# Patient Record
Sex: MALE | Race: WHITE | NOT HISPANIC OR LATINO | Employment: FULL TIME | URBAN - METROPOLITAN AREA
[De-identification: names, ages, dates, MRNs, and addresses within clinical notes are randomized per-mention and may not be internally consistent; named-entity substitution may affect disease eponyms.]

---

## 2023-10-29 PROBLEM — L73.2 HIDRADENITIS SUPPURATIVA: Status: ACTIVE | Noted: 2023-10-29

## 2023-10-29 PROBLEM — E11.9 DIABETES MELLITUS (MULTI): Status: ACTIVE | Noted: 2023-10-29

## 2023-10-29 PROBLEM — N40.1 BENIGN PROSTATIC HYPERPLASIA WITH LOWER URINARY TRACT SYMPTOMS: Status: ACTIVE | Noted: 2023-10-29

## 2023-10-29 PROBLEM — M70.72 BURSITIS OF LEFT HIP: Status: ACTIVE | Noted: 2023-10-29

## 2023-10-29 PROBLEM — I25.10 CAD (CORONARY ARTERY DISEASE), NATIVE CORONARY ARTERY: Status: ACTIVE | Noted: 2023-10-29

## 2023-10-29 PROBLEM — Z96.642 STATUS POST TOTAL REPLACEMENT OF LEFT HIP: Status: ACTIVE | Noted: 2023-10-29

## 2023-10-29 PROBLEM — R91.8 LUNG NODULES: Status: ACTIVE | Noted: 2023-10-29

## 2023-10-29 PROBLEM — N42.82 PROSTATITIS SYNDROME: Status: ACTIVE | Noted: 2023-10-29

## 2023-10-29 PROBLEM — G60.9 IDIOPATHIC PERIPHERAL NEUROPATHY: Status: ACTIVE | Noted: 2023-10-29

## 2023-10-29 PROBLEM — D72.829 LEUKOCYTOSIS: Status: ACTIVE | Noted: 2023-10-29

## 2023-10-29 PROBLEM — M16.12 OSTEOARTHRITIS OF LEFT HIP: Status: ACTIVE | Noted: 2023-10-29

## 2023-10-29 PROBLEM — E78.00 ELEVATED LDL CHOLESTEROL LEVEL: Status: ACTIVE | Noted: 2023-10-29

## 2023-10-29 PROBLEM — L02.92 FURUNCULOSIS: Status: ACTIVE | Noted: 2023-10-29

## 2023-10-29 PROBLEM — J45.991 COUGH VARIANT ASTHMA (HHS-HCC): Status: ACTIVE | Noted: 2023-10-29

## 2023-10-29 PROBLEM — I10 HYPERTENSION: Status: ACTIVE | Noted: 2023-10-29

## 2023-10-29 PROBLEM — J44.9 CHRONIC OBSTRUCTIVE PULMONARY DISEASE (MULTI): Status: ACTIVE | Noted: 2023-10-29

## 2023-10-29 PROBLEM — K80.70 CHOLELITHIASIS WITH CHOLEDOCHOLITHIASIS: Status: ACTIVE | Noted: 2023-10-29

## 2023-10-29 PROBLEM — L72.3 SEBACEOUS CYST: Status: ACTIVE | Noted: 2023-10-29

## 2023-10-29 PROBLEM — L73.9 FOLLICULITIS: Status: ACTIVE | Noted: 2023-10-29

## 2023-10-29 PROBLEM — N34.3 DYSURIA-FREQUENCY SYNDROME: Status: ACTIVE | Noted: 2023-10-29
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
## 2023-10-29 RX ORDER — DOXYCYCLINE 100 MG/1
1 TABLET ORAL EVERY 12 HOURS
COMMUNITY
Start: 2016-01-19 | End: 2024-01-02 | Stop reason: WASHOUT

## 2023-10-29 RX ORDER — FLUTICASONE PROPIONATE 50 MCG
1 SPRAY, SUSPENSION (ML) NASAL 2 TIMES DAILY
COMMUNITY
Start: 2020-04-07 | End: 2023-12-21 | Stop reason: ALTCHOICE

## 2023-10-29 RX ORDER — ROSUVASTATIN CALCIUM 5 MG/1
1 TABLET, COATED ORAL DAILY
COMMUNITY
Start: 2016-01-19

## 2023-10-29 RX ORDER — GABAPENTIN 100 MG/1
1 CAPSULE ORAL NIGHTLY PRN
COMMUNITY
Start: 2018-07-17

## 2023-10-29 RX ORDER — TRAMADOL HYDROCHLORIDE 50 MG/1
1 TABLET ORAL 3 TIMES DAILY
COMMUNITY
Start: 2015-10-20 | End: 2023-12-21 | Stop reason: ALTCHOICE

## 2023-10-29 RX ORDER — EPINEPHRINE 0.3 MG/.3ML
0.3 INJECTION SUBCUTANEOUS
COMMUNITY
Start: 2016-07-05 | End: 2024-03-14 | Stop reason: HOSPADM

## 2023-10-29 RX ORDER — MINERAL OIL
180 ENEMA (ML) RECTAL DAILY
COMMUNITY
End: 2023-12-21 | Stop reason: ALTCHOICE

## 2023-10-29 RX ORDER — MOMETASONE FUROATE 1 MG/G
OINTMENT TOPICAL
COMMUNITY
Start: 2022-03-10 | End: 2023-12-21 | Stop reason: ALTCHOICE

## 2023-10-29 RX ORDER — AMOXICILLIN 500 MG/1
TABLET, FILM COATED ORAL
COMMUNITY
Start: 2022-03-10 | End: 2023-12-21 | Stop reason: ALTCHOICE

## 2023-12-05 ENCOUNTER — LAB (OUTPATIENT)
Dept: LAB | Facility: LAB | Age: 63
End: 2023-12-05
Payer: COMMERCIAL

## 2023-12-05 DIAGNOSIS — J44.9 CHRONIC OBSTRUCTIVE PULMONARY DISEASE, UNSPECIFIED (MULTI): Primary | ICD-10-CM

## 2023-12-05 DIAGNOSIS — I10 ESSENTIAL (PRIMARY) HYPERTENSION: ICD-10-CM

## 2023-12-05 DIAGNOSIS — N40.1 BENIGN PROSTATIC HYPERPLASIA WITH LOWER URINARY TRACT SYMPTOMS: ICD-10-CM

## 2023-12-05 DIAGNOSIS — E78.00 PURE HYPERCHOLESTEROLEMIA, UNSPECIFIED: ICD-10-CM

## 2023-12-05 DIAGNOSIS — E11.9 TYPE 2 DIABETES MELLITUS WITHOUT COMPLICATIONS (MULTI): ICD-10-CM

## 2023-12-05 LAB
ALBUMIN SERPL BCP-MCNC: 4.5 G/DL (ref 3.4–5)
ALP SERPL-CCNC: 52 U/L (ref 33–136)
ALT SERPL W P-5'-P-CCNC: 12 U/L (ref 10–52)
ANION GAP SERPL CALC-SCNC: 13 MMOL/L (ref 10–20)
AST SERPL W P-5'-P-CCNC: 16 U/L (ref 9–39)
BASOPHILS # BLD AUTO: 0.1 X10*3/UL (ref 0–0.1)
BASOPHILS NFR BLD AUTO: 1 %
BILIRUB SERPL-MCNC: 0.7 MG/DL (ref 0–1.2)
BUN SERPL-MCNC: 13 MG/DL (ref 6–23)
CALCIUM SERPL-MCNC: 9.6 MG/DL (ref 8.6–10.3)
CHLORIDE SERPL-SCNC: 100 MMOL/L (ref 98–107)
CHOLEST SERPL-MCNC: 194 MG/DL (ref 0–199)
CHOLESTEROL/HDL RATIO: 5
CO2 SERPL-SCNC: 28 MMOL/L (ref 21–32)
CREAT SERPL-MCNC: 0.85 MG/DL (ref 0.5–1.3)
EOSINOPHIL # BLD AUTO: 0.45 X10*3/UL (ref 0–0.7)
EOSINOPHIL NFR BLD AUTO: 4.6 %
ERYTHROCYTE [DISTWIDTH] IN BLOOD BY AUTOMATED COUNT: 13.7 % (ref 11.5–14.5)
GFR SERPL CREATININE-BSD FRML MDRD: >90 ML/MIN/1.73M*2
GLUCOSE SERPL-MCNC: 75 MG/DL (ref 74–99)
HCT VFR BLD AUTO: 47.1 % (ref 41–52)
HDLC SERPL-MCNC: 39.1 MG/DL
HGB BLD-MCNC: 15.2 G/DL (ref 13.5–17.5)
IMM GRANULOCYTES # BLD AUTO: 0.05 X10*3/UL (ref 0–0.7)
IMM GRANULOCYTES NFR BLD AUTO: 0.5 % (ref 0–0.9)
LDLC SERPL CALC-MCNC: 126 MG/DL
LYMPHOCYTES # BLD AUTO: 3.29 X10*3/UL (ref 1.2–4.8)
LYMPHOCYTES NFR BLD AUTO: 33.4 %
MCH RBC QN AUTO: 30.5 PG (ref 26–34)
MCHC RBC AUTO-ENTMCNC: 32.3 G/DL (ref 32–36)
MCV RBC AUTO: 95 FL (ref 80–100)
MONOCYTES # BLD AUTO: 0.72 X10*3/UL (ref 0.1–1)
MONOCYTES NFR BLD AUTO: 7.3 %
NEUTROPHILS # BLD AUTO: 5.24 X10*3/UL (ref 1.2–7.7)
NEUTROPHILS NFR BLD AUTO: 53.2 %
NON HDL CHOLESTEROL: 155 MG/DL (ref 0–149)
NRBC BLD-RTO: 0 /100 WBCS (ref 0–0)
PLATELET # BLD AUTO: 381 X10*3/UL (ref 150–450)
POTASSIUM SERPL-SCNC: 4.5 MMOL/L (ref 3.5–5.3)
PROT SERPL-MCNC: 7 G/DL (ref 6.4–8.2)
RBC # BLD AUTO: 4.98 X10*6/UL (ref 4.5–5.9)
SODIUM SERPL-SCNC: 136 MMOL/L (ref 136–145)
T4 FREE SERPL-MCNC: 0.92 NG/DL (ref 0.61–1.12)
TRIGL SERPL-MCNC: 145 MG/DL (ref 0–149)
TSH SERPL-ACNC: 1.31 MIU/L (ref 0.44–3.98)
VLDL: 29 MG/DL (ref 0–40)
WBC # BLD AUTO: 9.9 X10*3/UL (ref 4.4–11.3)

## 2023-12-05 PROCEDURE — 85025 COMPLETE CBC W/AUTO DIFF WBC: CPT

## 2023-12-05 PROCEDURE — 84439 ASSAY OF FREE THYROXINE: CPT

## 2023-12-05 PROCEDURE — 83036 HEMOGLOBIN GLYCOSYLATED A1C: CPT

## 2023-12-05 PROCEDURE — 80053 COMPREHEN METABOLIC PANEL: CPT

## 2023-12-05 PROCEDURE — 80061 LIPID PANEL: CPT

## 2023-12-05 PROCEDURE — 84443 ASSAY THYROID STIM HORMONE: CPT

## 2023-12-05 PROCEDURE — 36415 COLL VENOUS BLD VENIPUNCTURE: CPT

## 2023-12-06 LAB
EST. AVERAGE GLUCOSE BLD GHB EST-MCNC: 108 MG/DL
HBA1C MFR BLD: 5.4 %

## 2023-12-07 ENCOUNTER — OFFICE VISIT (OUTPATIENT)
Dept: PRIMARY CARE | Facility: CLINIC | Age: 63
End: 2023-12-07
Payer: COMMERCIAL

## 2023-12-07 VITALS
SYSTOLIC BLOOD PRESSURE: 120 MMHG | DIASTOLIC BLOOD PRESSURE: 70 MMHG | HEART RATE: 72 BPM | RESPIRATION RATE: 20 BRPM | WEIGHT: 225 LBS | BODY MASS INDEX: 28.88 KG/M2 | HEIGHT: 74 IN

## 2023-12-07 DIAGNOSIS — I25.10 CORONARY ARTERY DISEASE INVOLVING NATIVE CORONARY ARTERY OF NATIVE HEART, UNSPECIFIED WHETHER ANGINA PRESENT: ICD-10-CM

## 2023-12-07 DIAGNOSIS — J40 BRONCHITIS: ICD-10-CM

## 2023-12-07 DIAGNOSIS — E78.00 ELEVATED LDL CHOLESTEROL LEVEL: ICD-10-CM

## 2023-12-07 DIAGNOSIS — J98.01 COUGH DUE TO BRONCHOSPASM: Primary | ICD-10-CM

## 2023-12-07 DIAGNOSIS — N40.1 BENIGN PROSTATIC HYPERPLASIA WITH URINARY FREQUENCY: ICD-10-CM

## 2023-12-07 DIAGNOSIS — J43.1 PANLOBULAR EMPHYSEMA (MULTI): ICD-10-CM

## 2023-12-07 DIAGNOSIS — E11.59 TYPE 2 DIABETES MELLITUS WITH OTHER CIRCULATORY COMPLICATION, WITHOUT LONG-TERM CURRENT USE OF INSULIN (MULTI): ICD-10-CM

## 2023-12-07 DIAGNOSIS — R35.0 BENIGN PROSTATIC HYPERPLASIA WITH URINARY FREQUENCY: ICD-10-CM

## 2023-12-07 PROCEDURE — 3078F DIAST BP <80 MM HG: CPT | Performed by: INTERNAL MEDICINE

## 2023-12-07 PROCEDURE — 3044F HG A1C LEVEL LT 7.0%: CPT | Performed by: INTERNAL MEDICINE

## 2023-12-07 PROCEDURE — 99214 OFFICE O/P EST MOD 30 MIN: CPT | Performed by: INTERNAL MEDICINE

## 2023-12-07 PROCEDURE — 1036F TOBACCO NON-USER: CPT | Performed by: INTERNAL MEDICINE

## 2023-12-07 PROCEDURE — 3074F SYST BP LT 130 MM HG: CPT | Performed by: INTERNAL MEDICINE

## 2023-12-07 PROCEDURE — 3049F LDL-C 100-129 MG/DL: CPT | Performed by: INTERNAL MEDICINE

## 2023-12-07 RX ORDER — AZITHROMYCIN 500 MG/1
500 TABLET, FILM COATED ORAL DAILY
Qty: 6 TABLET | Refills: 0 | Status: SHIPPED | OUTPATIENT
Start: 2023-12-07 | End: 2023-12-13

## 2023-12-07 RX ORDER — PREDNISONE 10 MG/1
TABLET ORAL
Qty: 30 TABLET | Refills: 0 | Status: SHIPPED | OUTPATIENT
Start: 2023-12-07 | End: 2023-12-17

## 2023-12-07 ASSESSMENT — ENCOUNTER SYMPTOMS
NEUROLOGICAL NEGATIVE: 1
EYES NEGATIVE: 1
ALLERGIC/IMMUNOLOGIC NEGATIVE: 1
PSYCHIATRIC NEGATIVE: 1
GASTROINTESTINAL NEGATIVE: 1
ENDOCRINE NEGATIVE: 1
CARDIOVASCULAR NEGATIVE: 1
HEMATOLOGIC/LYMPHATIC NEGATIVE: 1
CONSTITUTIONAL NEGATIVE: 1
MUSCULOSKELETAL NEGATIVE: 1
RESPIRATORY NEGATIVE: 1

## 2023-12-07 NOTE — ASSESSMENT & PLAN NOTE
Bronchitis with wheezing                                           Recommend:                                                                                                      mild  Pharyngitis,                                                                                                                                                                               Start: -  Azithromycin 500 mg daily for 6 days  -                                                                                                                                                             Asthma/COPD exacerbation       Allegra 180mg qD vs. Claritin 10 mg qD and Mucinex                                                                                                                                             Cough  - start Prednisone 10 mg II BID for 3 days and II qD for 3 days and I qD for 3 days -  Avoid cold exposure and take vitamins C 500 and B complex  qD   Hycodan syrup one tea spoon qHS 4 oz.     Sinusitis - allergic vs. infectious - start Allegra and Flonase I BID and Azithromycin 500 mg qD for 6 days. and avoid cold exposure.

## 2023-12-07 NOTE — ASSESSMENT & PLAN NOTE
Hypercholesterolemia - Monitor lipid profile and educate patient upon risks of high cholesterol and targets. Educate diet and change in lifestyle and increase in exercises - Refill:   Rosuvastatin and educate compliance with medication and diet.

## 2023-12-07 NOTE — ASSESSMENT & PLAN NOTE
COPD - Educate tobacco cessation extensively , no wheezing, no SOB, no Crackles heard/ Exacerbation.         Get CXR.  Continues mild exercises and inhalers.  [place] preventive care and vaccinations.                                       Add advair inhalers and spiriva inhaler.

## 2023-12-07 NOTE — PROGRESS NOTES
"Subjective   Patient ID: Mike Franco is a 63 y.o. male who presents for Follow-up (Follow up). Cough wheezing and sinus head aches and chest congestion and mild shortness of breath for 3 weeks  - Covid  tests were negative twice     HPI     Review of Systems   Constitutional: Negative.    HENT: Negative.     Eyes: Negative.    Respiratory: Negative.     Cardiovascular: Negative.    Gastrointestinal: Negative.    Endocrine: Negative.    Musculoskeletal: Negative.    Skin: Negative.    Allergic/Immunologic: Negative.    Neurological: Negative.    Hematological: Negative.    Psychiatric/Behavioral: Negative.     All other systems reviewed and are negative.      Objective   Ht 1.88 m (6' 2\")   Wt 102 kg (225 lb)   BMI 28.89 kg/m²   Blood pressure 120/70, pulse 72, resp. rate 20, height 1.88 m (6' 2\"), weight 102 kg (225 lb).   Physical Exam  Vitals and nursing note reviewed.   Constitutional:       Appearance: Normal appearance.   HENT:      Head: Normocephalic and atraumatic.      Right Ear: Tympanic membrane, ear canal and external ear normal.      Left Ear: Tympanic membrane, ear canal and external ear normal. There is no impacted cerumen.      Nose: Nose normal.      Mouth/Throat:      Mouth: Mucous membranes are moist.      Pharynx: Oropharynx is clear.   Eyes:      Extraocular Movements: Extraocular movements intact.      Conjunctiva/sclera: Conjunctivae normal.      Pupils: Pupils are equal, round, and reactive to light.   Cardiovascular:      Rate and Rhythm: Normal rate and regular rhythm.      Pulses: Normal pulses.      Heart sounds: Normal heart sounds. No murmur heard.  Pulmonary:      Effort: Pulmonary effort is normal. No respiratory distress.      Breath sounds: Normal breath sounds. No stridor. No wheezing, rhonchi or rales.   Chest:      Chest wall: No tenderness.   Abdominal:      General: Abdomen is flat. Bowel sounds are normal. There is no distension.      Palpations: Abdomen is soft. There is " no mass.      Tenderness: There is no abdominal tenderness. There is no right CVA tenderness, left CVA tenderness, guarding or rebound.      Hernia: No hernia is present.   Musculoskeletal:         General: Normal range of motion.      Cervical back: Normal range of motion and neck supple.   Skin:     General: Skin is warm.      Capillary Refill: Capillary refill takes less than 2 seconds.   Neurological:      General: No focal deficit present.      Mental Status: He is alert.      Cranial Nerves: No cranial nerve deficit.      Sensory: No sensory deficit.      Motor: No weakness.      Coordination: Coordination normal.      Gait: Gait normal.      Deep Tendon Reflexes: Reflexes normal.   Psychiatric:         Mood and Affect: Mood normal.         Behavior: Behavior normal. Behavior is cooperative.         Thought Content: Thought content normal.         Judgment: Judgment normal.         Assessment/Plan   Problem List Items Addressed This Visit             ICD-10-CM    Benign prostatic hyperplasia with lower urinary tract symptoms N40.1     BPH - Benign PROSTATIC Hypertrophy, + Dysuria/Nocturia, check PSA, prescribe Flomax 0.4mg qD and Avodart 0.5 mg qD vs. Finasteride 5 mg qD.  Educate exercises and Change in life style, prescribe vitamin E 400 IU qD. Consider referral to urology.          CAD (coronary artery disease), native coronary artery I25.10     CAD-coronary artery disease-patient has a history of myocardial infarction/stent placed in stenosed coronary arteries. Target LDL should be below 70 milligrams per deciliter. Reviewed EKG which shows no significant changes. Follows with his cardiologist/ Dr. Aviles. He needs to call us with any new symptoms of angina or shortness of breath. Last stress test was/last coronary catheterization was/. Need to address risk factors [employer] controlling cholesterol blood pressure and diabetes. Educate extensively diet. Patient needs to follow in rehabilitation/mild exercises  daily.          Chronic obstructive pulmonary disease (CMS/HCC) J44.9     COPD - Educate tobacco cessation extensively , no wheezing, no SOB, no Crackles heard/ Exacerbation.         Get CXR.  Continues mild exercises and inhalers.  [place] preventive care and vaccinations.                                       Add advair inhalers and spiriva inhaler.           Diabetes mellitus (CMS/HCC) E11.9     DM - NIDDM  . Reviewed with patient / Will check  HbA1c and fasting blood sugars. Educate home self monitoring and diary keeping(reviewed with patient home blood sugar levels /diary). Educate extensively low calorie diet and weight loss with exercise. Reviewed BS- diary and Rx. Regimen. [place] renal protection with ARB/ACEI.               Educate compliance with diet and Rx. And educate risks and autcomes.                                                 Elevated LDL cholesterol level E78.00     Hypercholesterolemia - Monitor lipid profile and educate patient upon risks of high cholesterol and targets. Educate diet and change in lifestyle and increase in exercises - Refill:   Rosuvastatin and educate compliance with medication and diet.           Cough due to bronchospasm - Primary J98.01     Bronchitis with wheezing                                           Recommend:                                                                                                      mild  Pharyngitis,                                                                                                                                                                               Start: -  Azithromycin 500 mg daily for 6 days  -                                                                                                                                                             Asthma/COPD exacerbation       Allegra 180mg qD vs. Claritin 10 mg qD and Mucinex                                                                                                                                              Cough  - start Prednisone 10 mg II BID for 3 days and II qD for 3 days and I qD for 3 days -  Avoid cold exposure and take vitamins C 500 and B complex  qD   Hycodan syrup one tea spoon qHS 4 oz.     Sinusitis - allergic vs. infectious - start Allegra and Flonase I BID and Azithromycin 500 mg qD for 6 days. and avoid cold exposure.           Relevant Medications    azithromycin (Zithromax) 500 mg tablet    predniSONE (Deltasone) 10 mg tablet    Bronchitis J40     Bronchitis with wheezing                                           Recommend:                                                                                                      mild  Pharyngitis,                                                                                                                                                                               Start: -  Azithromycin 500 mg daily for 6 days  -                                                                                                                                                             Asthma/COPD exacerbation       Allegra 180mg qD vs. Claritin 10 mg qD and Mucinex                                                                                                                                             Cough  - start Prednisone 10 mg II BID for 3 days and II qD for 3 days and I qD for 3 days -  Avoid cold exposure and take vitamins C 500 and B complex  qD   Hycodan syrup one tea spoon qHS 4 oz.     Sinusitis - allergic vs. infectious - start Allegra and Flonase I BID and Azithromycin 500 mg qD for 6 days. and avoid cold exposure.              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 Reviewed MRI of the left hip. Refer for THR..      Leg cramps - double up the Gabapentin - to 100 mg TID -      COPD - Exacerbation and wheezing Educate extensively tobacco cessation and educate risks of COPD/Emphysema and CAD and cancer and stroke associated with tobacco use and now there is no wheezing, no SOB, mild Crackles heard/ and decrease air exchange and breath sounds but no apparent Exacerbation. Reviewed with the patient the CT of the chest. Continues mild exercises and inhalers. [place] preventive care and vaccinations.      CAD _ asymptomatic - Reviewed with the patient the calcium score scan and EKG now - - with mild accumulation of plaque on LAD _ at 226 - Educate extensively tobacco cessation and risk of MI and check lipid profile and refill: the Rosuvastatin - address risk factors -      CHOL. - Hypercholesterolemia. Reviewed lipid profile (LDL at 208 mg/dL) and LFT's. Target LDL at < 100 mg/dl. Educate low cholesterol diet (low calorie and low fat). Educate weight loss and exercise. Educate weight self monitoring. Educate risks and outcomes. Not taking Crestor/ 10 mg daily. Restart Crestor 10 mg daily.      HTN - hypertension well/poorly controlled.Target BP < 130/80 well/not achieved. Educate low salt diet and exercise with weight loss. Represcribe ARB/ACEI , metoprolol, hctz/Maxzide,   Educate home self monitoring and diary keeping.      DM - NIDDM. Will check HbA1c and fasting blood sugars. Educate home self monitoring and diary keeping. Educate extensively low calorie diet and weight loss with exercise. Reviewed BS- diary and Rx. Regimen. [place] renal protection with ARB/ACEI. Educate compliance with diet and Rx. And educate risks and outcomes.   Neuropathy - positive numbness, tingling, discomfort (needles  pricking, cold feeling) in distal lower extremities(toes, feet, legs), secondary to DM/Radiculopathy/idiopathic. Recommend: Gabapentin(Neurontin) 300 daily(at bed time) upward taper up to 2 gm/day or Lyrica 75 mg daily if failure of treatment. Also recommend: treatment of Diabetes(control levels of blood sugars), lumbar/ cervical disc disease (Physical Therapy), and check electrolytes and Thyroid function. Also address regenerative measures: B12 IntraMuscular (Monthly) and Folic acid supplementation. Recommend EMG.      Preventive measures â€“ Recommend ASAP : Colonoscopy (educate patient risks of colon cancer) refer patient to GI specialist. Ophthalmology and retina exam recommend yearly exams refer patient to an Ophthalmologist. BPH â€“ (Benign Prostatic Hypertrophy) refer patient to an Urologist for rectal exam and PSA check. Educate extensively tobacco cessation and educate risks of COPD/Emphysema and CAD and cancer and stroke associated with tobacco use

## 2023-12-14 ENCOUNTER — APPOINTMENT (OUTPATIENT)
Dept: RADIOLOGY | Facility: HOSPITAL | Age: 63
End: 2023-12-14
Payer: COMMERCIAL

## 2023-12-14 ENCOUNTER — HOSPITAL ENCOUNTER (OUTPATIENT)
Dept: RADIOLOGY | Facility: HOSPITAL | Age: 63
Discharge: HOME | End: 2023-12-14
Payer: COMMERCIAL

## 2023-12-14 DIAGNOSIS — F17.201 NICOTINE DEPENDENCE, UNSPECIFIED, IN REMISSION: ICD-10-CM

## 2023-12-14 PROCEDURE — 71271 CT THORAX LUNG CANCER SCR C-: CPT

## 2023-12-14 PROCEDURE — 71271 CT THORAX LUNG CANCER SCR C-: CPT | Performed by: RADIOLOGY

## 2023-12-21 ENCOUNTER — OFFICE VISIT (OUTPATIENT)
Dept: PRIMARY CARE | Facility: CLINIC | Age: 63
End: 2023-12-21
Payer: COMMERCIAL

## 2023-12-21 VITALS
RESPIRATION RATE: 16 BRPM | HEART RATE: 88 BPM | BODY MASS INDEX: 29.13 KG/M2 | WEIGHT: 227 LBS | OXYGEN SATURATION: 97 % | HEIGHT: 74 IN | DIASTOLIC BLOOD PRESSURE: 70 MMHG | SYSTOLIC BLOOD PRESSURE: 110 MMHG

## 2023-12-21 DIAGNOSIS — I10 PRIMARY HYPERTENSION: ICD-10-CM

## 2023-12-21 DIAGNOSIS — F41.8 ANXIETY ASSOCIATED WITH DEPRESSION: ICD-10-CM

## 2023-12-21 DIAGNOSIS — R91.8 MASS OF LEFT LUNG: Primary | ICD-10-CM

## 2023-12-21 DIAGNOSIS — E11.59 TYPE 2 DIABETES MELLITUS WITH OTHER CIRCULATORY COMPLICATION, WITHOUT LONG-TERM CURRENT USE OF INSULIN (MULTI): ICD-10-CM

## 2023-12-21 PROCEDURE — 3049F LDL-C 100-129 MG/DL: CPT | Performed by: INTERNAL MEDICINE

## 2023-12-21 PROCEDURE — 3074F SYST BP LT 130 MM HG: CPT | Performed by: INTERNAL MEDICINE

## 2023-12-21 PROCEDURE — 3044F HG A1C LEVEL LT 7.0%: CPT | Performed by: INTERNAL MEDICINE

## 2023-12-21 PROCEDURE — 99213 OFFICE O/P EST LOW 20 MIN: CPT | Performed by: INTERNAL MEDICINE

## 2023-12-21 PROCEDURE — 1036F TOBACCO NON-USER: CPT | Performed by: INTERNAL MEDICINE

## 2023-12-21 PROCEDURE — 3078F DIAST BP <80 MM HG: CPT | Performed by: INTERNAL MEDICINE

## 2023-12-21 RX ORDER — ALPRAZOLAM 0.25 MG/1
0.25 TABLET ORAL 3 TIMES DAILY PRN
Qty: 30 TABLET | Refills: 0 | Status: SHIPPED | OUTPATIENT
Start: 2023-12-21 | End: 2024-01-02 | Stop reason: WASHOUT

## 2023-12-21 ASSESSMENT — ENCOUNTER SYMPTOMS
MUSCULOSKELETAL NEGATIVE: 1
CONSTITUTIONAL NEGATIVE: 1
NEUROLOGICAL NEGATIVE: 1
CARDIOVASCULAR NEGATIVE: 1
RESPIRATORY NEGATIVE: 1
EYES NEGATIVE: 1
GASTROINTESTINAL NEGATIVE: 1
HEMATOLOGIC/LYMPHATIC NEGATIVE: 1
ENDOCRINE NEGATIVE: 1
ALLERGIC/IMMUNOLOGIC NEGATIVE: 1
PSYCHIATRIC NEGATIVE: 1

## 2023-12-21 NOTE — ASSESSMENT & PLAN NOTE
Left upper lobe mass on the CT of the chest causing cough and chest discomfort refer to PET scan and oncology for treatment and biopsy and .educate Tobacco cessation - Educate risks of smoking (CAD/COPD/CANCER of the lung or urinary bladder/CVA). Educate life style changes and prescribe nicotine patch and Wellbutrin 150mg BID. Educate stress reduction.

## 2023-12-21 NOTE — PROGRESS NOTES
"Subjective   Patient ID: Mike Franco is a 63 y.o. male who presents for Follow-up (Follow up for CT Results).    HPI     Review of Systems   Constitutional: Negative.    HENT: Negative.     Eyes: Negative.    Respiratory: Negative.     Cardiovascular: Negative.    Gastrointestinal: Negative.    Endocrine: Negative.    Musculoskeletal: Negative.    Skin: Negative.    Allergic/Immunologic: Negative.    Neurological: Negative.    Hematological: Negative.    Psychiatric/Behavioral: Negative.     All other systems reviewed and are negative.      Objective   Pulse 88   Ht 1.88 m (6' 2\")   Wt 103 kg (227 lb)   SpO2 97%   BMI 29.15 kg/m²   Blood pressure 110/70, pulse 88, resp. rate 16, height 1.88 m (6' 2\"), weight 103 kg (227 lb), SpO2 97 %.   Physical Exam  Vitals and nursing note reviewed.   Constitutional:       Appearance: Normal appearance.   HENT:      Head: Normocephalic and atraumatic.      Right Ear: Tympanic membrane, ear canal and external ear normal.      Left Ear: Tympanic membrane, ear canal and external ear normal. There is no impacted cerumen.      Nose: Nose normal.      Mouth/Throat:      Mouth: Mucous membranes are moist.      Pharynx: Oropharynx is clear.   Eyes:      Extraocular Movements: Extraocular movements intact.      Conjunctiva/sclera: Conjunctivae normal.      Pupils: Pupils are equal, round, and reactive to light.   Cardiovascular:      Rate and Rhythm: Normal rate and regular rhythm.      Pulses: Normal pulses.      Heart sounds: Normal heart sounds. No murmur heard.  Pulmonary:      Effort: Pulmonary effort is normal. No respiratory distress.      Breath sounds: Normal breath sounds. No stridor. No wheezing, rhonchi or rales.   Chest:      Chest wall: No tenderness.   Abdominal:      General: Abdomen is flat. Bowel sounds are normal. There is no distension.      Palpations: Abdomen is soft. There is no mass.      Tenderness: There is no abdominal tenderness. There is no right CVA " tenderness, left CVA tenderness, guarding or rebound.      Hernia: No hernia is present.   Musculoskeletal:         General: Normal range of motion.      Cervical back: Normal range of motion and neck supple.   Skin:     General: Skin is warm.      Capillary Refill: Capillary refill takes less than 2 seconds.   Neurological:      General: No focal deficit present.      Mental Status: He is alert.      Cranial Nerves: No cranial nerve deficit.      Sensory: No sensory deficit.      Motor: No weakness.      Coordination: Coordination normal.      Gait: Gait normal.      Deep Tendon Reflexes: Reflexes normal.   Psychiatric:         Mood and Affect: Mood normal.         Behavior: Behavior normal. Behavior is cooperative.         Thought Content: Thought content normal.         Judgment: Judgment normal.         Assessment/Plan   Problem List Items Addressed This Visit             ICD-10-CM    Diabetes mellitus (CMS/[facility]) E11.9     DM - NIDDM  . Reviewed with patient / Will check  HbA1c and fasting blood sugars. Educate home self monitoring and diary keeping(reviewed with patient home blood sugar levels /diary). Educate extensively low calorie diet and weight loss with exercise. Reviewed BS- diary and Rx. Regimen. [place] renal protection with ARB/ACEI.               Educate compliance with diet and Rx. And educate risks and autcomes.                                                 Hypertension I10     HTN - hypertension well/controlled .Target BP < 130/80  achieved. Educate low salt diet and exercise with weight loss. Educate home self monitoring and diary keeping. Educate risks of elevate blood pressure and benefits of prompt treatment.  Refill          Mass of left lung - Primary R91.8     Left upper lobe mass on the CT of the chest causing cough and chest discomfort refer to PET scan and oncology for treatment and biopsy and .educate Tobacco cessation - Educate risks of smoking (CAD/COPD/CANCER of the lung or  urinary bladder/CVA). Educate life style changes and prescribe nicotine patch and Wellbutrin 150mg BID. Educate stress reduction.                                                                                      Relevant Orders    Referral to Hematology and Oncology    NM PET CT lung SPN       Benign prostatic hyperplasia with lower urinary tract symptoms N40.1        BPH - Benign PROSTATIC Hypertrophy, + Dysuria/Nocturia, check PSA, prescribe Flomax 0.4mg qD and Avodart 0.5 mg qD vs. Finasteride 5 mg qD.  Educate exercises and Change in life style, prescribe vitamin E 400 IU qD. Consider referral to urology.            CAD (coronary artery disease), native coronary artery I25.10       CAD-coronary artery disease-patient has a history of myocardial infarction/stent placed in stenosed coronary arteries. Target LDL should be below 70 milligrams per deciliter. Reviewed EKG which shows no significant changes. Follows with his cardiologist/ Dr. Aviles. He needs to call us with any new symptoms of angina or shortness of breath. Last stress test was/last coronary catheterization was/. Need to address risk factors [employer] controlling cholesterol blood pressure and diabetes. Educate extensively diet. Patient needs to follow in rehabilitation/mild exercises daily.            Chronic obstructive pulmonary disease (CMS/[facility]) J44.9       COPD - Educate tobacco cessation extensively , no wheezing, no SOB, no Crackles heard/ Exacerbation.         Get CXR.  Continues mild exercises and inhalers.  [place] preventive care and vaccinations.                                       Add advair inhalers and spiriva inhaler.             Diabetes mellitus (CMS/[facility]) E11.9       DM - NIDDM  . Reviewed with patient / Will check  HbA1c and fasting blood sugars. Educate home self monitoring and diary keeping(reviewed with patient home blood sugar levels /diary). Educate extensively low calorie diet and weight loss with exercise. Reviewed BS-  diary and Rx. Regimen. [place] renal protection with ARB/ACEI.               Educate compliance with diet and Rx. And educate risks and autcomes.                                                   Elevated LDL cholesterol level E78.00       Hypercholesterolemia - Monitor lipid profile and educate patient upon risks of high cholesterol and targets. Educate diet and change in lifestyle and increase in exercises - Refill:   Rosuvastatin and educate compliance with medication and diet.             Cough due to bronchospasm - Primary J98.01       Bronchitis with wheezing                                           Recommend:                                                                                                      mild  Pharyngitis,                                                                                                                                                                               Start: -  Azithromycin 500 mg daily for 6 days  -                                                                                                                                                             Asthma/COPD exacerbation       Allegra 180mg qD vs. Claritin 10 mg qD and Mucinex                                                                                                                                              Cough  - start Prednisone 10 mg II BID for 3 days and II qD for 3 days and I qD for 3 days -  Avoid cold exposure and take vitamins C 500 and B complex  qD   Hycodan syrup one tea spoon qHS 4 oz.      Sinusitis - allergic vs. infectious - start Allegra and Flonase I BID and Azithromycin 500 mg qD for 6 days. and avoid cold exposure.             Relevant Medications     azithromycin (Zithromax) 500 mg tablet     predniSONE (Deltasone) 10 mg tablet     Bronchitis J40       Bronchitis with wheezing                                           Recommend:                                                                                                       mild  Pharyngitis,                                                                                                                                                                               Start: -  Azithromycin 500 mg daily for 6 days  -                                                                                                                                                             Asthma/COPD exacerbation       Allegra 180mg qD vs. Claritin 10 mg qD and Mucinex                                                                                                                                              Cough  - start Prednisone 10 mg II BID for 3 days and II qD for 3 days and I qD for 3 days -  Avoid cold exposure and take vitamins C 500 and B complex  qD   Hycodan syrup one tea spoon qHS 4 oz.      Sinusitis - allergic vs. infectious - start Allegra and Flonase I BID and Azithromycin 500 mg qD for 6 days. and avoid cold exposure.                 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 Reviewed MRI of the left hip. Refer for THR..      Leg cramps - double up the Gabapentin - to 100 mg TID -      COPD - Exacerbation and wheezing Educate extensively tobacco cessation and educate risks of COPD/Emphysema and CAD and cancer and stroke associated with tobacco use and now there is no wheezing, no SOB, mild Crackles heard/ and decrease air exchange and breath sounds but no apparent Exacerbation. Reviewed with the patient the CT of the chest.  Continues mild exercises and inhalers. [place] preventive care and vaccinations.      CAD _ asymptomatic - Reviewed with the patient the calcium score scan and EKG now - - with mild accumulation of plaque on LAD _ at 226 - Educate extensively tobacco cessation and risk of MI and check lipid profile and refill: the Rosuvastatin - address risk factors -      CHOL. - Hypercholesterolemia. Reviewed lipid profile (LDL at 208 mg/dL) and LFT's. Target LDL at < 100 mg/dl. Educate low cholesterol diet (low calorie and low fat). Educate weight loss and exercise. Educate weight self monitoring. Educate risks and outcomes. Not taking Crestor/ 10 mg daily. Restart Crestor 10 mg daily.      HTN - hypertension well/poorly controlled.Target BP < 130/80 well/not achieved. Educate low salt diet and exercise with weight loss. Represcribe ARB/ACEI , metoprolol, hctz/Maxzide,   Educate home self monitoring and diary keeping.      DM - NIDDM. Will check HbA1c and fasting blood sugars. Educate home self monitoring and diary keeping. Educate extensively low calorie diet and weight loss with exercise. Reviewed BS- diary and Rx. Regimen. [place] renal protection with ARB/ACEI. Educate compliance with diet and Rx. And educate risks and outcomes.   Neuropathy - positive numbness, tingling, discomfort (needles pricking, cold feeling) in distal lower extremities(toes, feet, legs), secondary to DM/Radiculopathy/idiopathic. Recommend: Gabapentin(Neurontin) 300 daily(at bed time) upward taper up to 2 gm/day or Lyrica 75 mg daily if failure of treatment. Also recommend: treatment of Diabetes(control levels of blood sugars), lumbar/ cervical disc disease (Physical Therapy), and check electrolytes and Thyroid function. Also address regenerative measures: B12 IntraMuscular (Monthly) and Folic acid supplementation. Recommend EMG.      Preventive measures â€“ Recommend ASAP : Colonoscopy (educate patient risks of colon cancer) refer patient to GI  specialist. Ophthalmology and retina exam recommend yearly exams refer patient to an Ophthalmologist. BPH â€“ (Benign Prostatic Hypertrophy) refer patient to an Urologist for rectal exam and PSA check. Educate extensively tobacco cessation and educate risks of COPD/Emphysema and CAD and cancer and stroke associated with tobacco use                Immunizations/Injections     Flu vaccine (IIV4), preservative free *Check age/dose*10/23/2020  Influenza, [id number]/18/2016, 10/8/2013  Influenza, seasonal, [id number]/14/2017, 10/16/2014  Influenza, seasonal, injectable, preservative free10/20/2015  Moderna SARS-CoV-2 Vaccination3/21/2022, 5/22/2021, 4/24/2021

## 2023-12-21 NOTE — ASSESSMENT & PLAN NOTE
DM - NIDDM  . Reviewed with patient / Will check  HbA1c and fasting blood sugars. Educate home self monitoring and diary keeping(reviewed with patient home blood sugar levels /diary). Educate extensively low calorie diet and weight loss with exercise. Reviewed BS- diary and Rx. Regimen. [place] renal protection with ARB/ACEI.               Educate compliance with diet and Rx. And educate risks and autcomes.

## 2023-12-21 NOTE — ASSESSMENT & PLAN NOTE
Anxiety Disorder - +/- Depresion.  the patient extensively. Prescribe  Xanax  0.25mg q8hr prn # 30 and 2 rfls.

## 2023-12-21 NOTE — ASSESSMENT & PLAN NOTE
HTN - hypertension well/controlled .Target BP < 130/80  achieved. Educate low salt diet and exercise with weight loss. Educate home self monitoring and diary keeping. Educate risks of elevate blood pressure and benefits of prompt treatment.  Refill

## 2023-12-22 DIAGNOSIS — R91.8 MASS OF LEFT LUNG: ICD-10-CM

## 2023-12-31 NOTE — PROGRESS NOTES
Patient: Mike Franco    [id number]  : 1960 -- AGE 63 y.o.    Provider: Sylvia PITT [facility]     Location [facility]   Service Date: 24          [facility]  New Visit Note    Virtual or Telephone Consent  A telephone visit (audio only) between the patient (at the originating site) and the provider (at the distant site) was utilized to provide this telehealth service.   Verbal consent was requested and obtained from Mike Franco on this date, 24 for a telehealth visit.     HISTORY OF PRESENT ILLNESS     The patient's referring provider is: No ref. provider found    HISTORY OF PRESENT ILLNESS   Mike Franco is a 63 y.o. male who is a former smoker (21 pack years), who presents to a [facility] for an initial evaluation for pre-bronchoscopy visit for a left lung mass    I have independently interviewed and examined the patient in the office and reviewed available records.    Current History    On today's visit, the patient reports he has a productive cough with clear mucus. He states his voice has been hoarse. He wheezes when he lays down. He has shortness of breath when he goes outside and has a little pain on his left chest area. He reports that his PCP prescribed antibiotic and steroids in December for a chest cold.      Previous pulmonary history: He has no history of recurrent infections, or lung disease as a child.  He had no previous lung hx, never on oxygen or inhaler therapy.     Inhalers/nebulized medications: no    Hospitalization History: He has not been hospitalized over the last year for breathing related problem.    Sleep history:  Denies snoring, apnea, feeling tired during the day or taking naps during the day.         ALLERGIES AND MEDICATIONS     ALLERGIES  No Known Allergies    MEDICATIONS  Current Outpatient Medications   Medication Sig Dispense Refill    ALPRAZolam (Xanax) 0.25 mg tablet Take  1 tablet (0.25 mg) by mouth 3 times a day as needed for anxiety. 30 tablet 0    dextromethorphan-guaifenesin (Mucinex DM)  mg 12 hr tablet Take 1 tablet by mouth every 12 hours. Do not crush, chew, or split.      doxycycline (Adoxa) 100 mg tablet Take 1 tablet (100 mg) by mouth every 12 hours.      EPINEPHrine 0.3 mg/0.3 mL injection syringe Inject 0.3 mL (0.3 mg) into the muscle. As Directed      gabapentin (Neurontin) 100 mg capsule Take 1 capsule (100 mg) by mouth once daily at bedtime.      rosuvastatin (Crestor) 5 mg tablet Take 1 tablet (5 mg) by mouth once daily.       No current facility-administered medications for this visit.         PAST HISTORY     PAST MEDICAL HISTORY      PAST SURGICAL HISTORY  Past Surgical History:   Procedure Laterality Date    HERNIA REPAIR      LEG AMPUTATION Right        IMMUNIZATION HISTORY  Immunization History   Administered Date(s) Administered    Flu vaccine (IIV4), preservative free *Check age/dose* 10/23/2020    Influenza, Unspecified 10/08/2013, 10/18/2016    Influenza, seasonal, injectable 10/16/2014, 2017    Influenza, seasonal, injectable, preservative free 10/20/2015    Moderna SARS-CoV-2 Vaccination 2021, 2021, 2022       SOCIAL HISTORY  Tobacco Smokin- 62 - 1/2 pack/day ~ 21 pack years  Illicit drugs: none  Alcohol consumption: occasionally  Pets: cat    OCCUPATIONAL/ENVIRONMENTAL HISTORY  Occupation:   Unknown  fiberglass. Unknown exposure to asbestos, silica, beryllium or inhaled metals.  No exposure to birds or exotic animals.    FAMILY HISTORY  Family History   Problem Relation Name Age of Onset    Cancer Mother       No family history of pulmonary disease.  Mother - cancer unknown.   Un family history of autoimmune disorders.    REVIEW OF SYSTEMS     REVIEW OF SYSTEMS  Review of Systems    Constitutional: No fever, no chills, no night sweats.    Eyes: No double vision, no floaters, no dry eyes.   ENT: See HPI.   Neck: No neck  stiffness.  Cardiovascular: No sharp chest pain, no heart racing, no leg swelling.  Respiratory: as noted in HPI.   Gastrointestinal: No nausea, no vomiting, no diarrhea.   Musculoskeletal: No joint pain, no back pain.   Integumentary: No rashes or sores.  Neurological: No dizziness, no headaches. Sleeping well.  Psychiatric: No mood changes.       PHYSICAL EXAM     VITAL SIGNS: There were no vitals taken for this visit.     CURRENT WEIGHT: There is no height or weight on file to calculate BMI.  PREVIOUS WEIGHTS:  Wt Readings from Last 3 Encounters:   12/21/23 103 kg (227 lb)   12/07/23 102 kg (225 lb)   06/06/23 102 kg (225 lb)       Physical Exam    Telephone visit    RESULTS/DATA     Pulmonary Function Test Results         None on record    Chest Radiograph     XR CHEST 1 VIEW 04/04/2020    Narrative  MRN: [id number]  Patient Name: GERARDO FONSECA    STUDY:  CHEST 1 VIEW    INDICATION:  Chest Pain.    COMPARISON:  February 12, 2020    ACCESSION NUMBER(S):  [id number]    ORDERING CLINICIAN:  BRANDY GOETZ    FINDINGS:  No consolidation, effusion, edema, or pneumothorax. Heart size within  normal limits.    Impression  No evidence of acute intrathoracic abnormality.      Chest CT Scan     CT lung screening low dose 12/14/2023    Narrative  Interpreted By:  Harley Ferreira,  STUDY:  CT LUNG SCREENING LOW DOSE;  12/14/2023 4:14 pm    INDICATION:  Signs/Symptoms: Nicotine dependence in remission  F17.201: Nicotine  dependence in remission.    COMPARISON:  11/30/2021    ACCESSION NUMBER(S):  [id number]    ORDERING CLINICIAN:  MATEO QUEZADA    TECHNIQUE:  Helical data acquisition of the chest was obtained without IV  contrast material.  Images were reformatted in axial, coronal, and  sagittal planes.    FINDINGS:  LUNGS AND AIRWAYS:  There is narrowing/occlusion of the left upper lobe bronchus.  Endobronchial invasion is suspected..    There is severe centrilobular and paraseptal emphysematous  changes.There has been  interval development of multifocal left upper  lobe masslike opacities. This infiltrative upper lobe soft tissue  measures 8.2 x 1.1 cm in dimension.        MEDIASTINUM AND VAL, LOWER NECK AND AXILLA:  The visualized thyroid gland is within normal limits.  There is extensive infiltrative mediastinal lymphadenopathy. This  infiltrative lymphadenopathy results in narrowing/occlusion of the  left upper lobe bronchus. This is most severe within the anterior  mediastinum with extension within the AP window paratracheal and  subcarinal mediastinal soft tissue Esophagus appears within normal  limits as seen.    HEART AND VESSELS:  There is extensive atherosclerotic calcification within the thoracic  aorta. Main pulmonary artery and its branches are normal in caliber.  Moderate coronary artery calcifications.  The cardiac chambers are not enlarged.  There is no pericardial effusion seen.    UPPER ABDOMEN:  There is cholelithiasis.  Extensive atherosclerotic calcification of the abdominal aorta.        CHEST WALL AND OSSEOUS STRUCTURES:  Chest wall is within normal limits.  No acute osseous pathology.There are no suspicious osseous lesions.    Impression  1. Extensive infiltrative left upper lobe masslike opacity which  results in endobronchial narrowing/occlusion of the left upper lobe  bronchus, concerning for bronchogenic neoplasm.  2. There is extensive infiltrative mediastinal lymphadenopathy most  likely neoplastic  3. Recommend PET-CT for evaluation and bronchoscopic sampling  evaluation of the left upper lobe bronchus.      LUNG RADS CATEGORY:  Lung Rad: Lung-RADS 4X (Very Suspicious)    Recommendation: Further evaluation with diagnostic chest CT with or  without contrast, may consider PET/CT if solid component is ? 8mm  (268 mm3) or tissue sampling depending on probability of malignancy,  recommended as per American College of Radiology Guidelines Lung-RADS  Version 2022. Management depends on clinical evaluation,  "patient  preference, and the probability of malignancy  (https://brocku.ca/lung-cancer-screening-and-risk-prediction/risk-calc  ulators/) Recommend F/U with Thoracic Surgeon.      MACRO:  Critical Finding:  See findings. Notification was initiated on  12/15/2023 at 6:55 am by  Harley Ferreira.  (**-YCF-**) Instructions:    Signed by: Harley Ferreira 12/15/2023 6:56 AM  Dictation workstation:   [id number]      Echocardiogram     No results found for this or any previous visit from the past 365 days.       Labwork   Complete Blood Count  Lab Results   Component Value Date    WBC 9.9 12/05/2023    HGB 15.2 12/05/2023    HCT 47.1 12/05/2023    MCV 95 12/05/2023     12/05/2023       Peripheral Eosinophil Count/Percentage:   Eosinophils Absolute (x10*3/uL)   Date Value   12/05/2023 0.45     Eosinophils % (%)   Date Value   12/05/2023 4.6       Serum Immunoglobulin E:    No results found for: \"IGE\"     ASSESSMENT/PLAN   Mr. Franco is a 63 y.o. male, who    Problem List and Orders      Assessment and Plan / Recommendations:  Problem List Items Addressed This Visit    None        Patient's visit was converted to a telephone visit given current COVID- 19 pandemic.     1. Left lung mass:   - plan for bronchoscopy with biopsy on 1/3/24  - labwork prior to procedure   - Not on any anticoagulation     I explained the procedure to the patient. We discussed that the bronchoscopy will be performed by a member of the Interventional Pulmonary Team (Dr Brown Guzman,  Dr. Breanne Samuels, or Dr. Tee Durham) depending on scheduling and provider availability. We also discussed that the IP providers function as a team and not infrequently may have to fill in for one another if there are emergent issues that need attention at the same time. The patient / family expressed understanding and agreed to proceed. All questions were answered.     Thank you for visiting the Pulmonary clinic today!   Sylvia Goldberg CNP  [phone] " [phone]    Follow up as needed.    If you have any questions please call the office [phone]    Thank you for visiting the Pulmonary clinic today!   Sylvia Goldberg CNP  [phone]

## 2023-12-31 NOTE — H&P (VIEW-ONLY)
Patient: Mike Franco    [id number]  : 1960 -- AGE 63 y.o.    Provider: Sylvia PITT [facility]     Location [facility]   Service Date: 24          [facility]  New Visit Note    Virtual or Telephone Consent  A telephone visit (audio only) between the patient (at the originating site) and the provider (at the distant site) was utilized to provide this telehealth service.   Verbal consent was requested and obtained from Mike Franco on this date, 24 for a telehealth visit.     HISTORY OF PRESENT ILLNESS     The patient's referring provider is: No ref. provider found    HISTORY OF PRESENT ILLNESS   Mike Franco is a 63 y.o. male who is a former smoker (21 pack years), who presents to a [facility] for an initial evaluation for pre-bronchoscopy visit for a left lung mass    I have independently interviewed and examined the patient in the office and reviewed available records.    Current History    On today's visit, the patient reports he has a productive cough with clear mucus. He states his voice has been hoarse. He wheezes when he lays down. He has shortness of breath when he goes outside and has a little pain on his left chest area. He reports that his PCP prescribed antibiotic and steroids in December for a chest cold.      Previous pulmonary history: He has no history of recurrent infections, or lung disease as a child.  He had no previous lung hx, never on oxygen or inhaler therapy.     Inhalers/nebulized medications: no    Hospitalization History: He has not been hospitalized over the last year for breathing related problem.    Sleep history:  Denies snoring, apnea, feeling tired during the day or taking naps during the day.         ALLERGIES AND MEDICATIONS     ALLERGIES  No Known Allergies    MEDICATIONS  Current Outpatient Medications   Medication Sig Dispense Refill    ALPRAZolam (Xanax) 0.25 mg tablet Take  1 tablet (0.25 mg) by mouth 3 times a day as needed for anxiety. 30 tablet 0    dextromethorphan-guaifenesin (Mucinex DM)  mg 12 hr tablet Take 1 tablet by mouth every 12 hours. Do not crush, chew, or split.      doxycycline (Adoxa) 100 mg tablet Take 1 tablet (100 mg) by mouth every 12 hours.      EPINEPHrine 0.3 mg/0.3 mL injection syringe Inject 0.3 mL (0.3 mg) into the muscle. As Directed      gabapentin (Neurontin) 100 mg capsule Take 1 capsule (100 mg) by mouth once daily at bedtime.      rosuvastatin (Crestor) 5 mg tablet Take 1 tablet (5 mg) by mouth once daily.       No current facility-administered medications for this visit.         PAST HISTORY     PAST MEDICAL HISTORY      PAST SURGICAL HISTORY  Past Surgical History:   Procedure Laterality Date    HERNIA REPAIR      LEG AMPUTATION Right        IMMUNIZATION HISTORY  Immunization History   Administered Date(s) Administered    Flu vaccine (IIV4), preservative free *Check age/dose* 10/23/2020    Influenza, Unspecified 10/08/2013, 10/18/2016    Influenza, seasonal, injectable 10/16/2014, 2017    Influenza, seasonal, injectable, preservative free 10/20/2015    Moderna SARS-CoV-2 Vaccination 2021, 2021, 2022       SOCIAL HISTORY  Tobacco Smokin- 62 - 1/2 pack/day ~ 21 pack years  Illicit drugs: none  Alcohol consumption: occasionally  Pets: cat    OCCUPATIONAL/ENVIRONMENTAL HISTORY  Occupation:   Unknown  fiberglass. Unknown exposure to asbestos, silica, beryllium or inhaled metals.  No exposure to birds or exotic animals.    FAMILY HISTORY  Family History   Problem Relation Name Age of Onset    Cancer Mother       No family history of pulmonary disease.  Mother - cancer unknown.   Un family history of autoimmune disorders.    REVIEW OF SYSTEMS     REVIEW OF SYSTEMS  Review of Systems    Constitutional: No fever, no chills, no night sweats.    Eyes: No double vision, no floaters, no dry eyes.   ENT: See HPI.   Neck: No neck  stiffness.  Cardiovascular: No sharp chest pain, no heart racing, no leg swelling.  Respiratory: as noted in HPI.   Gastrointestinal: No nausea, no vomiting, no diarrhea.   Musculoskeletal: No joint pain, no back pain.   Integumentary: No rashes or sores.  Neurological: No dizziness, no headaches. Sleeping well.  Psychiatric: No mood changes.       PHYSICAL EXAM     VITAL SIGNS: There were no vitals taken for this visit.     CURRENT WEIGHT: There is no height or weight on file to calculate BMI.  PREVIOUS WEIGHTS:  Wt Readings from Last 3 Encounters:   12/21/23 103 kg (227 lb)   12/07/23 102 kg (225 lb)   06/06/23 102 kg (225 lb)       Physical Exam    Telephone visit    RESULTS/DATA     Pulmonary Function Test Results         None on record    Chest Radiograph     XR CHEST 1 VIEW 04/04/2020    Narrative  MRN: [id number]  Patient Name: GERARDO FONSECA    STUDY:  CHEST 1 VIEW    INDICATION:  Chest Pain.    COMPARISON:  February 12, 2020    ACCESSION NUMBER(S):  [id number]    ORDERING CLINICIAN:  BRANDY GOETZ    FINDINGS:  No consolidation, effusion, edema, or pneumothorax. Heart size within  normal limits.    Impression  No evidence of acute intrathoracic abnormality.      Chest CT Scan     CT lung screening low dose 12/14/2023    Narrative  Interpreted By:  Harley Ferreira,  STUDY:  CT LUNG SCREENING LOW DOSE;  12/14/2023 4:14 pm    INDICATION:  Signs/Symptoms: Nicotine dependence in remission  F17.201: Nicotine  dependence in remission.    COMPARISON:  11/30/2021    ACCESSION NUMBER(S):  [id number]    ORDERING CLINICIAN:  MATEO QUEZADA    TECHNIQUE:  Helical data acquisition of the chest was obtained without IV  contrast material.  Images were reformatted in axial, coronal, and  sagittal planes.    FINDINGS:  LUNGS AND AIRWAYS:  There is narrowing/occlusion of the left upper lobe bronchus.  Endobronchial invasion is suspected..    There is severe centrilobular and paraseptal emphysematous  changes.There has been  interval development of multifocal left upper  lobe masslike opacities. This infiltrative upper lobe soft tissue  measures 8.2 x 1.1 cm in dimension.        MEDIASTINUM AND VAL, LOWER NECK AND AXILLA:  The visualized thyroid gland is within normal limits.  There is extensive infiltrative mediastinal lymphadenopathy. This  infiltrative lymphadenopathy results in narrowing/occlusion of the  left upper lobe bronchus. This is most severe within the anterior  mediastinum with extension within the AP window paratracheal and  subcarinal mediastinal soft tissue Esophagus appears within normal  limits as seen.    HEART AND VESSELS:  There is extensive atherosclerotic calcification within the thoracic  aorta. Main pulmonary artery and its branches are normal in caliber.  Moderate coronary artery calcifications.  The cardiac chambers are not enlarged.  There is no pericardial effusion seen.    UPPER ABDOMEN:  There is cholelithiasis.  Extensive atherosclerotic calcification of the abdominal aorta.        CHEST WALL AND OSSEOUS STRUCTURES:  Chest wall is within normal limits.  No acute osseous pathology.There are no suspicious osseous lesions.    Impression  1. Extensive infiltrative left upper lobe masslike opacity which  results in endobronchial narrowing/occlusion of the left upper lobe  bronchus, concerning for bronchogenic neoplasm.  2. There is extensive infiltrative mediastinal lymphadenopathy most  likely neoplastic  3. Recommend PET-CT for evaluation and bronchoscopic sampling  evaluation of the left upper lobe bronchus.      LUNG RADS CATEGORY:  Lung Rad: Lung-RADS 4X (Very Suspicious)    Recommendation: Further evaluation with diagnostic chest CT with or  without contrast, may consider PET/CT if solid component is ? 8mm  (268 mm3) or tissue sampling depending on probability of malignancy,  recommended as per American College of Radiology Guidelines Lung-RADS  Version 2022. Management depends on clinical evaluation,  "patient  preference, and the probability of malignancy  (https://brocku.ca/lung-cancer-screening-and-risk-prediction/risk-calc  ulators/) Recommend F/U with Thoracic Surgeon.      MACRO:  Critical Finding:  See findings. Notification was initiated on  12/15/2023 at 6:55 am by  Harley Ferreira.  (**-YCF-**) Instructions:    Signed by: Harley Ferreira 12/15/2023 6:56 AM  Dictation workstation:   [id number]      Echocardiogram     No results found for this or any previous visit from the past 365 days.       Labwork   Complete Blood Count  Lab Results   Component Value Date    WBC 9.9 12/05/2023    HGB 15.2 12/05/2023    HCT 47.1 12/05/2023    MCV 95 12/05/2023     12/05/2023       Peripheral Eosinophil Count/Percentage:   Eosinophils Absolute (x10*3/uL)   Date Value   12/05/2023 0.45     Eosinophils % (%)   Date Value   12/05/2023 4.6       Serum Immunoglobulin E:    No results found for: \"IGE\"     ASSESSMENT/PLAN   Mr. Franco is a 63 y.o. male, who    Problem List and Orders      Assessment and Plan / Recommendations:  Problem List Items Addressed This Visit    None        Patient's visit was converted to a telephone visit given current COVID- 19 pandemic.     1. Left lung mass:   - plan for bronchoscopy with biopsy on 1/3/24  - labwork prior to procedure   - Not on any anticoagulation     I explained the procedure to the patient. We discussed that the bronchoscopy will be performed by a member of the Interventional Pulmonary Team (Dr Brwon Guzman,  Dr. Breanne Samuels, or Dr. Tee Durham) depending on scheduling and provider availability. We also discussed that the IP providers function as a team and not infrequently may have to fill in for one another if there are emergent issues that need attention at the same time. The patient / family expressed understanding and agreed to proceed. All questions were answered.     Thank you for visiting the Pulmonary clinic today!   Sylvia Goldberg CNP  [phone] " [phone]    Follow up as needed.    If you have any questions please call the office [phone]    Thank you for visiting the Pulmonary clinic today!   Sylvia Goldberg CNP  [phone]

## 2024-01-02 ENCOUNTER — TELEMEDICINE (OUTPATIENT)
Dept: PULMONOLOGY | Facility: CLINIC | Age: 64
End: 2024-01-02
Payer: COMMERCIAL

## 2024-01-02 DIAGNOSIS — R91.8 LUNG MASS: Primary | ICD-10-CM

## 2024-01-02 PROCEDURE — 99204 OFFICE O/P NEW MOD 45 MIN: CPT

## 2024-01-03 ENCOUNTER — ANESTHESIA EVENT (OUTPATIENT)
Dept: GASTROENTEROLOGY | Facility: HOSPITAL | Age: 64
End: 2024-01-03
Payer: COMMERCIAL

## 2024-01-03 ENCOUNTER — ANESTHESIA (OUTPATIENT)
Dept: GASTROENTEROLOGY | Facility: HOSPITAL | Age: 64
End: 2024-01-03
Payer: COMMERCIAL

## 2024-01-03 ENCOUNTER — HOSPITAL ENCOUNTER (OUTPATIENT)
Dept: GASTROENTEROLOGY | Facility: HOSPITAL | Age: 64
Setting detail: OUTPATIENT SURGERY
Discharge: HOME | End: 2024-01-03
Payer: COMMERCIAL

## 2024-01-03 VITALS
WEIGHT: 225 LBS | HEART RATE: 80 BPM | DIASTOLIC BLOOD PRESSURE: 63 MMHG | OXYGEN SATURATION: 95 % | HEIGHT: 74 IN | BODY MASS INDEX: 28.88 KG/M2 | TEMPERATURE: 97.2 F | RESPIRATION RATE: 20 BRPM | SYSTOLIC BLOOD PRESSURE: 111 MMHG

## 2024-01-03 DIAGNOSIS — R91.8 LUNG MASS: ICD-10-CM

## 2024-01-03 PROCEDURE — 31652 BRONCH EBUS SAMPLNG 1/2 NODE: CPT | Performed by: INTERNAL MEDICINE

## 2024-01-03 PROCEDURE — 2500000004 HC RX 250 GENERAL PHARMACY W/ HCPCS (ALT 636 FOR OP/ED): Performed by: ANESTHESIOLOGIST ASSISTANT

## 2024-01-03 PROCEDURE — 7100000010 HC PHASE TWO TIME - EACH INCREMENTAL 1 MINUTE

## 2024-01-03 PROCEDURE — 3700000001 HC GENERAL ANESTHESIA TIME - INITIAL BASE CHARGE

## 2024-01-03 PROCEDURE — A31652 PR BRNCHSC EBUS GUIDED SAMPL 1/2 NODE STATION/STRUX: Performed by: ANESTHESIOLOGIST ASSISTANT

## 2024-01-03 PROCEDURE — A31652 PR BRNCHSC EBUS GUIDED SAMPL 1/2 NODE STATION/STRUX: Performed by: STUDENT IN AN ORGANIZED HEALTH CARE EDUCATION/TRAINING PROGRAM

## 2024-01-03 PROCEDURE — 88189 FLOWCYTOMETRY/READ 16 & >: CPT | Performed by: INTERNAL MEDICINE

## 2024-01-03 PROCEDURE — 2500000002 HC RX 250 W HCPCS SELF ADMINISTERED DRUGS (ALT 637 FOR MEDICARE OP, ALT 636 FOR OP/ED): Performed by: STUDENT IN AN ORGANIZED HEALTH CARE EDUCATION/TRAINING PROGRAM

## 2024-01-03 PROCEDURE — 7100000009 HC PHASE TWO TIME - INITIAL BASE CHARGE

## 2024-01-03 PROCEDURE — 3700000002 HC GENERAL ANESTHESIA TIME - EACH INCREMENTAL 1 MINUTE

## 2024-01-03 PROCEDURE — 88341 IMHCHEM/IMCYTCHM EA ADD ANTB: CPT | Performed by: PATHOLOGY

## 2024-01-03 PROCEDURE — 88185 FLOWCYTOMETRY/TC ADD-ON: CPT | Mod: TC,AHULAB | Performed by: INTERNAL MEDICINE

## 2024-01-03 PROCEDURE — 88342 IMHCHEM/IMCYTCHM 1ST ANTB: CPT | Mod: TC,SUR,AHULAB | Performed by: INTERNAL MEDICINE

## 2024-01-03 PROCEDURE — 88305 TISSUE EXAM BY PATHOLOGIST: CPT | Performed by: PATHOLOGY

## 2024-01-03 PROCEDURE — 7100000001 HC RECOVERY ROOM TIME - INITIAL BASE CHARGE

## 2024-01-03 PROCEDURE — 88172 CYTP DX EVAL FNA 1ST EA SITE: CPT | Performed by: PATHOLOGY

## 2024-01-03 PROCEDURE — 2500000005 HC RX 250 GENERAL PHARMACY W/O HCPCS: Performed by: ANESTHESIOLOGIST ASSISTANT

## 2024-01-03 PROCEDURE — 88342 IMHCHEM/IMCYTCHM 1ST ANTB: CPT | Performed by: PATHOLOGY

## 2024-01-03 PROCEDURE — 88173 CYTOPATH EVAL FNA REPORT: CPT | Performed by: PATHOLOGY

## 2024-01-03 PROCEDURE — 2720000007 HC OR 272 NO HCPCS

## 2024-01-03 PROCEDURE — 88173 CYTOPATH EVAL FNA REPORT: CPT | Mod: TC | Performed by: INTERNAL MEDICINE

## 2024-01-03 PROCEDURE — 31654 BRONCH EBUS IVNTJ PERPH LES: CPT | Performed by: INTERNAL MEDICINE

## 2024-01-03 PROCEDURE — 2500000005 HC RX 250 GENERAL PHARMACY W/O HCPCS: Performed by: STUDENT IN AN ORGANIZED HEALTH CARE EDUCATION/TRAINING PROGRAM

## 2024-01-03 PROCEDURE — 7100000002 HC RECOVERY ROOM TIME - EACH INCREMENTAL 1 MINUTE

## 2024-01-03 PROCEDURE — 31629 BRONCHOSCOPY/NEEDLE BX EACH: CPT | Performed by: INTERNAL MEDICINE

## 2024-01-03 RX ORDER — PROPOFOL 10 MG/ML
INJECTION, EMULSION INTRAVENOUS CONTINUOUS PRN
Status: DISCONTINUED | OUTPATIENT
Start: 2024-01-03 | End: 2024-01-03

## 2024-01-03 RX ORDER — BISMUTH SUBSALICYLATE 262 MG
1 TABLET,CHEWABLE ORAL DAILY
COMMUNITY

## 2024-01-03 RX ORDER — LABETALOL HYDROCHLORIDE 5 MG/ML
5 INJECTION, SOLUTION INTRAVENOUS ONCE AS NEEDED
Status: DISCONTINUED | OUTPATIENT
Start: 2024-01-03 | End: 2024-01-04 | Stop reason: HOSPADM

## 2024-01-03 RX ORDER — ONDANSETRON HYDROCHLORIDE 2 MG/ML
4 INJECTION, SOLUTION INTRAVENOUS ONCE AS NEEDED
Status: DISCONTINUED | OUTPATIENT
Start: 2024-01-03 | End: 2024-01-04 | Stop reason: HOSPADM

## 2024-01-03 RX ORDER — SODIUM CHLORIDE, SODIUM LACTATE, POTASSIUM CHLORIDE, CALCIUM CHLORIDE 600; 310; 30; 20 MG/100ML; MG/100ML; MG/100ML; MG/100ML
100 INJECTION, SOLUTION INTRAVENOUS CONTINUOUS
Status: DISCONTINUED | OUTPATIENT
Start: 2024-01-03 | End: 2024-01-04 | Stop reason: HOSPADM

## 2024-01-03 RX ORDER — OXYCODONE HYDROCHLORIDE 5 MG/1
5 TABLET ORAL EVERY 4 HOURS PRN
Status: DISCONTINUED | OUTPATIENT
Start: 2024-01-03 | End: 2024-01-04 | Stop reason: HOSPADM

## 2024-01-03 RX ORDER — PHENYLEPHRINE HCL IN 0.9% NACL 0.4MG/10ML
SYRINGE (ML) INTRAVENOUS AS NEEDED
Status: DISCONTINUED | OUTPATIENT
Start: 2024-01-03 | End: 2024-01-03

## 2024-01-03 RX ORDER — FENTANYL CITRATE 50 UG/ML
INJECTION, SOLUTION INTRAMUSCULAR; INTRAVENOUS AS NEEDED
Status: DISCONTINUED | OUTPATIENT
Start: 2024-01-03 | End: 2024-01-03

## 2024-01-03 RX ORDER — ROCURONIUM BROMIDE 10 MG/ML
INJECTION, SOLUTION INTRAVENOUS AS NEEDED
Status: DISCONTINUED | OUTPATIENT
Start: 2024-01-03 | End: 2024-01-03

## 2024-01-03 RX ORDER — PROPOFOL 10 MG/ML
INJECTION, EMULSION INTRAVENOUS AS NEEDED
Status: DISCONTINUED | OUTPATIENT
Start: 2024-01-03 | End: 2024-01-03

## 2024-01-03 RX ORDER — DIPHENHYDRAMINE HYDROCHLORIDE 50 MG/ML
12.5 INJECTION INTRAMUSCULAR; INTRAVENOUS ONCE AS NEEDED
Status: DISCONTINUED | OUTPATIENT
Start: 2024-01-03 | End: 2024-01-04 | Stop reason: HOSPADM

## 2024-01-03 RX ORDER — LIDOCAINE HYDROCHLORIDE 10 MG/ML
0.1 INJECTION, SOLUTION EPIDURAL; INFILTRATION; INTRACAUDAL; PERINEURAL ONCE
Status: DISCONTINUED | OUTPATIENT
Start: 2024-01-03 | End: 2024-01-04 | Stop reason: HOSPADM

## 2024-01-03 RX ORDER — IPRATROPIUM BROMIDE AND ALBUTEROL SULFATE 2.5; .5 MG/3ML; MG/3ML
3 SOLUTION RESPIRATORY (INHALATION)
Status: DISCONTINUED | OUTPATIENT
Start: 2024-01-03 | End: 2024-01-04 | Stop reason: HOSPADM

## 2024-01-03 RX ORDER — MIDAZOLAM HYDROCHLORIDE 1 MG/ML
INJECTION, SOLUTION INTRAMUSCULAR; INTRAVENOUS AS NEEDED
Status: DISCONTINUED | OUTPATIENT
Start: 2024-01-03 | End: 2024-01-03

## 2024-01-03 RX ADMIN — Medication 200 MCG: at 11:29

## 2024-01-03 RX ADMIN — Medication 200 MCG: at 11:04

## 2024-01-03 RX ADMIN — SODIUM CHLORIDE, SODIUM LACTATE, POTASSIUM CHLORIDE, AND CALCIUM CHLORIDE: 600; 310; 30; 20 INJECTION, SOLUTION INTRAVENOUS at 10:28

## 2024-01-03 RX ADMIN — MIDAZOLAM HYDROCHLORIDE 2 MG: 1 INJECTION INTRAMUSCULAR; INTRAVENOUS at 10:23

## 2024-01-03 RX ADMIN — Medication 200 MCG: at 10:50

## 2024-01-03 RX ADMIN — Medication 6 L/MIN: at 12:30

## 2024-01-03 RX ADMIN — Medication 200 MCG: at 11:23

## 2024-01-03 RX ADMIN — PROPOFOL 200 MCG/KG/MIN: 10 INJECTION, EMULSION INTRAVENOUS at 10:36

## 2024-01-03 RX ADMIN — Medication 200 MCG: at 10:41

## 2024-01-03 RX ADMIN — Medication 200 MCG: at 11:00

## 2024-01-03 RX ADMIN — IPRATROPIUM BROMIDE AND ALBUTEROL SULFATE 3 ML: 2.5; .5 SOLUTION RESPIRATORY (INHALATION) at 12:24

## 2024-01-03 RX ADMIN — ROCURONIUM BROMIDE 50 MG: 10 INJECTION, SOLUTION INTRAVENOUS at 10:36

## 2024-01-03 RX ADMIN — FENTANYL CITRATE 50 MCG: 50 INJECTION, SOLUTION INTRAMUSCULAR; INTRAVENOUS at 10:36

## 2024-01-03 RX ADMIN — PROPOFOL 100 MG: 10 INJECTION, EMULSION INTRAVENOUS at 10:36

## 2024-01-03 ASSESSMENT — PAIN SCALES - GENERAL
PAINLEVEL_OUTOF10: 0 - NO PAIN
PAINLEVEL_OUTOF10: 0 - NO PAIN
PAINLEVEL_OUTOF10: 3
PAINLEVEL_OUTOF10: 0 - NO PAIN
PAINLEVEL_OUTOF10: 3
PAINLEVEL_OUTOF10: 0 - NO PAIN
PAINLEVEL_OUTOF10: 0 - NO PAIN

## 2024-01-03 ASSESSMENT — PAIN - FUNCTIONAL ASSESSMENT
PAIN_FUNCTIONAL_ASSESSMENT: 0-10

## 2024-01-03 ASSESSMENT — COLUMBIA-SUICIDE SEVERITY RATING SCALE - C-SSRS
1. IN THE PAST MONTH, HAVE YOU WISHED YOU WERE DEAD OR WISHED YOU COULD GO TO SLEEP AND NOT WAKE UP?: NO
2. HAVE YOU ACTUALLY HAD ANY THOUGHTS OF KILLING YOURSELF?: NO
6. HAVE YOU EVER DONE ANYTHING, STARTED TO DO ANYTHING, OR PREPARED TO DO ANYTHING TO END YOUR LIFE?: NO

## 2024-01-03 NOTE — ANESTHESIA PREPROCEDURE EVALUATION
Patient: Mike Franco    Procedure Information       Date/Time: 01/03/24 1030    Scheduled providers: Breanne GUERRERO MD; Vlad Amin MD; BECKY Salmeron; Brian Monroe RN; Ana Mckeon MA; Jie Casas RN    Procedure: BRONCHOSCOPY    Location: [facility]            Relevant Problems   Cardiovascular   (+) CAD (coronary artery disease), native coronary artery   (+) Hypertension      Neuro/Psych   (+) Anxiety associated with depression   (+) Idiopathic peripheral neuropathy      Pulmonary   (+) Chronic obstructive pulmonary disease (CMS/HCC)   (+) Cough variant asthma      GI/Hepatic   (+) Cholelithiasis with choledocholithiasis      Musculoskeletal   (+) Osteoarthritis of left hip      Infectious Disease   (+) Furunculosis   (+) Hidradenitis suppurativa       Clinical information reviewed:   Tobacco  Allergies  Meds   Med Hx  Surg Hx   Fam Hx  Soc Hx        NPO Detail:  NPO/Void Status  Carbonhydrate Drink Given Prior to Surgery? : N  Date of Last Liquid: 01/02/24  Time of Last Liquid: 2000  Date of Last Solid: 01/02/24  Time of Last Solid: 1700  Last Intake Type: Clear fluids         Physical Exam    Airway  Mallampati: II  TM distance: >3 FB  Neck ROM: full     Cardiovascular   Rhythm: regular  Rate: normal     Dental    Pulmonary   Breath sounds clear to auscultation     Abdominal            Anesthesia Plan    ASA 3     general     intravenous induction   Anesthetic plan and risks discussed with patient.    Plan discussed with CRNA.

## 2024-01-03 NOTE — PERIOPERATIVE NURSING NOTE
1210 Assumed care of patient. Patient awake and alert, no complaints. Patient has moist, nonproductive forceful cough. Room air sats 92%.     1224 SBP in 80's. Patient awake and alert, no complaints. Dr. Amin at bedside and is aware, will continue IVF. Duoneb treatment started for cough.     1230 Fiance updated via text message.    1237 Breathing treatment finished. /58. Cough resolving.     1241 Tolerating PO liquids and Jello.     1305 Fiance updated, she is here in the lobby. Patient meets criteria for Phase 2. Cough resolved.     1306 Transferred to Phase 2.

## 2024-01-03 NOTE — POST-PROCEDURE NOTE
1320 Received report from Hoa and pt waiting Dr at this time.  1340 Dr Samuels at bedside.  1346 Pt get dressing with wife this time.  1352 Discharge instruction reviewed with pt and family by nurse  1402 IV removed

## 2024-01-03 NOTE — ANESTHESIA POSTPROCEDURE EVALUATION
Patient: Mike Franco    Procedure Summary       Date: 01/03/24 Room / Location: [facility]    Anesthesia Start: 1028 Anesthesia Stop: 1201    Procedure: BRONCHOSCOPY Diagnosis: Lung mass    Scheduled Providers: Breanne GUERRERO MD; Vlad Amin MD; BECKY Salmeron; Brian Monroe RN; Ana Mckeon MA; Jie Casas RN Responsible Provider: Vlad Amin MD    Anesthesia Type: general ASA Status: 3            Anesthesia Type: general    Vitals Value Taken Time   /63 01/03/24 1306   Temp 36.2 °C (97.2 °F) 01/03/24 1306   Pulse 75 01/03/24 1306   Resp 20 01/03/24 1306   SpO2 96 % 01/03/24 1306       Anesthesia Post Evaluation    Patient location during evaluation: bedside  Patient participation: complete - patient participated  Level of consciousness: awake  Pain management: adequate  Multimodal analgesia pain management approach  Airway patency: patent  Cardiovascular status: stable  Respiratory status: spontaneous ventilation and unassisted  Hydration status: acceptable  Postoperative Nausea and Vomiting: none  Comments: No significant PONV.        No notable events documented.

## 2024-01-03 NOTE — ANESTHESIA PROCEDURE NOTES
Airway  Date/Time: 1/3/2024 10:38 AM  Urgency: elective    Airway not difficult    Staffing  Performed: BECKY   Authorized by: Vlad Amin MD    Performed by: BECKY Salmeron  Patient location during procedure: OR    Indications and Patient Condition  Indications for airway management: anesthesia  Spontaneous ventilation: present  Sedation level: moderate (conscious sedation)  Preoxygenated: yes  Patient position: sniffing  Mask difficulty assessment: 2 - vent by mask + OA or adjuvant +/- NMBA  Planned trial extubation    Final Airway Details  Final airway type: endotracheal airway      Successful airway: ETT  Cuffed: yes   Successful intubation technique: direct laryngoscopy  Facilitating devices/methods: intubating stylet  Endotracheal tube insertion site: oral  Blade: Tyrese  Blade size: #3  ETT size (mm): 8.5  Cormack-Lehane Classification: grade I - full view of glottis  Placement verified by: capnometry   Inital cuff pressure (cm H2O): 22  Measured from: lips  Number of attempts at approach: 1

## 2024-01-04 NOTE — SIGNIFICANT EVENT
Coughing mucus that is blood tinged. Instructed patient to go to ED if coughing up blood >or = 1 tbs.

## 2024-01-04 NOTE — ADDENDUM NOTE
Encounter addended by: Bita Roth RN on: 1/4/2024 9:02 AM   Actions taken: Contacts section saved, Clinical Note Signed, Flowsheet accepted

## 2024-01-08 ENCOUNTER — HOSPITAL ENCOUNTER (OUTPATIENT)
Dept: RADIOLOGY | Facility: HOSPITAL | Age: 64
Discharge: HOME | End: 2024-01-08
Payer: COMMERCIAL

## 2024-01-08 DIAGNOSIS — R91.8 MASS OF LEFT LUNG: ICD-10-CM

## 2024-01-08 PROCEDURE — 3430000001 HC RX 343 DIAGNOSTIC RADIOPHARMACEUTICALS: Performed by: INTERNAL MEDICINE

## 2024-01-08 PROCEDURE — 78815 PET IMAGE W/CT SKULL-THIGH: CPT | Mod: PI

## 2024-01-08 PROCEDURE — A9552 F18 FDG: HCPCS | Performed by: INTERNAL MEDICINE

## 2024-01-08 PROCEDURE — 78815 PET IMAGE W/CT SKULL-THIGH: CPT | Mod: PET TUMOR INIT TX STRAT | Performed by: NUCLEAR MEDICINE

## 2024-01-08 RX ORDER — FLUDEOXYGLUCOSE F 18 200 MCI/ML
13.3 INJECTION, SOLUTION INTRAVENOUS
Status: COMPLETED | OUTPATIENT
Start: 2024-01-08 | End: 2024-01-08

## 2024-01-08 RX ADMIN — FLUDEOXYGLUCOSE F 18 13.3 MILLICURIE: 200 INJECTION, SOLUTION INTRAVENOUS at 12:53

## 2024-01-09 DIAGNOSIS — Z00.00 HEALTHCARE MAINTENANCE: ICD-10-CM

## 2024-01-09 LAB
CELL COUNT (BLOOD): 4.98 X10*3/UL
CELL POPULATIONS: NORMAL
DIAGNOSIS: NORMAL
FLOW DIFFERENTIAL: NORMAL
FLOW TEST ORDERED: NORMAL
LAB TEST METHOD: NORMAL
NUMBER OF CELLS COLLECTED: NORMAL PER TUBE
PATH REPORT.TOTAL CANCER: NORMAL
SIGNATURE COMMENT: NORMAL
SPECIMEN VIABILITY: NORMAL

## 2024-01-09 RX ORDER — AMOXICILLIN 500 MG/1
2000 CAPSULE ORAL AS NEEDED
Qty: 8 CAPSULE | Refills: 0 | Status: SHIPPED | OUTPATIENT
Start: 2024-01-09 | End: 2024-01-15 | Stop reason: HOSPADM
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
## 2024-01-10 ENCOUNTER — OFFICE VISIT (OUTPATIENT)
Dept: HEMATOLOGY/ONCOLOGY | Facility: CLINIC | Age: 64
End: 2024-01-10
Payer: COMMERCIAL

## 2024-01-10 VITALS
SYSTOLIC BLOOD PRESSURE: 122 MMHG | WEIGHT: 207.45 LBS | HEART RATE: 76 BPM | OXYGEN SATURATION: 94 % | BODY MASS INDEX: 29.04 KG/M2 | DIASTOLIC BLOOD PRESSURE: 78 MMHG | RESPIRATION RATE: 18 BRPM | TEMPERATURE: 98.8 F | HEIGHT: 71 IN

## 2024-01-10 DIAGNOSIS — C34.12 SMALL CELL LUNG CANCER, LEFT UPPER LOBE (MULTI): ICD-10-CM

## 2024-01-10 DIAGNOSIS — R91.8 MASS OF LEFT LUNG: ICD-10-CM

## 2024-01-10 DIAGNOSIS — C34.12 PRIMARY CANCER OF LEFT UPPER LOBE OF LUNG (MULTI): Primary | ICD-10-CM

## 2024-01-10 LAB
LAB AP ASR DISCLAIMER: NORMAL
LABORATORY COMMENT REPORT: NORMAL
LABORATORY COMMENT REPORT: NORMAL
PATH REPORT.FINAL DX SPEC: NORMAL
PATH REPORT.GROSS SPEC: NORMAL
PATH REPORT.INTRAOP OBS SPEC DOC: NORMAL
PATH REPORT.TOTAL CANCER: NORMAL

## 2024-01-10 PROCEDURE — 3074F SYST BP LT 130 MM HG: CPT | Performed by: INTERNAL MEDICINE

## 2024-01-10 PROCEDURE — 3078F DIAST BP <80 MM HG: CPT | Performed by: INTERNAL MEDICINE

## 2024-01-10 PROCEDURE — 99215 OFFICE O/P EST HI 40 MIN: CPT | Performed by: INTERNAL MEDICINE

## 2024-01-10 PROCEDURE — 99205 OFFICE O/P NEW HI 60 MIN: CPT | Performed by: INTERNAL MEDICINE

## 2024-01-10 PROCEDURE — 1036F TOBACCO NON-USER: CPT | Performed by: INTERNAL MEDICINE

## 2024-01-10 ASSESSMENT — PAIN SCALES - GENERAL: PAINLEVEL: 4

## 2024-01-11 ENCOUNTER — HOSPITAL ENCOUNTER (INPATIENT)
Facility: HOSPITAL | Age: 64
LOS: 4 days | Discharge: HOME | DRG: 847 | End: 2024-01-15
Attending: HOSPITALIST | Admitting: HOSPITALIST
Payer: COMMERCIAL

## 2024-01-11 ENCOUNTER — OFFICE VISIT (OUTPATIENT)
Dept: HEMATOLOGY/ONCOLOGY | Facility: HOSPITAL | Age: 64
DRG: 847 | End: 2024-01-11
Payer: COMMERCIAL

## 2024-01-11 ENCOUNTER — APPOINTMENT (OUTPATIENT)
Dept: RADIOLOGY | Facility: HOSPITAL | Age: 64
DRG: 847 | End: 2024-01-11
Payer: COMMERCIAL

## 2024-01-11 DIAGNOSIS — C34.12 PRIMARY CANCER OF LEFT UPPER LOBE OF LUNG (MULTI): ICD-10-CM

## 2024-01-11 DIAGNOSIS — C34.90 LUNG CANCER METASTATIC TO BONE (MULTI): Primary | ICD-10-CM

## 2024-01-11 DIAGNOSIS — C34.12 SMALL CELL LUNG CANCER, LEFT UPPER LOBE (MULTI): ICD-10-CM

## 2024-01-11 DIAGNOSIS — C34.12 SMALL CELL LUNG CANCER, LEFT UPPER LOBE (MULTI): Primary | ICD-10-CM

## 2024-01-11 DIAGNOSIS — R91.8 MASS OF LEFT LUNG: ICD-10-CM

## 2024-01-11 DIAGNOSIS — C79.51 LUNG CANCER METASTATIC TO BONE (MULTI): Primary | ICD-10-CM

## 2024-01-11 LAB
ALBUMIN SERPL BCP-MCNC: 3.8 G/DL (ref 3.4–5)
ALP SERPL-CCNC: 50 U/L (ref 33–136)
ALT SERPL W P-5'-P-CCNC: 8 U/L (ref 10–52)
ANION GAP SERPL CALC-SCNC: 20 MMOL/L (ref 10–20)
AST SERPL W P-5'-P-CCNC: 27 U/L (ref 9–39)
BASOPHILS # BLD AUTO: 0.11 X10*3/UL (ref 0–0.1)
BASOPHILS NFR BLD AUTO: 1 %
BILIRUB SERPL-MCNC: 0.6 MG/DL (ref 0–1.2)
BUN SERPL-MCNC: 11 MG/DL (ref 6–23)
CALCIUM SERPL-MCNC: 9 MG/DL (ref 8.6–10.6)
CHLORIDE SERPL-SCNC: 104 MMOL/L (ref 98–107)
CO2 SERPL-SCNC: 17 MMOL/L (ref 21–32)
CREAT SERPL-MCNC: 0.72 MG/DL (ref 0.5–1.3)
EGFRCR SERPLBLD CKD-EPI 2021: >90 ML/MIN/1.73M*2
EOSINOPHIL # BLD AUTO: 0.42 X10*3/UL (ref 0–0.7)
EOSINOPHIL NFR BLD AUTO: 3.8 %
ERYTHROCYTE [DISTWIDTH] IN BLOOD BY AUTOMATED COUNT: 13.3 % (ref 11.5–14.5)
GLUCOSE SERPL-MCNC: 90 MG/DL (ref 74–99)
HCT VFR BLD AUTO: 48.2 % (ref 41–52)
HGB BLD-MCNC: 15 G/DL (ref 13.5–17.5)
IMM GRANULOCYTES # BLD AUTO: 0.1 X10*3/UL (ref 0–0.7)
IMM GRANULOCYTES NFR BLD AUTO: 0.9 % (ref 0–0.9)
LAB AP ASR DISCLAIMER: NORMAL
LABORATORY COMMENT REPORT: NORMAL
LDH SERPL L TO P-CCNC: 614 U/L (ref 84–246)
LYMPHOCYTES # BLD AUTO: 3.7 X10*3/UL (ref 1.2–4.8)
LYMPHOCYTES NFR BLD AUTO: 33.5 %
MAGNESIUM SERPL-MCNC: 2.53 MG/DL (ref 1.6–2.4)
MCH RBC QN AUTO: 31.5 PG (ref 26–34)
MCHC RBC AUTO-ENTMCNC: 31.1 G/DL (ref 32–36)
MCV RBC AUTO: 101 FL (ref 80–100)
MONOCYTES # BLD AUTO: 0.65 X10*3/UL (ref 0.1–1)
MONOCYTES NFR BLD AUTO: 5.9 %
NEUTROPHILS # BLD AUTO: 6.07 X10*3/UL (ref 1.2–7.7)
NEUTROPHILS NFR BLD AUTO: 54.9 %
NRBC BLD-RTO: 0 /100 WBCS (ref 0–0)
PATH REPORT.FINAL DX SPEC: NORMAL
PATH REPORT.GROSS SPEC: NORMAL
PATH REPORT.TOTAL CANCER: NORMAL
PLATELET # BLD AUTO: 298 X10*3/UL (ref 150–450)
POTASSIUM SERPL-SCNC: 5 MMOL/L (ref 3.5–5.3)
PROT SERPL-MCNC: 6.9 G/DL (ref 6.4–8.2)
RBC # BLD AUTO: 4.76 X10*6/UL (ref 4.5–5.9)
SODIUM SERPL-SCNC: 136 MMOL/L (ref 136–145)
URATE SERPL-MCNC: 6 MG/DL (ref 4–7.5)
WBC # BLD AUTO: 11.1 X10*3/UL (ref 4.4–11.3)

## 2024-01-11 PROCEDURE — 83615 LACTATE (LD) (LDH) ENZYME: CPT | Performed by: HOSPITALIST

## 2024-01-11 PROCEDURE — 1036F TOBACCO NON-USER: CPT | Performed by: INTERNAL MEDICINE

## 2024-01-11 PROCEDURE — 36415 COLL VENOUS BLD VENIPUNCTURE: CPT | Performed by: HOSPITALIST

## 2024-01-11 PROCEDURE — 82960 TEST FOR G6PD ENZYME: CPT | Performed by: HOSPITALIST

## 2024-01-11 PROCEDURE — 80053 COMPREHEN METABOLIC PANEL: CPT | Performed by: HOSPITALIST

## 2024-01-11 PROCEDURE — 83735 ASSAY OF MAGNESIUM: CPT | Performed by: HOSPITALIST

## 2024-01-11 PROCEDURE — 1170000001 HC PRIVATE ONCOLOGY ROOM DAILY

## 2024-01-11 PROCEDURE — 99213 OFFICE O/P EST LOW 20 MIN: CPT | Mod: 95 | Performed by: INTERNAL MEDICINE

## 2024-01-11 PROCEDURE — 72156 MRI NECK SPINE W/O & W/DYE: CPT

## 2024-01-11 PROCEDURE — 84550 ASSAY OF BLOOD/URIC ACID: CPT | Performed by: HOSPITALIST

## 2024-01-11 PROCEDURE — 72157 MRI CHEST SPINE W/O & W/DYE: CPT

## 2024-01-11 PROCEDURE — 72158 MRI LUMBAR SPINE W/O & W/DYE: CPT

## 2024-01-11 PROCEDURE — 70553 MRI BRAIN STEM W/O & W/DYE: CPT

## 2024-01-11 PROCEDURE — 85025 COMPLETE CBC W/AUTO DIFF WBC: CPT | Performed by: HOSPITALIST

## 2024-01-11 PROCEDURE — 2500000004 HC RX 250 GENERAL PHARMACY W/ HCPCS (ALT 636 FOR OP/ED): Performed by: HOSPITALIST

## 2024-01-11 PROCEDURE — 99213 OFFICE O/P EST LOW 20 MIN: CPT | Performed by: INTERNAL MEDICINE

## 2024-01-11 RX ORDER — MULTIVIT-MIN/IRON FUM/FOLIC AC 7.5 MG-4
1 TABLET ORAL DAILY
Status: DISCONTINUED | OUTPATIENT
Start: 2024-01-11 | End: 2024-01-15 | Stop reason: HOSPADM

## 2024-01-11 RX ORDER — PROCHLORPERAZINE MALEATE 10 MG
10 TABLET ORAL EVERY 6 HOURS PRN
Status: CANCELLED | OUTPATIENT
Start: 2024-01-11

## 2024-01-11 RX ORDER — GABAPENTIN 100 MG/1
100 CAPSULE ORAL NIGHTLY
Status: DISCONTINUED | OUTPATIENT
Start: 2024-01-11 | End: 2024-01-15 | Stop reason: HOSPADM

## 2024-01-11 RX ORDER — FAMOTIDINE 10 MG/ML
20 INJECTION INTRAVENOUS AS NEEDED
Status: CANCELLED | OUTPATIENT
Start: 2024-01-11

## 2024-01-11 RX ORDER — DIPHENHYDRAMINE HYDROCHLORIDE 50 MG/ML
50 INJECTION INTRAMUSCULAR; INTRAVENOUS AS NEEDED
Status: CANCELLED | OUTPATIENT
Start: 2024-01-11

## 2024-01-11 RX ORDER — ENOXAPARIN SODIUM 100 MG/ML
40 INJECTION SUBCUTANEOUS EVERY 24 HOURS
Status: DISCONTINUED | OUTPATIENT
Start: 2024-01-11 | End: 2024-01-15 | Stop reason: HOSPADM

## 2024-01-11 RX ORDER — HEPARIN 100 UNIT/ML
500 SYRINGE INTRAVENOUS AS NEEDED
OUTPATIENT
Start: 2024-01-11

## 2024-01-11 RX ORDER — HEPARIN SODIUM,PORCINE/PF 10 UNIT/ML
50 SYRINGE (ML) INTRAVENOUS AS NEEDED
OUTPATIENT
Start: 2024-01-11

## 2024-01-11 RX ORDER — PROCHLORPERAZINE EDISYLATE 5 MG/ML
10 INJECTION INTRAMUSCULAR; INTRAVENOUS EVERY 6 HOURS PRN
Status: CANCELLED | OUTPATIENT
Start: 2024-01-11

## 2024-01-11 RX ORDER — ROSUVASTATIN CALCIUM 10 MG/1
5 TABLET, COATED ORAL DAILY
Status: DISCONTINUED | OUTPATIENT
Start: 2024-01-11 | End: 2024-01-15 | Stop reason: HOSPADM

## 2024-01-11 RX ORDER — ALBUTEROL SULFATE 0.83 MG/ML
3 SOLUTION RESPIRATORY (INHALATION) AS NEEDED
Status: CANCELLED | OUTPATIENT
Start: 2024-01-11

## 2024-01-11 RX ORDER — EPINEPHRINE 1 MG/ML
0.3 INJECTION INTRAMUSCULAR; INTRAVENOUS; SUBCUTANEOUS EVERY 5 MIN PRN
Status: CANCELLED | OUTPATIENT
Start: 2024-01-11

## 2024-01-11 RX ORDER — OLANZAPINE 5 MG/1
5 TABLET ORAL NIGHTLY
Status: CANCELLED | OUTPATIENT
Start: 2024-01-11

## 2024-01-11 RX ORDER — POLYETHYLENE GLYCOL 3350 17 G/17G
17 POWDER, FOR SOLUTION ORAL DAILY
Status: DISCONTINUED | OUTPATIENT
Start: 2024-01-11 | End: 2024-01-15 | Stop reason: HOSPADM

## 2024-01-11 RX ADMIN — ENOXAPARIN SODIUM 40 MG: 100 INJECTION SUBCUTANEOUS at 20:29

## 2024-01-11 SDOH — SOCIAL STABILITY: SOCIAL INSECURITY: ARE YOU OR HAVE YOU BEEN THREATENED OR ABUSED PHYSICALLY, EMOTIONALLY, OR SEXUALLY BY ANYONE?: NO

## 2024-01-11 SDOH — SOCIAL STABILITY: SOCIAL INSECURITY: ABUSE: ADULT

## 2024-01-11 SDOH — SOCIAL STABILITY: SOCIAL INSECURITY: HAS ANYONE EVER THREATENED TO HURT YOUR FAMILY OR YOUR PETS?: NO

## 2024-01-11 SDOH — SOCIAL STABILITY: SOCIAL INSECURITY: DO YOU FEEL UNSAFE GOING BACK TO THE PLACE WHERE YOU ARE LIVING?: NO

## 2024-01-11 SDOH — SOCIAL STABILITY: SOCIAL INSECURITY: WERE YOU ABLE TO COMPLETE ALL THE BEHAVIORAL HEALTH SCREENINGS?: YES

## 2024-01-11 SDOH — SOCIAL STABILITY: SOCIAL INSECURITY: ARE THERE ANY APPARENT SIGNS OF INJURIES/BEHAVIORS THAT COULD BE RELATED TO ABUSE/NEGLECT?: NO

## 2024-01-11 SDOH — SOCIAL STABILITY: SOCIAL INSECURITY: DO YOU FEEL ANYONE HAS EXPLOITED OR TAKEN ADVANTAGE OF YOU FINANCIALLY OR OF YOUR PERSONAL PROPERTY?: NO

## 2024-01-11 SDOH — SOCIAL STABILITY: SOCIAL INSECURITY: HAVE YOU HAD THOUGHTS OF HARMING ANYONE ELSE?: NO

## 2024-01-11 SDOH — SOCIAL STABILITY: SOCIAL INSECURITY: DOES ANYONE TRY TO KEEP YOU FROM HAVING/CONTACTING OTHER FRIENDS OR DOING THINGS OUTSIDE YOUR HOME?: NO

## 2024-01-11 ASSESSMENT — ACTIVITIES OF DAILY LIVING (ADL)
WALKS IN HOME: NEEDS ASSISTANCE
GROOMING: INDEPENDENT
FEEDING YOURSELF: INDEPENDENT
ADEQUATE_TO_COMPLETE_ADL: YES
HEARING - RIGHT EAR: FUNCTIONAL
TOILETING: INDEPENDENT
JUDGMENT_ADEQUATE_SAFELY_COMPLETE_DAILY_ACTIVITIES: YES
DRESSING YOURSELF: INDEPENDENT
LACK_OF_TRANSPORTATION: NO
BATHING: INDEPENDENT
PATIENT'S MEMORY ADEQUATE TO SAFELY COMPLETE DAILY ACTIVITIES?: YES
HEARING - LEFT EAR: FUNCTIONAL

## 2024-01-11 ASSESSMENT — COGNITIVE AND FUNCTIONAL STATUS - GENERAL
STANDING UP FROM CHAIR USING ARMS: A LITTLE
MOBILITY SCORE: 19
TOILETING: A LITTLE
MOVING TO AND FROM BED TO CHAIR: A LITTLE
WALKING IN HOSPITAL ROOM: A LITTLE
DAILY ACTIVITIY SCORE: 23
TOILETING: A LITTLE
CLIMB 3 TO 5 STEPS WITH RAILING: A LOT
MOVING TO AND FROM BED TO CHAIR: A LITTLE
PATIENT BASELINE BEDBOUND: NO
DAILY ACTIVITIY SCORE: 23
MOBILITY SCORE: 19
CLIMB 3 TO 5 STEPS WITH RAILING: A LOT
WALKING IN HOSPITAL ROOM: A LITTLE
STANDING UP FROM CHAIR USING ARMS: A LITTLE

## 2024-01-11 ASSESSMENT — LIFESTYLE VARIABLES
SKIP TO QUESTIONS 9-10: 1
AUDIT-C TOTAL SCORE: 0
AUDIT-C TOTAL SCORE: 0
HOW MANY STANDARD DRINKS CONTAINING ALCOHOL DO YOU HAVE ON A TYPICAL DAY: PATIENT DOES NOT DRINK
HOW OFTEN DO YOU HAVE A DRINK CONTAINING ALCOHOL: NEVER
HOW OFTEN DO YOU HAVE 6 OR MORE DRINKS ON ONE OCCASION: NEVER

## 2024-01-11 ASSESSMENT — PATIENT HEALTH QUESTIONNAIRE - PHQ9
2. FEELING DOWN, DEPRESSED OR HOPELESS: NOT AT ALL
1. LITTLE INTEREST OR PLEASURE IN DOING THINGS: NOT AT ALL
SUM OF ALL RESPONSES TO PHQ9 QUESTIONS 1 & 2: 0

## 2024-01-11 ASSESSMENT — PAIN SCALES - GENERAL
PAINLEVEL_OUTOF10: 3
PAINLEVEL_OUTOF10: 0 - NO PAIN

## 2024-01-11 ASSESSMENT — PAIN - FUNCTIONAL ASSESSMENT: PAIN_FUNCTIONAL_ASSESSMENT: 0-10

## 2024-01-11 ASSESSMENT — COLUMBIA-SUICIDE SEVERITY RATING SCALE - C-SSRS
2. HAVE YOU ACTUALLY HAD ANY THOUGHTS OF KILLING YOURSELF?: NO
6. HAVE YOU EVER DONE ANYTHING, STARTED TO DO ANYTHING, OR PREPARED TO DO ANYTHING TO END YOUR LIFE?: NO
1. IN THE PAST MONTH, HAVE YOU WISHED YOU WERE DEAD OR WISHED YOU COULD GO TO SLEEP AND NOT WAKE UP?: NO
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
## 2024-01-12 LAB
ALBUMIN SERPL BCP-MCNC: 3.7 G/DL (ref 3.4–5)
ALBUMIN SERPL BCP-MCNC: 3.7 G/DL (ref 3.4–5)
ALBUMIN SERPL BCP-MCNC: 3.9 G/DL (ref 3.4–5)
ALBUMIN SERPL BCP-MCNC: 3.9 G/DL (ref 3.4–5)
ALP SERPL-CCNC: 50 U/L (ref 33–136)
ALT SERPL W P-5'-P-CCNC: 8 U/L (ref 10–52)
ANION GAP SERPL CALC-SCNC: 12 MMOL/L (ref 10–20)
ANION GAP SERPL CALC-SCNC: 12 MMOL/L (ref 10–20)
ANION GAP SERPL CALC-SCNC: 13 MMOL/L (ref 10–20)
ANION GAP SERPL CALC-SCNC: 13 MMOL/L (ref 10–20)
AST SERPL W P-5'-P-CCNC: 17 U/L (ref 9–39)
BASOPHILS # BLD AUTO: 0.06 X10*3/UL (ref 0–0.1)
BASOPHILS NFR BLD AUTO: 0.7 %
BILIRUB SERPL-MCNC: 0.5 MG/DL (ref 0–1.2)
BUN SERPL-MCNC: 11 MG/DL (ref 6–23)
BUN SERPL-MCNC: 13 MG/DL (ref 6–23)
BUN SERPL-MCNC: 9 MG/DL (ref 6–23)
BUN SERPL-MCNC: 9 MG/DL (ref 6–23)
CALCIUM SERPL-MCNC: 9.5 MG/DL (ref 8.6–10.6)
CHLORIDE SERPL-SCNC: 103 MMOL/L (ref 98–107)
CO2 SERPL-SCNC: 27 MMOL/L (ref 21–32)
CREAT SERPL-MCNC: 0.87 MG/DL (ref 0.5–1.3)
CREAT SERPL-MCNC: 0.89 MG/DL (ref 0.5–1.3)
CREAT SERPL-MCNC: 0.89 MG/DL (ref 0.5–1.3)
CREAT SERPL-MCNC: 0.91 MG/DL (ref 0.5–1.3)
EGFRCR SERPLBLD CKD-EPI 2021: >90 ML/MIN/1.73M*2
EOSINOPHIL # BLD AUTO: 0.34 X10*3/UL (ref 0–0.7)
EOSINOPHIL NFR BLD AUTO: 4 %
ERYTHROCYTE [DISTWIDTH] IN BLOOD BY AUTOMATED COUNT: 13.4 % (ref 11.5–14.5)
G6PD RBC QL: NORMAL
GLUCOSE SERPL-MCNC: 68 MG/DL (ref 74–99)
GLUCOSE SERPL-MCNC: 78 MG/DL (ref 74–99)
GLUCOSE SERPL-MCNC: 78 MG/DL (ref 74–99)
GLUCOSE SERPL-MCNC: 86 MG/DL (ref 74–99)
HCT VFR BLD AUTO: 43.8 % (ref 41–52)
HGB BLD-MCNC: 14.1 G/DL (ref 13.5–17.5)
IMM GRANULOCYTES # BLD AUTO: 0.08 X10*3/UL (ref 0–0.7)
IMM GRANULOCYTES NFR BLD AUTO: 0.9 % (ref 0–0.9)
LDH SERPL L TO P-CCNC: 325 U/L (ref 84–246)
LDH SERPL L TO P-CCNC: 345 U/L (ref 84–246)
LDH SERPL L TO P-CCNC: 359 U/L (ref 84–246)
LYMPHOCYTES # BLD AUTO: 2.31 X10*3/UL (ref 1.2–4.8)
LYMPHOCYTES NFR BLD AUTO: 27.1 %
MAGNESIUM SERPL-MCNC: 2.14 MG/DL (ref 1.6–2.4)
MCH RBC QN AUTO: 30.2 PG (ref 26–34)
MCHC RBC AUTO-ENTMCNC: 32.2 G/DL (ref 32–36)
MCV RBC AUTO: 94 FL (ref 80–100)
MONOCYTES # BLD AUTO: 0.57 X10*3/UL (ref 0.1–1)
MONOCYTES NFR BLD AUTO: 6.7 %
NEUTROPHILS # BLD AUTO: 5.17 X10*3/UL (ref 1.2–7.7)
NEUTROPHILS NFR BLD AUTO: 60.6 %
NRBC BLD-RTO: 0 /100 WBCS (ref 0–0)
PHOSPHATE SERPL-MCNC: 3.7 MG/DL (ref 2.5–4.9)
PHOSPHATE SERPL-MCNC: 3.8 MG/DL (ref 2.5–4.9)
PHOSPHATE SERPL-MCNC: 4.1 MG/DL (ref 2.5–4.9)
PLATELET # BLD AUTO: 440 X10*3/UL (ref 150–450)
POTASSIUM SERPL-SCNC: 4 MMOL/L (ref 3.5–5.3)
POTASSIUM SERPL-SCNC: 4.2 MMOL/L (ref 3.5–5.3)
PROT SERPL-MCNC: 6.8 G/DL (ref 6.4–8.2)
RBC # BLD AUTO: 4.67 X10*6/UL (ref 4.5–5.9)
SODIUM SERPL-SCNC: 138 MMOL/L (ref 136–145)
SODIUM SERPL-SCNC: 138 MMOL/L (ref 136–145)
SODIUM SERPL-SCNC: 139 MMOL/L (ref 136–145)
SODIUM SERPL-SCNC: 139 MMOL/L (ref 136–145)
URATE SERPL-MCNC: 5.5 MG/DL (ref 4–7.5)
URATE SERPL-MCNC: 5.9 MG/DL (ref 4–7.5)
URATE SERPL-MCNC: 6.2 MG/DL (ref 4–7.5)
WBC # BLD AUTO: 8.5 X10*3/UL (ref 4.4–11.3)

## 2024-01-12 PROCEDURE — 83615 LACTATE (LD) (LDH) ENZYME: CPT | Performed by: HOSPITALIST

## 2024-01-12 PROCEDURE — 36415 COLL VENOUS BLD VENIPUNCTURE: CPT | Performed by: HOSPITALIST

## 2024-01-12 PROCEDURE — 2500000002 HC RX 250 W HCPCS SELF ADMINISTERED DRUGS (ALT 637 FOR MEDICARE OP, ALT 636 FOR OP/ED)

## 2024-01-12 PROCEDURE — A9575 INJ GADOTERATE MEGLUMI 0.1ML: HCPCS | Performed by: HOSPITALIST

## 2024-01-12 PROCEDURE — 72156 MRI NECK SPINE W/O & W/DYE: CPT | Performed by: RADIOLOGY

## 2024-01-12 PROCEDURE — 84550 ASSAY OF BLOOD/URIC ACID: CPT | Performed by: HOSPITALIST

## 2024-01-12 PROCEDURE — 72157 MRI CHEST SPINE W/O & W/DYE: CPT | Performed by: RADIOLOGY

## 2024-01-12 PROCEDURE — 85025 COMPLETE CBC W/AUTO DIFF WBC: CPT | Performed by: HOSPITALIST

## 2024-01-12 PROCEDURE — 99233 SBSQ HOSP IP/OBS HIGH 50: CPT

## 2024-01-12 PROCEDURE — 80053 COMPREHEN METABOLIC PANEL: CPT | Performed by: HOSPITALIST

## 2024-01-12 PROCEDURE — 80069 RENAL FUNCTION PANEL: CPT | Mod: CCI | Performed by: HOSPITALIST

## 2024-01-12 PROCEDURE — 2500000004 HC RX 250 GENERAL PHARMACY W/ HCPCS (ALT 636 FOR OP/ED)

## 2024-01-12 PROCEDURE — 70553 MRI BRAIN STEM W/O & W/DYE: CPT | Performed by: RADIOLOGY

## 2024-01-12 PROCEDURE — 72158 MRI LUMBAR SPINE W/O & W/DYE: CPT | Performed by: RADIOLOGY

## 2024-01-12 PROCEDURE — 84100 ASSAY OF PHOSPHORUS: CPT | Performed by: HOSPITALIST

## 2024-01-12 PROCEDURE — 2550000001 HC RX 255 CONTRASTS: Performed by: HOSPITALIST

## 2024-01-12 PROCEDURE — 2500000004 HC RX 250 GENERAL PHARMACY W/ HCPCS (ALT 636 FOR OP/ED): Performed by: HOSPITALIST

## 2024-01-12 PROCEDURE — 2500000001 HC RX 250 WO HCPCS SELF ADMINISTERED DRUGS (ALT 637 FOR MEDICARE OP): Performed by: HOSPITALIST

## 2024-01-12 PROCEDURE — 1170000001 HC PRIVATE ONCOLOGY ROOM DAILY

## 2024-01-12 PROCEDURE — 94640 AIRWAY INHALATION TREATMENT: CPT

## 2024-01-12 PROCEDURE — 83735 ASSAY OF MAGNESIUM: CPT | Performed by: HOSPITALIST

## 2024-01-12 PROCEDURE — 3E03305 INTRODUCTION OF OTHER ANTINEOPLASTIC INTO PERIPHERAL VEIN, PERCUTANEOUS APPROACH: ICD-10-PCS | Performed by: INTERNAL MEDICINE

## 2024-01-12 PROCEDURE — 2500000001 HC RX 250 WO HCPCS SELF ADMINISTERED DRUGS (ALT 637 FOR MEDICARE OP)

## 2024-01-12 PROCEDURE — 2500000004 HC RX 250 GENERAL PHARMACY W/ HCPCS (ALT 636 FOR OP/ED): Performed by: INTERNAL MEDICINE

## 2024-01-12 RX ORDER — PROCHLORPERAZINE EDISYLATE 5 MG/ML
10 INJECTION INTRAMUSCULAR; INTRAVENOUS EVERY 6 HOURS PRN
Status: DISCONTINUED | OUTPATIENT
Start: 2024-01-12 | End: 2024-01-15 | Stop reason: HOSPADM

## 2024-01-12 RX ORDER — PROCHLORPERAZINE MALEATE 10 MG
10 TABLET ORAL EVERY 6 HOURS PRN
Status: DISCONTINUED | OUTPATIENT
Start: 2024-01-12 | End: 2024-01-15 | Stop reason: HOSPADM

## 2024-01-12 RX ORDER — EPINEPHRINE 1 MG/ML
0.3 INJECTION, SOLUTION, CONCENTRATE INTRAVENOUS EVERY 5 MIN PRN
Status: DISCONTINUED | OUTPATIENT
Start: 2024-01-12 | End: 2024-01-15 | Stop reason: HOSPADM

## 2024-01-12 RX ORDER — DIPHENHYDRAMINE HYDROCHLORIDE 50 MG/ML
50 INJECTION INTRAMUSCULAR; INTRAVENOUS AS NEEDED
Status: DISCONTINUED | OUTPATIENT
Start: 2024-01-12 | End: 2024-01-15 | Stop reason: HOSPADM

## 2024-01-12 RX ORDER — OLANZAPINE 5 MG/1
5 TABLET ORAL NIGHTLY
Status: DISCONTINUED | OUTPATIENT
Start: 2024-01-12 | End: 2024-01-15 | Stop reason: HOSPADM

## 2024-01-12 RX ORDER — ALBUTEROL SULFATE 0.83 MG/ML
1.25 SOLUTION RESPIRATORY (INHALATION) ONCE
Status: COMPLETED | OUTPATIENT
Start: 2024-01-12 | End: 2024-01-12

## 2024-01-12 RX ORDER — ALPRAZOLAM 0.25 MG/1
0.25 TABLET ORAL 3 TIMES DAILY PRN
COMMUNITY
End: 2024-03-14 | Stop reason: HOSPADM

## 2024-01-12 RX ORDER — GUAIFENESIN 600 MG/1
600 TABLET, EXTENDED RELEASE ORAL 2 TIMES DAILY PRN
Status: DISCONTINUED | OUTPATIENT
Start: 2024-01-12 | End: 2024-01-15 | Stop reason: HOSPADM

## 2024-01-12 RX ORDER — GADOTERATE MEGLUMINE 376.9 MG/ML
19 INJECTION INTRAVENOUS
Status: COMPLETED | OUTPATIENT
Start: 2024-01-12 | End: 2024-01-12

## 2024-01-12 RX ORDER — SODIUM CHLORIDE 9 MG/ML
100 INJECTION, SOLUTION INTRAVENOUS CONTINUOUS
Status: DISCONTINUED | OUTPATIENT
Start: 2024-01-12 | End: 2024-01-15 | Stop reason: HOSPADM

## 2024-01-12 RX ORDER — ALLOPURINOL 300 MG/1
300 TABLET ORAL DAILY
Status: DISCONTINUED | OUTPATIENT
Start: 2024-01-12 | End: 2024-01-15 | Stop reason: HOSPADM

## 2024-01-12 RX ORDER — FAMOTIDINE 10 MG/ML
20 INJECTION INTRAVENOUS AS NEEDED
Status: DISCONTINUED | OUTPATIENT
Start: 2024-01-12 | End: 2024-01-15 | Stop reason: HOSPADM

## 2024-01-12 RX ORDER — ALBUTEROL SULFATE 0.83 MG/ML
3 SOLUTION RESPIRATORY (INHALATION) AS NEEDED
Status: DISCONTINUED | OUTPATIENT
Start: 2024-01-12 | End: 2024-01-15 | Stop reason: HOSPADM

## 2024-01-12 RX ORDER — ALPRAZOLAM 0.25 MG/1
0.25 TABLET ORAL 3 TIMES DAILY PRN
Status: DISCONTINUED | OUTPATIENT
Start: 2024-01-12 | End: 2024-01-15 | Stop reason: HOSPADM

## 2024-01-12 RX ADMIN — GADOTERATE MEGLUMINE 19 ML: 376.9 INJECTION INTRAVENOUS at 00:10

## 2024-01-12 RX ADMIN — CARBOPLATIN 616 MG: 10 INJECTION, SOLUTION INTRAVENOUS at 22:35

## 2024-01-12 RX ADMIN — Medication 1 TABLET: at 10:23

## 2024-01-12 RX ADMIN — SODIUM CHLORIDE 100 ML/HR: 9 INJECTION, SOLUTION INTRAVENOUS at 13:21

## 2024-01-12 RX ADMIN — DEXAMETHASONE SODIUM PHOSPHATE 12 MG: 10 INJECTION, SOLUTION INTRAMUSCULAR; INTRAVENOUS at 20:30

## 2024-01-12 RX ADMIN — SODIUM CHLORIDE 218 MG: 0.9 INJECTION, SOLUTION INTRAVENOUS at 23:21

## 2024-01-12 RX ADMIN — OLANZAPINE 5 MG: 5 TABLET, FILM COATED ORAL at 20:32

## 2024-01-12 RX ADMIN — GABAPENTIN 100 MG: 100 CAPSULE ORAL at 20:32

## 2024-01-12 RX ADMIN — ENOXAPARIN SODIUM 40 MG: 100 INJECTION SUBCUTANEOUS at 20:32

## 2024-01-12 RX ADMIN — POLYETHYLENE GLYCOL 3350 17 G: 17 POWDER, FOR SOLUTION ORAL at 10:23

## 2024-01-12 RX ADMIN — ONDANSETRON 16 MG: 2 INJECTION INTRAMUSCULAR; INTRAVENOUS at 20:49

## 2024-01-12 RX ADMIN — FOSAPREPITANT 150 MG: 150 INJECTION, POWDER, LYOPHILIZED, FOR SOLUTION INTRAVENOUS at 21:06

## 2024-01-12 RX ADMIN — ALLOPURINOL 300 MG: 300 TABLET ORAL at 13:21

## 2024-01-12 RX ADMIN — ALBUTEROL SULFATE 1.25 MG: 2.5 SOLUTION RESPIRATORY (INHALATION) at 22:05

## 2024-01-12 ASSESSMENT — COGNITIVE AND FUNCTIONAL STATUS - GENERAL
CLIMB 3 TO 5 STEPS WITH RAILING: A LOT
DAILY ACTIVITIY SCORE: 22
PERSONAL GROOMING: A LITTLE
MOBILITY SCORE: 19
HELP NEEDED FOR BATHING: A LITTLE
STANDING UP FROM CHAIR USING ARMS: A LITTLE
MOVING TO AND FROM BED TO CHAIR: A LITTLE
WALKING IN HOSPITAL ROOM: A LITTLE
CLIMB 3 TO 5 STEPS WITH RAILING: A LOT
STANDING UP FROM CHAIR USING ARMS: A LITTLE
MOVING TO AND FROM BED TO CHAIR: A LITTLE
HELP NEEDED FOR BATHING: A LITTLE
TOILETING: A LITTLE
MOBILITY SCORE: 18
DAILY ACTIVITIY SCORE: 21
TOILETING: A LITTLE
WALKING IN HOSPITAL ROOM: A LOT

## 2024-01-12 ASSESSMENT — PAIN SCALES - GENERAL
PAINLEVEL_OUTOF10: 0 - NO PAIN
PAINLEVEL_OUTOF10: 0 - NO PAIN

## 2024-01-12 ASSESSMENT — PAIN - FUNCTIONAL ASSESSMENT
PAIN_FUNCTIONAL_ASSESSMENT: 0-10
PAIN_FUNCTIONAL_ASSESSMENT: 0-10

## 2024-01-12 NOTE — PROGRESS NOTES
"Mike Franco is a 63 y.o. male on day 1 of admission presenting with Lung cancer metastatic to bone (CMS/HCC).    Subjective   No acute events overnight. Mr. Franco reports feeling well, denies any fever/chills, N/V, chest pain, abdominal pain, dysuria, or diarrhea. Endorses mild cough, which has been present over past several months. Reports phantom limb pain is at baseline.        Objective     Physical Exam  Constitutional:       General: He is not in acute distress.     Appearance: Normal appearance.   HENT:      Head: Normocephalic and atraumatic.      Nose: Nose normal.      Mouth/Throat:      Mouth: Mucous membranes are moist.      Pharynx: Oropharynx is clear. No posterior oropharyngeal erythema.   Eyes:      General: No scleral icterus.     Extraocular Movements: Extraocular movements intact.      Conjunctiva/sclera: Conjunctivae normal.      Pupils: Pupils are equal, round, and reactive to light.   Cardiovascular:      Rate and Rhythm: Normal rate and regular rhythm.      Pulses: Normal pulses.      Heart sounds: Normal heart sounds.   Pulmonary:      Effort: Pulmonary effort is normal. No respiratory distress.      Breath sounds: Normal breath sounds.   Abdominal:      General: Abdomen is flat. There is no distension.      Palpations: Abdomen is soft.      Tenderness: There is no abdominal tenderness.   Musculoskeletal:         General: Normal range of motion.      Cervical back: Normal range of motion and neck supple.      Comments: Left AKA    Skin:     General: Skin is warm and dry.   Neurological:      General: No focal deficit present.      Mental Status: He is alert and oriented to person, place, and time.   Psychiatric:         Mood and Affect: Mood normal.         Behavior: Behavior normal.       Last Recorded Vitals  Blood pressure 103/67, pulse 77, temperature 37.2 °C (99 °F), temperature source Temporal, resp. rate 16, height 1.801 m (5' 10.91\"), weight 85.3 kg (188 lb 0.8 oz), SpO2 93 " %.  Intake/Output last 3 Shifts:  I/O last 3 completed shifts:  In: - (0 mL/kg)   Out: 500 (5.9 mL/kg) [Urine:500 (0.2 mL/kg/hr)]  Weight: 85.3 kg     Relevant Results  Results for orders placed or performed during the hospital encounter of 01/11/24 (from the past 24 hour(s))   Comprehensive metabolic panel   Result Value Ref Range    Glucose 90 74 - 99 mg/dL    Sodium 136 136 - 145 mmol/L    Potassium 5.0 3.5 - 5.3 mmol/L    Chloride 104 98 - 107 mmol/L    Bicarbonate 17 (L) 21 - 32 mmol/L    Anion Gap 20 10 - 20 mmol/L    Urea Nitrogen 11 6 - 23 mg/dL    Creatinine 0.72 0.50 - 1.30 mg/dL    eGFR >90 >60 mL/min/1.73m*2    Calcium 9.0 8.6 - 10.6 mg/dL    Albumin 3.8 3.4 - 5.0 g/dL    Alkaline Phosphatase 50 33 - 136 U/L    Total Protein 6.9 6.4 - 8.2 g/dL    AST 27 9 - 39 U/L    Bilirubin, Total 0.6 0.0 - 1.2 mg/dL    ALT 8 (L) 10 - 52 U/L   CBC and Auto Differential   Result Value Ref Range    WBC 11.1 4.4 - 11.3 x10*3/uL    nRBC 0.0 0.0 - 0.0 /100 WBCs    RBC 4.76 4.50 - 5.90 x10*6/uL    Hemoglobin 15.0 13.5 - 17.5 g/dL    Hematocrit 48.2 41.0 - 52.0 %     (H) 80 - 100 fL    MCH 31.5 26.0 - 34.0 pg    MCHC 31.1 (L) 32.0 - 36.0 g/dL    RDW 13.3 11.5 - 14.5 %    Platelets 298 150 - 450 x10*3/uL    Neutrophils % 54.9 40.0 - 80.0 %    Immature Granulocytes %, Automated 0.9 0.0 - 0.9 %    Lymphocytes % 33.5 13.0 - 44.0 %    Monocytes % 5.9 2.0 - 10.0 %    Eosinophils % 3.8 0.0 - 6.0 %    Basophils % 1.0 0.0 - 2.0 %    Neutrophils Absolute 6.07 1.20 - 7.70 x10*3/uL    Immature Granulocytes Absolute, Automated 0.10 0.00 - 0.70 x10*3/uL    Lymphocytes Absolute 3.70 1.20 - 4.80 x10*3/uL    Monocytes Absolute 0.65 0.10 - 1.00 x10*3/uL    Eosinophils Absolute 0.42 0.00 - 0.70 x10*3/uL    Basophils Absolute 0.11 (H) 0.00 - 0.10 x10*3/uL   Magnesium   Result Value Ref Range    Magnesium 2.53 (H) 1.60 - 2.40 mg/dL   Glucose 6 phosphate dehydrogenase   Result Value Ref Range    G6PD, Qual Normal Normal   Lactate  Dehydrogenase   Result Value Ref Range     (H) 84 - 246 U/L   Uric Acid   Result Value Ref Range    Uric Acid 6.0 4.0 - 7.5 mg/dL   CBC and Auto Differential   Result Value Ref Range    WBC 8.5 4.4 - 11.3 x10*3/uL    nRBC 0.0 0.0 - 0.0 /100 WBCs    RBC 4.67 4.50 - 5.90 x10*6/uL    Hemoglobin 14.1 13.5 - 17.5 g/dL    Hematocrit 43.8 41.0 - 52.0 %    MCV 94 80 - 100 fL    MCH 30.2 26.0 - 34.0 pg    MCHC 32.2 32.0 - 36.0 g/dL    RDW 13.4 11.5 - 14.5 %    Platelets 440 150 - 450 x10*3/uL    Neutrophils % 60.6 40.0 - 80.0 %    Immature Granulocytes %, Automated 0.9 0.0 - 0.9 %    Lymphocytes % 27.1 13.0 - 44.0 %    Monocytes % 6.7 2.0 - 10.0 %    Eosinophils % 4.0 0.0 - 6.0 %    Basophils % 0.7 0.0 - 2.0 %    Neutrophils Absolute 5.17 1.20 - 7.70 x10*3/uL    Immature Granulocytes Absolute, Automated 0.08 0.00 - 0.70 x10*3/uL    Lymphocytes Absolute 2.31 1.20 - 4.80 x10*3/uL    Monocytes Absolute 0.57 0.10 - 1.00 x10*3/uL    Eosinophils Absolute 0.34 0.00 - 0.70 x10*3/uL    Basophils Absolute 0.06 0.00 - 0.10 x10*3/uL   Comprehensive Metabolic Panel   Result Value Ref Range    Glucose 78 74 - 99 mg/dL    Sodium 138 136 - 145 mmol/L    Potassium 4.2 3.5 - 5.3 mmol/L    Chloride 103 98 - 107 mmol/L    Bicarbonate 27 21 - 32 mmol/L    Anion Gap 12 10 - 20 mmol/L    Urea Nitrogen 9 6 - 23 mg/dL    Creatinine 0.89 0.50 - 1.30 mg/dL    eGFR >90 >60 mL/min/1.73m*2    Calcium 9.5 8.6 - 10.6 mg/dL    Albumin 3.9 3.4 - 5.0 g/dL    Alkaline Phosphatase 50 33 - 136 U/L    Total Protein 6.8 6.4 - 8.2 g/dL    AST 17 9 - 39 U/L    Bilirubin, Total 0.5 0.0 - 1.2 mg/dL    ALT 8 (L) 10 - 52 U/L   Magnesium   Result Value Ref Range    Magnesium 2.14 1.60 - 2.40 mg/dL   Renal Function Panel   Result Value Ref Range    Glucose 78 74 - 99 mg/dL    Sodium 138 136 - 145 mmol/L    Potassium 4.2 3.5 - 5.3 mmol/L    Chloride 103 98 - 107 mmol/L    Bicarbonate 27 21 - 32 mmol/L    Anion Gap 12 10 - 20 mmol/L    Urea Nitrogen 9 6 - 23 mg/dL     Creatinine 0.89 0.50 - 1.30 mg/dL    eGFR >90 >60 mL/min/1.73m*2    Calcium 9.5 8.6 - 10.6 mg/dL    Phosphorus 3.8 2.5 - 4.9 mg/dL    Albumin 3.9 3.4 - 5.0 g/dL   Uric Acid   Result Value Ref Range    Uric Acid 5.9 4.0 - 7.5 mg/dL   Lactate Dehydrogenase   Result Value Ref Range     (H) 84 - 246 U/L   Renal Function Panel   Result Value Ref Range    Glucose 86 74 - 99 mg/dL    Sodium 139 136 - 145 mmol/L    Potassium 4.2 3.5 - 5.3 mmol/L    Chloride 103 98 - 107 mmol/L    Bicarbonate 27 21 - 32 mmol/L    Anion Gap 13 10 - 20 mmol/L    Urea Nitrogen 11 6 - 23 mg/dL    Creatinine 0.91 0.50 - 1.30 mg/dL    eGFR >90 >60 mL/min/1.73m*2    Calcium 9.5 8.6 - 10.6 mg/dL    Phosphorus 3.7 2.5 - 4.9 mg/dL    Albumin 3.7 3.4 - 5.0 g/dL   Uric Acid   Result Value Ref Range    Uric Acid 6.2 4.0 - 7.5 mg/dL   Lactate Dehydrogenase   Result Value Ref Range     (H) 84 - 246 U/L     Imaging:   MR BRAIN W AND WO IV CONTRAST;  1/12/2024 12:08 am   IMPRESSION:  No evidence of parenchymal metastatic disease.      MR CERVICAL SPINE W AND WO IV CONTRAST; MR THORACIC SPINE W AND WO IV  CONTRAST; MR LUMBAR SPINE W AND WO IV CONTRAST;  1/12/2024 12:08 am  IMPRESSION:  Multifocal osseous metastatic disease. There is minimal epidural  extension of neoplasm at T1 without significant central canal  stenosis. Minimal epidural extension of neoplasm at L1 can not be  excluded.      Multifocal degenerative changes of the cervical, thoracic and lumbar  spine as detailed.           Assessment/Plan   Principal Problem:    Lung cancer metastatic to bone (CMS/HCC)    Mr. Franco is a 64 yo M with PMHx of hyperlipidemia, phantom limb pain 2/2 left AKA (2003), and newly diagnosed stage IVB small cell lung cancer of YOHANNES (mets to spine, mediastinal and supraclavicular Lns, follows with Dr. Sandhu) admitted for initiation of carboplatin/etoposide therapy.    Orders placed by Dr. Sandhu to start chemo today (1/12), patient has PRNs for  nausea/infusion reaction. Will start IV maintenance fluids and allopurinol for TLS ppx and monitor with q8h TLS labs.       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 Sandhu. Will plan to start today  -Baseline uric acid 6, ; G6PD WNL  -Allopurinol + IV maintenance fluids (100 mL/hr) for TLS ppx  -q8h TLS labs for monitoring  -MR brain obtained for complete staging, punctate nodule noted along right internal auditory canal (possible small schwannoma vs metastatic focus)     #HLD  - continue home rosuvastatin 5mg      #Phantom limb pain s/p L AKA  - continue home gabapentin 100mg     F: PRN  E: Mg>2 k>3.5  N: regular diet  DVT PPX: lovenox  GI PPX: not indicated     Full code  NOK: Ellie Hamilton ( S/O) : [phone]/ [phone]                 Yasmin Esparza MD

## 2024-01-12 NOTE — TREATMENT PLAN
Use creatinine obtained in the last 24 hours - 0.72 on 01/11 at 20:23 for carboplatin calculation
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
## 2024-01-12 NOTE — SIGNIFICANT EVENT
01/12/24 1525   Prechemo Checklist   Has the patient been in the hospital, ED, or urgent care since last date of service N/A   Chemo/Immuno Consent Signed Yes   Protocol/Indications Verified Yes   Confirmed to previous date/time of medication N/A   Compared to previous dose N/A   All medications are dated accurately Yes   Pregnancy Test Negative Not applicable   Parameters Met Yes   BSA/Weight-Height Verified Yes   Dose Calculations Verified Yes

## 2024-01-12 NOTE — PROGRESS NOTES
Pharmacy Medication History Review    Mike Franco is a 63 y.o. male admitted for Lung cancer metastatic to bone (CMS/[facility]). Pharmacy reviewed the patient's [id number] medications and allergies for accuracy.    The list below reflects the updated PTA list. Comments regarding how patient may be taking medications differently can be found in the Admit Orders Activity  Prior to Admission Medications   Prescriptions Last Dose Informant Patient Reported?   ALPRAZolam (Xanax) 0.25 mg tablet  Self Yes   Sig: Take 1 tablet (0.25 mg) by mouth 3 times a day as needed for anxiety.   EPINEPHrine 0.3 mg/0.3 mL injection syringe  Self Yes   Sig: Inject 0.3 mL (0.3 mg) into the muscle. As Directed   amoxicillin (Amoxil) 500 mg capsule  Self No   Sig: Take 4 capsules (2,000 mg) by mouth if needed (SEE SIG.). 30 MINUTES TO AN HOUR PRIOR TO DENTAL PROCEDURE   dextromethorphan-guaifenesin (Mucinex DM)  mg 12 hr tablet  Self Yes   Sig: Take 1 tablet by mouth every 12 hours. Do not crush, chew, or split.   gabapentin (Neurontin) 100 mg capsule  Self Yes   Sig: Take 1 capsule (100 mg) by mouth as needed at bedtime (phantom limb pain).   multivitamin tablet  Self Yes   Sig: Take 1 tablet by mouth once daily.   rosuvastatin (Crestor) 5 mg tablet  Self Yes   Sig: Take 1 tablet (5 mg) by mouth once daily.      Facility-Administered Medications: None        The list below reflects the updated allergy list. Please review each documented allergy for additional clarification and justification.  Allergies  Reviewed by Tammi Hernandez RP on 1/12/2024   No Known Allergies         Patient accepts M2B at discharge. Pharmacy has been updated to [facility].    Sources used to confirm home medication list include: Patient interview, OARRS, Care Everywhere, medication fill history, Progress Notes 12/21/23 and 1/3/24.    Below are additional concerns with the patient's PTA list.  None to note.    Tammi Hernandez [facility]    Transitions of Care Pharmacist  [facility]  Please reach out via Secure Chat for questions, or if no response call [facility] or [facility]
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
## 2024-01-13 LAB
ALBUMIN SERPL BCP-MCNC: 3.7 G/DL (ref 3.4–5)
ALBUMIN SERPL BCP-MCNC: 3.7 G/DL (ref 3.4–5)
ALBUMIN SERPL BCP-MCNC: 3.8 G/DL (ref 3.4–5)
ALP SERPL-CCNC: 48 U/L (ref 33–136)
ALT SERPL W P-5'-P-CCNC: 11 U/L (ref 10–52)
ANION GAP SERPL CALC-SCNC: 12 MMOL/L (ref 10–20)
ANION GAP SERPL CALC-SCNC: 12 MMOL/L (ref 10–20)
ANION GAP SERPL CALC-SCNC: 13 MMOL/L (ref 10–20)
AST SERPL W P-5'-P-CCNC: 13 U/L (ref 9–39)
BASOPHILS # BLD AUTO: 0.02 X10*3/UL (ref 0–0.1)
BASOPHILS NFR BLD AUTO: 0.2 %
BILIRUB SERPL-MCNC: 0.5 MG/DL (ref 0–1.2)
BUN SERPL-MCNC: 11 MG/DL (ref 6–23)
BUN SERPL-MCNC: 11 MG/DL (ref 6–23)
BUN SERPL-MCNC: 13 MG/DL (ref 6–23)
CALCIUM SERPL-MCNC: 9.2 MG/DL (ref 8.6–10.6)
CHLORIDE SERPL-SCNC: 107 MMOL/L (ref 98–107)
CO2 SERPL-SCNC: 24 MMOL/L (ref 21–32)
CO2 SERPL-SCNC: 26 MMOL/L (ref 21–32)
CO2 SERPL-SCNC: 26 MMOL/L (ref 21–32)
CREAT SERPL-MCNC: 0.72 MG/DL (ref 0.5–1.3)
CREAT SERPL-MCNC: 0.78 MG/DL (ref 0.5–1.3)
CREAT SERPL-MCNC: 0.78 MG/DL (ref 0.5–1.3)
EGFRCR SERPLBLD CKD-EPI 2021: >90 ML/MIN/1.73M*2
EOSINOPHIL # BLD AUTO: 0.01 X10*3/UL (ref 0–0.7)
EOSINOPHIL NFR BLD AUTO: 0.1 %
ERYTHROCYTE [DISTWIDTH] IN BLOOD BY AUTOMATED COUNT: 13.5 % (ref 11.5–14.5)
GLUCOSE SERPL-MCNC: 133 MG/DL (ref 74–99)
GLUCOSE SERPL-MCNC: 136 MG/DL (ref 74–99)
GLUCOSE SERPL-MCNC: 136 MG/DL (ref 74–99)
HCT VFR BLD AUTO: 43.6 % (ref 41–52)
HGB BLD-MCNC: 14 G/DL (ref 13.5–17.5)
IMM GRANULOCYTES # BLD AUTO: 0.06 X10*3/UL (ref 0–0.7)
IMM GRANULOCYTES NFR BLD AUTO: 0.7 % (ref 0–0.9)
LDH SERPL L TO P-CCNC: 297 U/L (ref 84–246)
LDH SERPL L TO P-CCNC: 322 U/L (ref 84–246)
LDH SERPL L TO P-CCNC: 326 U/L (ref 84–246)
LYMPHOCYTES # BLD AUTO: 0.8 X10*3/UL (ref 1.2–4.8)
LYMPHOCYTES NFR BLD AUTO: 8.8 %
MAGNESIUM SERPL-MCNC: 1.96 MG/DL (ref 1.6–2.4)
MCH RBC QN AUTO: 30.6 PG (ref 26–34)
MCHC RBC AUTO-ENTMCNC: 32.1 G/DL (ref 32–36)
MCV RBC AUTO: 95 FL (ref 80–100)
MONOCYTES # BLD AUTO: 0.07 X10*3/UL (ref 0.1–1)
MONOCYTES NFR BLD AUTO: 0.8 %
NEUTROPHILS # BLD AUTO: 8.09 X10*3/UL (ref 1.2–7.7)
NEUTROPHILS NFR BLD AUTO: 89.4 %
NRBC BLD-RTO: 0 /100 WBCS (ref 0–0)
PHOSPHATE SERPL-MCNC: 2.7 MG/DL (ref 2.5–4.9)
PHOSPHATE SERPL-MCNC: 3.1 MG/DL (ref 2.5–4.9)
PHOSPHATE SERPL-MCNC: 3.1 MG/DL (ref 2.5–4.9)
PLATELET # BLD AUTO: 377 X10*3/UL (ref 150–450)
POTASSIUM SERPL-SCNC: 4.2 MMOL/L (ref 3.5–5.3)
POTASSIUM SERPL-SCNC: 4.3 MMOL/L (ref 3.5–5.3)
POTASSIUM SERPL-SCNC: 4.3 MMOL/L (ref 3.5–5.3)
PROT SERPL-MCNC: 6.5 G/DL (ref 6.4–8.2)
RBC # BLD AUTO: 4.57 X10*6/UL (ref 4.5–5.9)
SODIUM SERPL-SCNC: 140 MMOL/L (ref 136–145)
SODIUM SERPL-SCNC: 141 MMOL/L (ref 136–145)
SODIUM SERPL-SCNC: 141 MMOL/L (ref 136–145)
URATE SERPL-MCNC: 4.2 MG/DL (ref 4–7.5)
URATE SERPL-MCNC: 4.6 MG/DL (ref 4–7.5)
URATE SERPL-MCNC: 5 MG/DL (ref 4–7.5)
WBC # BLD AUTO: 9.1 X10*3/UL (ref 4.4–11.3)

## 2024-01-13 PROCEDURE — 80053 COMPREHEN METABOLIC PANEL: CPT | Performed by: HOSPITALIST

## 2024-01-13 PROCEDURE — 36415 COLL VENOUS BLD VENIPUNCTURE: CPT | Performed by: HOSPITALIST

## 2024-01-13 PROCEDURE — 84100 ASSAY OF PHOSPHORUS: CPT | Performed by: HOSPITALIST

## 2024-01-13 PROCEDURE — 85025 COMPLETE CBC W/AUTO DIFF WBC: CPT | Performed by: HOSPITALIST

## 2024-01-13 PROCEDURE — 83615 LACTATE (LD) (LDH) ENZYME: CPT | Performed by: HOSPITALIST

## 2024-01-13 PROCEDURE — 2500000004 HC RX 250 GENERAL PHARMACY W/ HCPCS (ALT 636 FOR OP/ED): Performed by: HOSPITALIST

## 2024-01-13 PROCEDURE — 2500000004 HC RX 250 GENERAL PHARMACY W/ HCPCS (ALT 636 FOR OP/ED)

## 2024-01-13 PROCEDURE — 80069 RENAL FUNCTION PANEL: CPT | Mod: CCI | Performed by: HOSPITALIST

## 2024-01-13 PROCEDURE — 2500000001 HC RX 250 WO HCPCS SELF ADMINISTERED DRUGS (ALT 637 FOR MEDICARE OP)

## 2024-01-13 PROCEDURE — 84550 ASSAY OF BLOOD/URIC ACID: CPT | Performed by: HOSPITALIST

## 2024-01-13 PROCEDURE — 83735 ASSAY OF MAGNESIUM: CPT | Performed by: HOSPITALIST

## 2024-01-13 PROCEDURE — 1170000001 HC PRIVATE ONCOLOGY ROOM DAILY

## 2024-01-13 PROCEDURE — 99233 SBSQ HOSP IP/OBS HIGH 50: CPT

## 2024-01-13 PROCEDURE — 2500000001 HC RX 250 WO HCPCS SELF ADMINISTERED DRUGS (ALT 637 FOR MEDICARE OP): Performed by: HOSPITALIST

## 2024-01-13 PROCEDURE — 2500000004 HC RX 250 GENERAL PHARMACY W/ HCPCS (ALT 636 FOR OP/ED): Performed by: INTERNAL MEDICINE

## 2024-01-13 RX ORDER — ONDANSETRON HYDROCHLORIDE 2 MG/ML
8 INJECTION, SOLUTION INTRAVENOUS ONCE
Status: CANCELLED | OUTPATIENT
Start: 2024-01-14

## 2024-01-13 RX ORDER — ONDANSETRON HYDROCHLORIDE 2 MG/ML
8 INJECTION, SOLUTION INTRAVENOUS ONCE
Status: COMPLETED | OUTPATIENT
Start: 2024-01-13 | End: 2024-01-13

## 2024-01-13 RX ORDER — DEXAMETHASONE SODIUM PHOSPHATE 4 MG/ML
8 INJECTION, SOLUTION INTRA-ARTICULAR; INTRALESIONAL; INTRAMUSCULAR; INTRAVENOUS; SOFT TISSUE ONCE
Status: COMPLETED | OUTPATIENT
Start: 2024-01-13 | End: 2024-01-13

## 2024-01-13 RX ORDER — ONDANSETRON HYDROCHLORIDE 2 MG/ML
8 INJECTION, SOLUTION INTRAVENOUS ONCE
Status: CANCELLED | OUTPATIENT
Start: 2024-01-13

## 2024-01-13 RX ORDER — DEXAMETHASONE SODIUM PHOSPHATE 4 MG/ML
8 INJECTION, SOLUTION INTRA-ARTICULAR; INTRALESIONAL; INTRAMUSCULAR; INTRAVENOUS; SOFT TISSUE ONCE
Status: CANCELLED | OUTPATIENT
Start: 2024-01-13

## 2024-01-13 RX ORDER — DEXAMETHASONE SODIUM PHOSPHATE 4 MG/ML
8 INJECTION, SOLUTION INTRA-ARTICULAR; INTRALESIONAL; INTRAMUSCULAR; INTRAVENOUS; SOFT TISSUE ONCE
Status: CANCELLED | OUTPATIENT
Start: 2024-01-14

## 2024-01-13 RX ADMIN — GUAIFENESIN 600 MG: 600 TABLET ORAL at 08:52

## 2024-01-13 RX ADMIN — POLYETHYLENE GLYCOL 3350 17 G: 17 POWDER, FOR SOLUTION ORAL at 08:44

## 2024-01-13 RX ADMIN — ENOXAPARIN SODIUM 40 MG: 100 INJECTION SUBCUTANEOUS at 21:08

## 2024-01-13 RX ADMIN — ONDANSETRON 8 MG: 2 INJECTION INTRAMUSCULAR; INTRAVENOUS at 22:58

## 2024-01-13 RX ADMIN — SODIUM CHLORIDE 100 ML/HR: 9 INJECTION, SOLUTION INTRAVENOUS at 08:44

## 2024-01-13 RX ADMIN — ROSUVASTATIN 5 MG: 10 TABLET, FILM COATED ORAL at 10:55

## 2024-01-13 RX ADMIN — GABAPENTIN 100 MG: 100 CAPSULE ORAL at 21:08

## 2024-01-13 RX ADMIN — OLANZAPINE 5 MG: 5 TABLET, FILM COATED ORAL at 21:08

## 2024-01-13 RX ADMIN — Medication 1 TABLET: at 08:44

## 2024-01-13 RX ADMIN — DEXAMETHASONE SODIUM PHOSPHATE 8 MG: 4 INJECTION INTRA-ARTICULAR; INTRALESIONAL; INTRAMUSCULAR; INTRAVENOUS; SOFT TISSUE at 22:59

## 2024-01-13 RX ADMIN — ALLOPURINOL 300 MG: 300 TABLET ORAL at 08:44

## 2024-01-13 RX ADMIN — ETOPOSIDE 218 MG: 20 INJECTION INTRAVENOUS at 23:24

## 2024-01-13 ASSESSMENT — COGNITIVE AND FUNCTIONAL STATUS - GENERAL
WALKING IN HOSPITAL ROOM: A LOT
DRESSING REGULAR LOWER BODY CLOTHING: A LITTLE
DRESSING REGULAR LOWER BODY CLOTHING: A LITTLE
TOILETING: A LITTLE
CLIMB 3 TO 5 STEPS WITH RAILING: A LOT
STANDING UP FROM CHAIR USING ARMS: A LITTLE
DRESSING REGULAR UPPER BODY CLOTHING: A LITTLE
DAILY ACTIVITIY SCORE: 20
HELP NEEDED FOR BATHING: A LITTLE
CLIMB 3 TO 5 STEPS WITH RAILING: A LOT
STANDING UP FROM CHAIR USING ARMS: A LITTLE
DRESSING REGULAR UPPER BODY CLOTHING: A LITTLE
WALKING IN HOSPITAL ROOM: A LITTLE
MOBILITY SCORE: 20
DAILY ACTIVITIY SCORE: 21
TOILETING: A LITTLE
MOBILITY SCORE: 19

## 2024-01-13 ASSESSMENT — PAIN - FUNCTIONAL ASSESSMENT
PAIN_FUNCTIONAL_ASSESSMENT: 0-10
PAIN_FUNCTIONAL_ASSESSMENT: 0-10

## 2024-01-13 ASSESSMENT — PAIN SCALES - GENERAL
PAINLEVEL_OUTOF10: 0 - NO PAIN
PAINLEVEL_OUTOF10: 3

## 2024-01-13 NOTE — SIGNIFICANT EVENT
"Called by bedside RN to pt room around 2100 after he reported visual disturbance, dyspnea, and feeling like he was going to pass out within the span of 5-10 seconds while receiving fosaprepitant infusion. Infusion was immediately stopped by bedside RN who also witnessed \"blotchy red rash\" of patient L leg and arm that quickly resolved within a few minutes. When arrived to bedside, patient sitting up on edge of bed in no acute distress breathing comfortably. Denies nausea, vomiting, or abdominal pain. Vital signs stable on room air. On exam no evidence of rash or angioedema and lungs CTAB.  Patient reports this is the first time he has ever received fosaprepitant. He does have an allergy to bee venom and carries an EpiPen with last use about a year ago. No other drug reaction history.    It is possible this was a drug reaction given patient history of atopy though the symptoms were very short lived and had mostly resolved prior to MD arrival. Some symptoms were also atypical for allergic reaction such as the visual changes and presyncope. Patient had already received dexamethasone prior to fosaprepitant infusion. Around 30 mins post episode he reported some continued chest tightness (still saturating well on room air) so received an albuterol breathing treatment.    [facility] secure messaged oncologist Dr. Sandhu regarding plan for chemotherapy to follow fosaprepitant. She advised continuing with etoposide and carboplatin as planned.    Patient seen and discussed with NACR.    Olesya Goel MD  PGY-1 Internal Medicine    "

## 2024-01-13 NOTE — NURSING NOTE
Pt received carboplatin and etoposide. Premedicated with dexamethasone and zofran. Reacted to fosaprepitant (see previous note), infusion stopped and not completed. Chemo infused through 20 LFA positive for brisk blood return before and after infusions complete. Pt tolerated carboplatin and etoposide infusions well with no adverse reactions during infusion. Pt educated to notify RN of any new signs/symptoms experienced.

## 2024-01-13 NOTE — NURSING NOTE
Pt called RN into room. Pt sweating, vision disturbances, shortness of breath and feeling like he was going to pass out. Fosaprepitant infusing and stopped immediately. Vitals completed and as charted. Pt reported improvement in symptoms after infusion stopped. Team at bedside. Reaching out to Dr Sandhu to determine next course of action.   Per Dr Sandhu, continue with carboplatin and etoposide infusion as ordered.

## 2024-01-13 NOTE — PROGRESS NOTES
"Mike Franco is a 63 y.o. male on day 2 of admission presenting with Lung cancer metastatic to bone (CMS/HCC).    Subjective   Patient reports feeling well this morning, denies any recurrence of shortness of breath or vision \"floaters\" after infusion reaction occurred with fosaprepitant overnight. Tolerated carboplatin/etoposide infusion with no issues. Otherwise denies any current fever/chills, N/V, SOB, abdominal pain, diarrhea, fatigue, or weakness.       Objective     Physical Exam  Constitutional:       General: He is not in acute distress.     Appearance: Normal appearance.   HENT:      Head: Normocephalic and atraumatic.      Nose: Nose normal.      Mouth/Throat:      Mouth: Mucous membranes are moist.      Pharynx: Oropharynx is clear. No posterior oropharyngeal erythema.   Eyes:      General: No scleral icterus.     Extraocular Movements: Extraocular movements intact.      Conjunctiva/sclera: Conjunctivae normal.      Pupils: Pupils are equal, round, and reactive to light.   Cardiovascular:      Rate and Rhythm: Normal rate and regular rhythm.      Pulses: Normal pulses.      Heart sounds: Normal heart sounds.   Pulmonary:      Effort: Pulmonary effort is normal. No respiratory distress.      Breath sounds: Normal breath sounds.   Abdominal:      General: Abdomen is flat. There is no distension.      Palpations: Abdomen is soft.      Tenderness: There is no abdominal tenderness.   Musculoskeletal:         General: Normal range of motion.      Cervical back: Normal range of motion and neck supple.      Right lower leg: No edema.      Left lower leg: No edema.   Skin:     General: Skin is warm and dry.   Neurological:      General: No focal deficit present.      Mental Status: He is alert and oriented to person, place, and time.   Psychiatric:         Mood and Affect: Mood normal.         Behavior: Behavior normal.         Last Recorded Vitals  Blood pressure (!) 135/91, pulse 86, temperature 36.9 °C (98.4 " "°F), temperature source Temporal, resp. rate 18, height 1.801 m (5' 10.91\"), weight 85.3 kg (188 lb 0.8 oz), SpO2 98 %.  Intake/Output last 3 Shifts:  I/O last 3 completed shifts:  In: 453.3 (5.3 mL/kg) [I.V.:453.3 (5.3 mL/kg)]  Out: 2075 (24.3 mL/kg) [Urine:2075 (0.7 mL/kg/hr)]  Weight: 85.3 kg     Relevant Results  Results for orders placed or performed during the hospital encounter of 01/11/24 (from the past 24 hour(s))   Renal Function Panel   Result Value Ref Range    Glucose 68 (L) 74 - 99 mg/dL    Sodium 139 136 - 145 mmol/L    Potassium 4.0 3.5 - 5.3 mmol/L    Chloride 103 98 - 107 mmol/L    Bicarbonate 27 21 - 32 mmol/L    Anion Gap 13 10 - 20 mmol/L    Urea Nitrogen 13 6 - 23 mg/dL    Creatinine 0.87 0.50 - 1.30 mg/dL    eGFR >90 >60 mL/min/1.73m*2    Calcium 9.5 8.6 - 10.6 mg/dL    Phosphorus 4.1 2.5 - 4.9 mg/dL    Albumin 3.7 3.4 - 5.0 g/dL   Uric Acid   Result Value Ref Range    Uric Acid 5.5 4.0 - 7.5 mg/dL   Lactate Dehydrogenase   Result Value Ref Range     (H) 84 - 246 U/L   Phosphorus   Result Value Ref Range    Phosphorus 2.7 2.5 - 4.9 mg/dL   Uric Acid   Result Value Ref Range    Uric Acid 4.6 4.0 - 7.5 mg/dL   Lactate Dehydrogenase   Result Value Ref Range     (H) 84 - 246 U/L   CBC and Auto Differential   Result Value Ref Range    WBC 9.1 4.4 - 11.3 x10*3/uL    nRBC 0.0 0.0 - 0.0 /100 WBCs    RBC 4.57 4.50 - 5.90 x10*6/uL    Hemoglobin 14.0 13.5 - 17.5 g/dL    Hematocrit 43.6 41.0 - 52.0 %    MCV 95 80 - 100 fL    MCH 30.6 26.0 - 34.0 pg    MCHC 32.1 32.0 - 36.0 g/dL    RDW 13.5 11.5 - 14.5 %    Platelets 377 150 - 450 x10*3/uL    Neutrophils % 89.4 40.0 - 80.0 %    Immature Granulocytes %, Automated 0.7 0.0 - 0.9 %    Lymphocytes % 8.8 13.0 - 44.0 %    Monocytes % 0.8 2.0 - 10.0 %    Eosinophils % 0.1 0.0 - 6.0 %    Basophils % 0.2 0.0 - 2.0 %    Neutrophils Absolute 8.09 (H) 1.20 - 7.70 x10*3/uL    Immature Granulocytes Absolute, Automated 0.06 0.00 - 0.70 x10*3/uL    " Lymphocytes Absolute 0.80 (L) 1.20 - 4.80 x10*3/uL    Monocytes Absolute 0.07 (L) 0.10 - 1.00 x10*3/uL    Eosinophils Absolute 0.01 0.00 - 0.70 x10*3/uL    Basophils Absolute 0.02 0.00 - 0.10 x10*3/uL   Comprehensive Metabolic Panel   Result Value Ref Range    Glucose 136 (H) 74 - 99 mg/dL    Sodium 141 136 - 145 mmol/L    Potassium 4.3 3.5 - 5.3 mmol/L    Chloride 107 98 - 107 mmol/L    Bicarbonate 26 21 - 32 mmol/L    Anion Gap 12 10 - 20 mmol/L    Urea Nitrogen 11 6 - 23 mg/dL    Creatinine 0.78 0.50 - 1.30 mg/dL    eGFR >90 >60 mL/min/1.73m*2    Calcium 9.2 8.6 - 10.6 mg/dL    Albumin 3.7 3.4 - 5.0 g/dL    Alkaline Phosphatase 48 33 - 136 U/L    Total Protein 6.5 6.4 - 8.2 g/dL    AST 13 9 - 39 U/L    Bilirubin, Total 0.5 0.0 - 1.2 mg/dL    ALT 11 10 - 52 U/L   Magnesium   Result Value Ref Range    Magnesium 1.96 1.60 - 2.40 mg/dL   Renal Function Panel   Result Value Ref Range    Glucose 136 (H) 74 - 99 mg/dL    Sodium 141 136 - 145 mmol/L    Potassium 4.3 3.5 - 5.3 mmol/L    Chloride 107 98 - 107 mmol/L    Bicarbonate 26 21 - 32 mmol/L    Anion Gap 12 10 - 20 mmol/L    Urea Nitrogen 11 6 - 23 mg/dL    Creatinine 0.78 0.50 - 1.30 mg/dL    eGFR >90 >60 mL/min/1.73m*2    Calcium 9.2 8.6 - 10.6 mg/dL    Phosphorus 3.1 2.5 - 4.9 mg/dL    Albumin 3.7 3.4 - 5.0 g/dL   Uric Acid   Result Value Ref Range    Uric Acid 5.0 4.0 - 7.5 mg/dL   Lactate Dehydrogenase   Result Value Ref Range     (H) 84 - 246 U/L     No new imaging to review           Assessment/Plan   Principal Problem:    Lung cancer metastatic to bone (CMS/HCC)  Mr. Franco is a 62 yo M with PMHx of hyperlipidemia, phantom limb pain 2/2 left AKA (2003), and newly diagnosed stage IVB small cell lung cancer of YOHANNES (mets to spine, mediastinal and supraclavicular Lns, follows with Dr. Sandhu) admitted for initiation of carboplatin/etoposide therapy.     Transfusion reaction with fosaprepitant noted overnight. Patient endorses sudden-onset skin, blotching,  "shortness of breath, nausea, and vision \"floaters\" shortly after initiation of fosaprepitant. Infusion was stopped and patient had rapid resolution of symptoms. Case discussed with Dr. Sandhu overnight and decision was made to continue with chemo initiation. Patient tolerated carbo/etoposide well. Allergy list in chart updated to reflect fosaprepitant allergy.     Patient remains stable this morning, LDH remains mildly elevated (326) but uric acid, phosphorus, and calcium WNL.      Updates 1/13:  -Patient tolerating first dose of chemo well. Per discussion with Dr. 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 Dr. Sandhu aware     #HLD  - continue home rosuvastatin 5mg      #Phantom limb pain s/p L AKA  - continue home gabapentin 100mg     F: PRN  E: Mg>2 k>3.5  N: regular diet  DVT PPX: lovenox  GI PPX: not indicated     Full code  NOK: Ellie Hamilton ( S/O) : [phone]/ [phone]               Yasmin Esparza MD      "

## 2024-01-13 NOTE — HOSPITAL COURSE
"Mr. Franco is a 62 yo M with PMHx of hyperlipidemia, phantom limb pain 2/2 left AKA (2003), and newly diagnosed stage IVB small cell lung cancer of YOHANNES (mets to spine, mediastinal and supraclavicular Lns, follows with Dr. Sandhu) admitted for initiation of carboplatin/etoposide therapy.      Experienced transfusion reaction with fosaprepitant noted on day 1 of chemo, with sudden-onset skin, blotching, shortness of breath, nausea, and vision \"floaters\" shortly after initiation of fosaprepitant. Infusion was stopped and patient had rapid resolution of symptoms. Case discussed with Dr. Sandhu overnight and decision was made to continue with chemo initiation. Patient tolerated carbo/etoposide well. Allergy list in chart updated to reflect fosaprepitant allergy.     Patient completed full cycle of chemotherapy. Will be discharged home today, with plan to follow-up with Dr. Sandhu on 1/17/23.  "

## 2024-01-13 NOTE — CARE PLAN
The patient's goals for the shift include      The clinical goals for the shift include pt will remain safe and free from injury throughout shift on 01/12/2024 [id number]    Problem: Pain  Goal: My pain/discomfort is manageable  Outcome: Progressing     Problem: Safety  Goal: Patient will be injury free during hospitalization  Outcome: Progressing  Goal: I will remain free of falls  Outcome: Progressing     Problem: Daily Care  Goal: Daily care needs are met  Outcome: Progressing     Problem: Psychosocial Needs  Goal: Demonstrates ability to cope with hospitalization/illness  Outcome: Progressing  Goal: Collaborate with me, my family, and caregiver to identify my specific goals  Outcome: Progressing     Problem: Discharge Barriers  Goal: My discharge needs are met  Outcome: Progressing

## 2024-01-14 LAB
ALBUMIN SERPL BCP-MCNC: 3.4 G/DL (ref 3.4–5)
ALBUMIN SERPL BCP-MCNC: 3.4 G/DL (ref 3.4–5)
ALBUMIN SERPL BCP-MCNC: 3.6 G/DL (ref 3.4–5)
ALBUMIN SERPL BCP-MCNC: 3.7 G/DL (ref 3.4–5)
ALP SERPL-CCNC: 45 U/L (ref 33–136)
ALT SERPL W P-5'-P-CCNC: 11 U/L (ref 10–52)
ANION GAP SERPL CALC-SCNC: 13 MMOL/L (ref 10–20)
ANION GAP SERPL CALC-SCNC: 14 MMOL/L (ref 10–20)
AST SERPL W P-5'-P-CCNC: 12 U/L (ref 9–39)
BASOPHILS # BLD AUTO: 0.02 X10*3/UL (ref 0–0.1)
BASOPHILS NFR BLD AUTO: 0.1 %
BILIRUB SERPL-MCNC: 0.3 MG/DL (ref 0–1.2)
BUN SERPL-MCNC: 12 MG/DL (ref 6–23)
BUN SERPL-MCNC: 14 MG/DL (ref 6–23)
BUN SERPL-MCNC: 14 MG/DL (ref 6–23)
BUN SERPL-MCNC: 15 MG/DL (ref 6–23)
CALCIUM SERPL-MCNC: 8.4 MG/DL (ref 8.6–10.6)
CALCIUM SERPL-MCNC: 8.4 MG/DL (ref 8.6–10.6)
CALCIUM SERPL-MCNC: 8.8 MG/DL (ref 8.6–10.6)
CALCIUM SERPL-MCNC: 8.9 MG/DL (ref 8.6–10.6)
CHLORIDE SERPL-SCNC: 105 MMOL/L (ref 98–107)
CHLORIDE SERPL-SCNC: 108 MMOL/L (ref 98–107)
CHLORIDE SERPL-SCNC: 111 MMOL/L (ref 98–107)
CHLORIDE SERPL-SCNC: 111 MMOL/L (ref 98–107)
CO2 SERPL-SCNC: 19 MMOL/L (ref 21–32)
CO2 SERPL-SCNC: 20 MMOL/L (ref 21–32)
CO2 SERPL-SCNC: 20 MMOL/L (ref 21–32)
CO2 SERPL-SCNC: 25 MMOL/L (ref 21–32)
CREAT SERPL-MCNC: 0.79 MG/DL (ref 0.5–1.3)
CREAT SERPL-MCNC: 0.82 MG/DL (ref 0.5–1.3)
CREAT SERPL-MCNC: 0.87 MG/DL (ref 0.5–1.3)
CREAT SERPL-MCNC: 0.87 MG/DL (ref 0.5–1.3)
EGFRCR SERPLBLD CKD-EPI 2021: >90 ML/MIN/1.73M*2
EOSINOPHIL # BLD AUTO: 0 X10*3/UL (ref 0–0.7)
EOSINOPHIL NFR BLD AUTO: 0 %
ERYTHROCYTE [DISTWIDTH] IN BLOOD BY AUTOMATED COUNT: 13.7 % (ref 11.5–14.5)
GLUCOSE SERPL-MCNC: 109 MG/DL (ref 74–99)
GLUCOSE SERPL-MCNC: 113 MG/DL (ref 74–99)
HCT VFR BLD AUTO: 40.5 % (ref 41–52)
HGB BLD-MCNC: 13.2 G/DL (ref 13.5–17.5)
IMM GRANULOCYTES # BLD AUTO: 0.09 X10*3/UL (ref 0–0.7)
IMM GRANULOCYTES NFR BLD AUTO: 0.6 % (ref 0–0.9)
LDH SERPL L TO P-CCNC: 276 U/L (ref 84–246)
LDH SERPL L TO P-CCNC: 315 U/L (ref 84–246)
LDH SERPL L TO P-CCNC: 347 U/L (ref 84–246)
LYMPHOCYTES # BLD AUTO: 1.11 X10*3/UL (ref 1.2–4.8)
LYMPHOCYTES NFR BLD AUTO: 6.9 %
MAGNESIUM SERPL-MCNC: 1.92 MG/DL (ref 1.6–2.4)
MCH RBC QN AUTO: 31 PG (ref 26–34)
MCHC RBC AUTO-ENTMCNC: 32.6 G/DL (ref 32–36)
MCV RBC AUTO: 95 FL (ref 80–100)
MONOCYTES # BLD AUTO: 0.42 X10*3/UL (ref 0.1–1)
MONOCYTES NFR BLD AUTO: 2.6 %
NEUTROPHILS # BLD AUTO: 14.34 X10*3/UL (ref 1.2–7.7)
NEUTROPHILS NFR BLD AUTO: 89.8 %
NRBC BLD-RTO: 0 /100 WBCS (ref 0–0)
PHOSPHATE SERPL-MCNC: 2.6 MG/DL (ref 2.5–4.9)
PHOSPHATE SERPL-MCNC: 2.7 MG/DL (ref 2.5–4.9)
PHOSPHATE SERPL-MCNC: 3 MG/DL (ref 2.5–4.9)
PLATELET # BLD AUTO: 364 X10*3/UL (ref 150–450)
POTASSIUM SERPL-SCNC: 4 MMOL/L (ref 3.5–5.3)
POTASSIUM SERPL-SCNC: 4.1 MMOL/L (ref 3.5–5.3)
POTASSIUM SERPL-SCNC: 4.1 MMOL/L (ref 3.5–5.3)
POTASSIUM SERPL-SCNC: 4.2 MMOL/L (ref 3.5–5.3)
PROT SERPL-MCNC: 5.7 G/DL (ref 6.4–8.2)
RBC # BLD AUTO: 4.26 X10*6/UL (ref 4.5–5.9)
SODIUM SERPL-SCNC: 137 MMOL/L (ref 136–145)
SODIUM SERPL-SCNC: 139 MMOL/L (ref 136–145)
SODIUM SERPL-SCNC: 140 MMOL/L (ref 136–145)
SODIUM SERPL-SCNC: 140 MMOL/L (ref 136–145)
URATE SERPL-MCNC: 3.3 MG/DL (ref 4–7.5)
URATE SERPL-MCNC: 3.7 MG/DL (ref 4–7.5)
URATE SERPL-MCNC: 3.8 MG/DL (ref 4–7.5)
WBC # BLD AUTO: 16 X10*3/UL (ref 4.4–11.3)

## 2024-01-14 PROCEDURE — 36415 COLL VENOUS BLD VENIPUNCTURE: CPT | Performed by: HOSPITALIST

## 2024-01-14 PROCEDURE — RXMED WILLOW AMBULATORY MEDICATION CHARGE

## 2024-01-14 PROCEDURE — 2500000004 HC RX 250 GENERAL PHARMACY W/ HCPCS (ALT 636 FOR OP/ED)

## 2024-01-14 PROCEDURE — 83735 ASSAY OF MAGNESIUM: CPT | Performed by: HOSPITALIST

## 2024-01-14 PROCEDURE — 2500000001 HC RX 250 WO HCPCS SELF ADMINISTERED DRUGS (ALT 637 FOR MEDICARE OP)

## 2024-01-14 PROCEDURE — 99233 SBSQ HOSP IP/OBS HIGH 50: CPT

## 2024-01-14 PROCEDURE — 80053 COMPREHEN METABOLIC PANEL: CPT | Performed by: HOSPITALIST

## 2024-01-14 PROCEDURE — 2500000004 HC RX 250 GENERAL PHARMACY W/ HCPCS (ALT 636 FOR OP/ED): Performed by: HOSPITALIST

## 2024-01-14 PROCEDURE — 80069 RENAL FUNCTION PANEL: CPT | Mod: CCI | Performed by: HOSPITALIST

## 2024-01-14 PROCEDURE — 82435 ASSAY OF BLOOD CHLORIDE: CPT | Performed by: HOSPITALIST

## 2024-01-14 PROCEDURE — 85025 COMPLETE CBC W/AUTO DIFF WBC: CPT | Performed by: HOSPITALIST

## 2024-01-14 PROCEDURE — 2500000004 HC RX 250 GENERAL PHARMACY W/ HCPCS (ALT 636 FOR OP/ED): Performed by: INTERNAL MEDICINE

## 2024-01-14 PROCEDURE — 2500000001 HC RX 250 WO HCPCS SELF ADMINISTERED DRUGS (ALT 637 FOR MEDICARE OP): Performed by: HOSPITALIST

## 2024-01-14 PROCEDURE — 84550 ASSAY OF BLOOD/URIC ACID: CPT | Performed by: HOSPITALIST

## 2024-01-14 PROCEDURE — 83615 LACTATE (LD) (LDH) ENZYME: CPT | Performed by: HOSPITALIST

## 2024-01-14 PROCEDURE — 1170000001 HC PRIVATE ONCOLOGY ROOM DAILY

## 2024-01-14 PROCEDURE — 93010 ELECTROCARDIOGRAM REPORT: CPT | Performed by: INTERNAL MEDICINE

## 2024-01-14 RX ORDER — DEXAMETHASONE SODIUM PHOSPHATE 4 MG/ML
8 INJECTION, SOLUTION INTRA-ARTICULAR; INTRALESIONAL; INTRAMUSCULAR; INTRAVENOUS; SOFT TISSUE ONCE
Status: COMPLETED | OUTPATIENT
Start: 2024-01-14 | End: 2024-01-14

## 2024-01-14 RX ORDER — OXYCODONE HYDROCHLORIDE 5 MG/1
10 TABLET ORAL EVERY 6 HOURS PRN
Status: DISCONTINUED | OUTPATIENT
Start: 2024-01-14 | End: 2024-01-15 | Stop reason: HOSPADM

## 2024-01-14 RX ORDER — PROCHLORPERAZINE MALEATE 10 MG
10 TABLET ORAL EVERY 6 HOURS PRN
Qty: 30 TABLET | Refills: 0 | Status: SHIPPED | OUTPATIENT
Start: 2024-01-14 | End: 2024-01-17 | Stop reason: WASHOUT

## 2024-01-14 RX ORDER — OXYCODONE HYDROCHLORIDE 5 MG/1
5 TABLET ORAL EVERY 6 HOURS PRN
Status: DISCONTINUED | OUTPATIENT
Start: 2024-01-14 | End: 2024-01-15 | Stop reason: HOSPADM

## 2024-01-14 RX ORDER — ALLOPURINOL 300 MG/1
300 TABLET ORAL DAILY
Qty: 7 TABLET | Refills: 0 | Status: SHIPPED | OUTPATIENT
Start: 2024-01-15 | End: 2024-01-15 | Stop reason: HOSPADM

## 2024-01-14 RX ORDER — MAGNESIUM SULFATE HEPTAHYDRATE 40 MG/ML
2 INJECTION, SOLUTION INTRAVENOUS ONCE
Status: COMPLETED | OUTPATIENT
Start: 2024-01-14 | End: 2024-01-14

## 2024-01-14 RX ORDER — CALCIUM CARBONATE 200(500)MG
1000 TABLET,CHEWABLE ORAL ONCE
Status: COMPLETED | OUTPATIENT
Start: 2024-01-14 | End: 2024-01-14

## 2024-01-14 RX ORDER — MAGNESIUM SULFATE HEPTAHYDRATE 40 MG/ML
INJECTION, SOLUTION INTRAVENOUS
Status: DISPENSED
Start: 2024-01-14 | End: 2024-01-14

## 2024-01-14 RX ORDER — OLANZAPINE 5 MG/1
5 TABLET ORAL NIGHTLY
Qty: 2 TABLET | Refills: 0 | Status: SHIPPED | OUTPATIENT
Start: 2024-01-14 | End: 2024-01-17 | Stop reason: WASHOUT

## 2024-01-14 RX ORDER — ONDANSETRON HYDROCHLORIDE 2 MG/ML
8 INJECTION, SOLUTION INTRAVENOUS ONCE
Status: COMPLETED | OUTPATIENT
Start: 2024-01-14 | End: 2024-01-14

## 2024-01-14 RX ADMIN — Medication 1 TABLET: at 08:18

## 2024-01-14 RX ADMIN — POLYETHYLENE GLYCOL 3350 17 G: 17 POWDER, FOR SOLUTION ORAL at 08:18

## 2024-01-14 RX ADMIN — SODIUM CHLORIDE 218 MG: 0.9 INJECTION, SOLUTION INTRAVENOUS at 23:00

## 2024-01-14 RX ADMIN — OLANZAPINE 5 MG: 5 TABLET, FILM COATED ORAL at 21:06

## 2024-01-14 RX ADMIN — ENOXAPARIN SODIUM 40 MG: 100 INJECTION SUBCUTANEOUS at 21:06

## 2024-01-14 RX ADMIN — ONDANSETRON 8 MG: 2 INJECTION INTRAMUSCULAR; INTRAVENOUS at 22:55

## 2024-01-14 RX ADMIN — CALCIUM CARBONATE (ANTACID) CHEW TAB 500 MG 1000 MG: 500 CHEW TAB at 08:18

## 2024-01-14 RX ADMIN — ROSUVASTATIN 5 MG: 10 TABLET, FILM COATED ORAL at 08:18

## 2024-01-14 RX ADMIN — GABAPENTIN 100 MG: 100 CAPSULE ORAL at 21:06

## 2024-01-14 RX ADMIN — ALLOPURINOL 300 MG: 300 TABLET ORAL at 08:18

## 2024-01-14 RX ADMIN — OXYCODONE HYDROCHLORIDE 10 MG: 5 TABLET ORAL at 18:25

## 2024-01-14 RX ADMIN — GUAIFENESIN 600 MG: 600 TABLET ORAL at 08:28

## 2024-01-14 RX ADMIN — MAGNESIUM SULFATE HEPTAHYDRATE 2 G: 40 INJECTION, SOLUTION INTRAVENOUS at 03:09

## 2024-01-14 RX ADMIN — DEXAMETHASONE SODIUM PHOSPHATE 8 MG: 4 INJECTION INTRA-ARTICULAR; INTRALESIONAL; INTRAMUSCULAR; INTRAVENOUS; SOFT TISSUE at 22:55

## 2024-01-14 ASSESSMENT — COGNITIVE AND FUNCTIONAL STATUS - GENERAL
HELP NEEDED FOR BATHING: A LITTLE
TOILETING: A LITTLE
WALKING IN HOSPITAL ROOM: A LOT
HELP NEEDED FOR BATHING: A LITTLE
WALKING IN HOSPITAL ROOM: A LOT
MOBILITY SCORE: 18
DRESSING REGULAR LOWER BODY CLOTHING: A LITTLE
CLIMB 3 TO 5 STEPS WITH RAILING: A LOT
STANDING UP FROM CHAIR USING ARMS: A LITTLE
MOVING TO AND FROM BED TO CHAIR: A LITTLE
MOVING TO AND FROM BED TO CHAIR: A LITTLE
TOILETING: A LITTLE
CLIMB 3 TO 5 STEPS WITH RAILING: A LOT
DAILY ACTIVITIY SCORE: 21
DAILY ACTIVITIY SCORE: 21
MOBILITY SCORE: 18
DRESSING REGULAR LOWER BODY CLOTHING: A LITTLE
STANDING UP FROM CHAIR USING ARMS: A LITTLE

## 2024-01-14 ASSESSMENT — PAIN - FUNCTIONAL ASSESSMENT
PAIN_FUNCTIONAL_ASSESSMENT: 0-10

## 2024-01-14 ASSESSMENT — PAIN SCALES - GENERAL
PAINLEVEL_OUTOF10: 6
PAINLEVEL_OUTOF10: 9
PAINLEVEL_OUTOF10: 5 - MODERATE PAIN
PAINLEVEL_OUTOF10: 7

## 2024-01-14 NOTE — CARE PLAN
The patient's goals for the shift include      The clinical goals for the shift include pt will remain safe and free from injury during shift on 01/13/24 [id number]    Problem: Pain  Goal: My pain/discomfort is manageable  Outcome: Progressing     Problem: Safety  Goal: Patient will be injury free during hospitalization  Outcome: Progressing  Goal: I will remain free of falls  Outcome: Progressing     Problem: Daily Care  Goal: Daily care needs are met  Outcome: Progressing     Problem: Psychosocial Needs  Goal: Demonstrates ability to cope with hospitalization/illness  Outcome: Progressing  Goal: Collaborate with me, my family, and caregiver to identify my specific goals  Outcome: Progressing     Problem: Discharge Barriers  Goal: My discharge needs are met  Outcome: Progressing

## 2024-01-14 NOTE — PROGRESS NOTES
"Mike Franco is a 63 y.o. male on day 3 of admission presenting with Lung cancer metastatic to bone (CMS/HCC).    Subjective   Patient feeling well this morning. Tolerating chemo well. Reports some intermittent left sided chest pain that has been ongoing for a while; he also has mild SOB with exertion.       Objective     Physical Exam  GEN:  A&Ox3, no acute distress, appears comfortable.  Conversational and appropriate.  No confusion or gross mental status changes.  EYES: EOMI, non-injected sclera.  CARDIO: Normal rate and regular rhythm. No murmurs, rubs, or gallops.   PULM: diminished breath sounds on the left  SKIN: Warm and dry, no rashes or lesions.  NEURO: Cranial nerves II-XII grossly intact.   PSYCH: Appropriate mood and behavior, converses and responds appropriately during exam    Last Recorded Vitals  Blood pressure 138/75, pulse 83, temperature 37.4 °C (99.4 °F), temperature source Temporal, resp. rate 18, height 1.801 m (5' 10.91\"), weight 85.3 kg (188 lb 0.8 oz), SpO2 95 %.  Intake/Output last 3 Shifts:  I/O last 3 completed shifts:  In: 1485 (17.4 mL/kg) [I.V.:1485 (17.4 mL/kg)]  Out: 3600 (42.2 mL/kg) [Urine:3600 (1.2 mL/kg/hr)]  Weight: 85.3 kg       Assessment/Plan   Principal Problem:    Lung cancer metastatic to bone (CMS/HCC)  Mr. Franco is a 64 yo M with PMHx of hyperlipidemia, phantom limb pain 2/2 left AKA (2003), and newly diagnosed stage IVB small cell lung cancer of YOHANNES (mets to spine, mediastinal and supraclavicular Lns, follows with Dr. Sandhu) admitted for initiation of carboplatin/etoposide therapy.       Updates 1/14:  - Tolerating chemo well; 3rd dose tonight.  - touch base with Dr. Sandhu tomorrow if needs GCSF prior to discharge  - possibly discharge tomorrow if feeling well.  - Continue IV maintenance fluids and allopurinol for TLS ppx     #Stage IVB ([id number]) small cell carcinoma of the left upper lobe, metastatic to spine and mediastinal/supraclavicular LNs  -Admitted for " initiation of carboplatin/etoposide, first dose given evening of 1/12  -Baseline uric acid 6, ; G6PD WNL  -Allopurinol + IV maintenance fluids (100 mL/hr) for TLS ppx  -q8h TLS labs for monitoring  -MR brain obtained for complete staging, punctate nodule noted along right internal auditory canal (possible small schwannoma vs metastatic focus)  -Proceed with day 3 of chemo cycle     #Transfusion reaction to afosaprepitant   -Medication discontinued, allergy list updated. Dr. Sandhu aware     #HLD  - continue home rosuvastatin 5mg      #Phantom limb pain s/p L AKA  - continue home gabapentin 100mg     F: PRN  E: Mg>2 k>3.5  N: regular diet  DVT PPX: lovenox  GI PPX: not indicated     Full code  NOK: Ellie Hamilton ( S/O) : [phone]/ [phone]     Jose Ross MD

## 2024-01-15 ENCOUNTER — APPOINTMENT (OUTPATIENT)
Dept: CARDIOLOGY | Facility: HOSPITAL | Age: 64
DRG: 847 | End: 2024-01-15
Payer: COMMERCIAL

## 2024-01-15 ENCOUNTER — PHARMACY VISIT (OUTPATIENT)
Dept: PHARMACY | Facility: CLINIC | Age: 64
End: 2024-01-15
Payer: MEDICARE

## 2024-01-15 VITALS
HEIGHT: 71 IN | SYSTOLIC BLOOD PRESSURE: 136 MMHG | HEART RATE: 81 BPM | OXYGEN SATURATION: 95 % | TEMPERATURE: 98.6 F | RESPIRATION RATE: 16 BRPM | BODY MASS INDEX: 26.33 KG/M2 | WEIGHT: 188.05 LBS | DIASTOLIC BLOOD PRESSURE: 82 MMHG

## 2024-01-15 LAB
ALBUMIN SERPL BCP-MCNC: 3.4 G/DL (ref 3.4–5)
ALBUMIN SERPL BCP-MCNC: 3.6 G/DL (ref 3.4–5)
ALBUMIN SERPL BCP-MCNC: 3.6 G/DL (ref 3.4–5)
ALP SERPL-CCNC: 48 U/L (ref 33–136)
ALT SERPL W P-5'-P-CCNC: 15 U/L (ref 10–52)
ANION GAP SERPL CALC-SCNC: 11 MMOL/L (ref 10–20)
ANION GAP SERPL CALC-SCNC: 12 MMOL/L (ref 10–20)
ANION GAP SERPL CALC-SCNC: 14 MMOL/L (ref 10–20)
AST SERPL W P-5'-P-CCNC: 17 U/L (ref 9–39)
BASOPHILS # BLD AUTO: 0.01 X10*3/UL (ref 0–0.1)
BASOPHILS NFR BLD AUTO: 0.1 %
BILIRUB SERPL-MCNC: 0.3 MG/DL (ref 0–1.2)
BUN SERPL-MCNC: 11 MG/DL (ref 6–23)
BUN SERPL-MCNC: 12 MG/DL (ref 6–23)
BUN SERPL-MCNC: 13 MG/DL (ref 6–23)
CALCIUM SERPL-MCNC: 8.3 MG/DL (ref 8.6–10.6)
CALCIUM SERPL-MCNC: 8.5 MG/DL (ref 8.6–10.6)
CALCIUM SERPL-MCNC: 8.9 MG/DL (ref 8.6–10.6)
CHLORIDE SERPL-SCNC: 105 MMOL/L (ref 98–107)
CHLORIDE SERPL-SCNC: 109 MMOL/L (ref 98–107)
CHLORIDE SERPL-SCNC: 109 MMOL/L (ref 98–107)
CO2 SERPL-SCNC: 22 MMOL/L (ref 21–32)
CO2 SERPL-SCNC: 24 MMOL/L (ref 21–32)
CO2 SERPL-SCNC: 26 MMOL/L (ref 21–32)
CREAT SERPL-MCNC: 0.65 MG/DL (ref 0.5–1.3)
CREAT SERPL-MCNC: 0.66 MG/DL (ref 0.5–1.3)
CREAT SERPL-MCNC: 0.68 MG/DL (ref 0.5–1.3)
EGFRCR SERPLBLD CKD-EPI 2021: >90 ML/MIN/1.73M*2
EOSINOPHIL # BLD AUTO: 0 X10*3/UL (ref 0–0.7)
EOSINOPHIL NFR BLD AUTO: 0 %
ERYTHROCYTE [DISTWIDTH] IN BLOOD BY AUTOMATED COUNT: 13.8 % (ref 11.5–14.5)
GLUCOSE SERPL-MCNC: 112 MG/DL (ref 74–99)
GLUCOSE SERPL-MCNC: 127 MG/DL (ref 74–99)
GLUCOSE SERPL-MCNC: 99 MG/DL (ref 74–99)
HCT VFR BLD AUTO: 40.2 % (ref 41–52)
HGB BLD-MCNC: 13.1 G/DL (ref 13.5–17.5)
IMM GRANULOCYTES # BLD AUTO: 0.08 X10*3/UL (ref 0–0.7)
IMM GRANULOCYTES NFR BLD AUTO: 0.6 % (ref 0–0.9)
LDH SERPL L TO P-CCNC: 383 U/L (ref 84–246)
LDH SERPL L TO P-CCNC: 386 U/L (ref 84–246)
LYMPHOCYTES # BLD AUTO: 0.79 X10*3/UL (ref 1.2–4.8)
LYMPHOCYTES NFR BLD AUTO: 5.7 %
MAGNESIUM SERPL-MCNC: 2.1 MG/DL (ref 1.6–2.4)
MCH RBC QN AUTO: 31.5 PG (ref 26–34)
MCHC RBC AUTO-ENTMCNC: 32.6 G/DL (ref 32–36)
MCV RBC AUTO: 97 FL (ref 80–100)
MONOCYTES # BLD AUTO: 0.15 X10*3/UL (ref 0.1–1)
MONOCYTES NFR BLD AUTO: 1.1 %
NEUTROPHILS # BLD AUTO: 12.82 X10*3/UL (ref 1.2–7.7)
NEUTROPHILS NFR BLD AUTO: 92.5 %
NRBC BLD-RTO: 0 /100 WBCS (ref 0–0)
PHOSPHATE SERPL-MCNC: 2.8 MG/DL (ref 2.5–4.9)
PHOSPHATE SERPL-MCNC: 3 MG/DL (ref 2.5–4.9)
PHOSPHATE SERPL-MCNC: 3.5 MG/DL (ref 2.5–4.9)
PLATELET # BLD AUTO: 334 X10*3/UL (ref 150–450)
POTASSIUM SERPL-SCNC: 4 MMOL/L (ref 3.5–5.3)
POTASSIUM SERPL-SCNC: 4.3 MMOL/L (ref 3.5–5.3)
POTASSIUM SERPL-SCNC: 4.5 MMOL/L (ref 3.5–5.3)
PROT SERPL-MCNC: 5.7 G/DL (ref 6.4–8.2)
RBC # BLD AUTO: 4.16 X10*6/UL (ref 4.5–5.9)
SODIUM SERPL-SCNC: 139 MMOL/L (ref 136–145)
SODIUM SERPL-SCNC: 140 MMOL/L (ref 136–145)
SODIUM SERPL-SCNC: 140 MMOL/L (ref 136–145)
URATE SERPL-MCNC: 3.2 MG/DL (ref 4–7.5)
URATE SERPL-MCNC: 3.9 MG/DL (ref 4–7.5)
WBC # BLD AUTO: 13.9 X10*3/UL (ref 4.4–11.3)

## 2024-01-15 PROCEDURE — 93005 ELECTROCARDIOGRAM TRACING: CPT

## 2024-01-15 PROCEDURE — 80069 RENAL FUNCTION PANEL: CPT | Performed by: HOSPITALIST

## 2024-01-15 PROCEDURE — 36415 COLL VENOUS BLD VENIPUNCTURE: CPT | Performed by: HOSPITALIST

## 2024-01-15 PROCEDURE — 84550 ASSAY OF BLOOD/URIC ACID: CPT

## 2024-01-15 PROCEDURE — 83615 LACTATE (LD) (LDH) ENZYME: CPT

## 2024-01-15 PROCEDURE — 2500000004 HC RX 250 GENERAL PHARMACY W/ HCPCS (ALT 636 FOR OP/ED): Performed by: HOSPITALIST

## 2024-01-15 PROCEDURE — 99238 HOSP IP/OBS DSCHRG MGMT 30/<: CPT

## 2024-01-15 PROCEDURE — 2500000001 HC RX 250 WO HCPCS SELF ADMINISTERED DRUGS (ALT 637 FOR MEDICARE OP): Performed by: HOSPITALIST

## 2024-01-15 PROCEDURE — 84550 ASSAY OF BLOOD/URIC ACID: CPT | Performed by: HOSPITALIST

## 2024-01-15 PROCEDURE — 83615 LACTATE (LD) (LDH) ENZYME: CPT | Performed by: HOSPITALIST

## 2024-01-15 PROCEDURE — 83735 ASSAY OF MAGNESIUM: CPT | Performed by: HOSPITALIST

## 2024-01-15 PROCEDURE — 85025 COMPLETE CBC W/AUTO DIFF WBC: CPT | Performed by: HOSPITALIST

## 2024-01-15 PROCEDURE — 82565 ASSAY OF CREATININE: CPT

## 2024-01-15 PROCEDURE — 2500000001 HC RX 250 WO HCPCS SELF ADMINISTERED DRUGS (ALT 637 FOR MEDICARE OP)

## 2024-01-15 PROCEDURE — 84100 ASSAY OF PHOSPHORUS: CPT | Performed by: HOSPITALIST

## 2024-01-15 PROCEDURE — RXMED WILLOW AMBULATORY MEDICATION CHARGE

## 2024-01-15 RX ORDER — OXYCODONE HYDROCHLORIDE 5 MG/1
5 TABLET ORAL EVERY 6 HOURS PRN
Qty: 30 TABLET | Refills: 0 | Status: ON HOLD | OUTPATIENT
Start: 2024-01-15 | End: 2024-03-14 | Stop reason: SDUPTHER

## 2024-01-15 RX ORDER — ALBUTEROL SULFATE 0.83 MG/ML
3 SOLUTION RESPIRATORY (INHALATION) AS NEEDED
Qty: 75 ML | Refills: 11 | Status: SHIPPED | OUTPATIENT
Start: 2024-01-15

## 2024-01-15 RX ADMIN — POLYETHYLENE GLYCOL 3350 17 G: 17 POWDER, FOR SOLUTION ORAL at 08:17

## 2024-01-15 RX ADMIN — ROSUVASTATIN 5 MG: 10 TABLET, FILM COATED ORAL at 08:17

## 2024-01-15 RX ADMIN — Medication 1 TABLET: at 08:17

## 2024-01-15 RX ADMIN — OXYCODONE HYDROCHLORIDE 10 MG: 5 TABLET ORAL at 06:30

## 2024-01-15 RX ADMIN — OXYCODONE HYDROCHLORIDE 10 MG: 5 TABLET ORAL at 12:02

## 2024-01-15 RX ADMIN — ALLOPURINOL 300 MG: 300 TABLET ORAL at 08:17

## 2024-01-15 RX ADMIN — OXYCODONE HYDROCHLORIDE 10 MG: 5 TABLET ORAL at 00:25

## 2024-01-15 ASSESSMENT — PAIN SCALES - GENERAL
PAINLEVEL_OUTOF10: 7

## 2024-01-15 ASSESSMENT — PAIN - FUNCTIONAL ASSESSMENT
PAIN_FUNCTIONAL_ASSESSMENT: 0-10
PAIN_FUNCTIONAL_ASSESSMENT: 0-10

## 2024-01-15 NOTE — CARE PLAN
The patient's goals for the shift include      The clinical goals for the shift include pt will remain safe and free from injury through shift    Problem: Pain  Goal: My pain/discomfort is manageable  Outcome: Progressing     Problem: Safety  Goal: Patient will be injury free during hospitalization  Outcome: Progressing     Problem: Daily Care  Goal: Daily care needs are met  Outcome: Progressing     Problem: Psychosocial Needs  Goal: Demonstrates ability to cope with hospitalization/illness  Outcome: Progressing

## 2024-01-15 NOTE — DISCHARGE INSTRUCTIONS
Dear Mr. Franco,     You were hospitalized on 1/11 in order to start chemotherapy for your small cell lung cancer. Your treatment consisted of two drugs, carboplatin and etoposide, over a 3 day course. You were noted to have a reaction to one of the anti-nausea drugs (called Emend, or fosaprepitant) before on the first day of treatment, but otherwise tolerated chemotherapy well. Your allergy list has been updated to reflect this reaction to the Emend.     Your labs were monitored closely during chemo and remained stable. You will be discharged home today with scheduled follow-up with Dr. Sandhu on 1/17/24.    It was a pleasure taking care of you. Please do not hesitate to reach out if you have any further questions or concerns.     Sincerely,   Your [facility]

## 2024-01-15 NOTE — DISCHARGE SUMMARY
"Discharge Diagnosis  Lung cancer metastatic to bone (CMS/HCC)    Issues Requiring Follow-Up  -Chemo toxicity check with Dr. Sandhu's office (1/17/24)    Test Results Pending At Discharge  Pending Labs       No current pending labs.            Hospital Course  Mr. Franco is a 62 yo M with PMHx of hyperlipidemia, phantom limb pain 2/2 left AKA (2003), and newly diagnosed stage IVB small cell lung cancer of YOHANNES (mets to spine, mediastinal and supraclavicular Lns, follows with Dr. Sandhu) admitted for initiation of carboplatin/etoposide therapy.      Experienced transfusion reaction with fosaprepitant noted on day 1 of chemo, with sudden-onset skin, blotching, shortness of breath, nausea, and vision \"floaters\" shortly after initiation of fosaprepitant. Infusion was stopped and patient had rapid resolution of symptoms. Case discussed with Dr. Sandhu overnight and decision was made to continue with chemo initiation. Patient tolerated carbo/etoposide well. Allergy list in chart updated to reflect fosaprepitant allergy.     Patient completed full cycle of chemotherapy. Will be discharged home today, with plan to follow-up with Dr. Sandhu on 1/17/23.      Pertinent Physical Exam At Time of Discharge  Physical Exam  Constitutional:       General: He is not in acute distress.     Appearance: Normal appearance.   HENT:      Head: Normocephalic and atraumatic.      Nose: Nose normal.      Mouth/Throat:      Mouth: Mucous membranes are moist.      Pharynx: Oropharynx is clear. No posterior oropharyngeal erythema.   Eyes:      General: No scleral icterus.     Extraocular Movements: Extraocular movements intact.      Conjunctiva/sclera: Conjunctivae normal.      Pupils: Pupils are equal, round, and reactive to light.   Cardiovascular:      Rate and Rhythm: Normal rate and regular rhythm.      Pulses: Normal pulses.      Heart sounds: Normal heart sounds.   Pulmonary:      Effort: Pulmonary effort is normal. No respiratory distress.    "   Breath sounds: Normal breath sounds.   Abdominal:      General: Abdomen is flat. There is no distension.      Palpations: Abdomen is soft.      Tenderness: There is no abdominal tenderness.   Musculoskeletal:         General: Normal range of motion.      Cervical back: Normal range of motion and neck supple.      Comments: Left AKA   Skin:     General: Skin is warm and dry.   Neurological:      General: No focal deficit present.      Mental Status: He is alert and oriented to person, place, and time.   Psychiatric:         Mood and Affect: Mood normal.         Behavior: Behavior normal.         Home Medications     Medication List      START taking these medications     albuterol 2.5 mg /3 mL (0.083 %) nebulizer solution; Take 3 mL by   nebulization every 6 hours if needed (For respiratory rate GREATER THAN OR   EQUAL TO 28 breaths/minute and/or wheezing.)   OLANZapine 5 mg tablet; Commonly known as: ZyPREXA; Take 1 tablet (5 mg)   by mouth once daily at bedtime for 2 doses.   oxyCODONE 5 mg immediate release tablet; Commonly known as: Roxicodone;   Take 1 tablet (5 mg) by mouth every 6 hours if needed for moderate pain (4   - 6).   prochlorperazine 10 mg tablet; Commonly known as: Compazine; Take 1   tablet (10 mg) by mouth every 6 hours if needed for nausea.     CONTINUE taking these medications     ALPRAZolam 0.25 mg tablet; Commonly known as: Xanax   dextromethorphan-guaifenesin  mg 12 hr tablet; Commonly known as:   Mucinex DM   EPINEPHrine 0.3 mg/0.3 mL injection syringe; Commonly known as: Epipen   gabapentin 100 mg capsule; Commonly known as: Neurontin   multivitamin tablet   rosuvastatin 5 mg tablet; Commonly known as: Crestor     STOP taking these medications     amoxicillin 500 mg capsule; Commonly known as: Amoxil       Outpatient Follow-Up  Future Appointments   Date Time Provider Department Center   1/15/2024  3:15 PM [facility] [id number] MRI 1 [id number] [facility]   1/15/2024  4:00 PM [facility] [id number]  MRI 1 [id number] [facility]   1/15/2024  4:45 PM [facility] [id number] MRI 1 [id number] [facility]   1/15/2024  5:30 PM [facility] [id number] MRI 1 [id number] [facility]   1/17/2024  1:00 PM Judy Casanova, APRN-CNP [id number] [facility]   3/7/2024  3:30 PM Real Nichols MD [id number] None       Yasmin Esparza MD

## 2024-01-16 LAB
ATRIAL RATE: 75 BPM
P AXIS: 31 DEGREES
P OFFSET: 191 MS
P ONSET: 138 MS
PR INTERVAL: 158 MS
Q ONSET: 217 MS
QRS COUNT: 12 BEATS
QRS DURATION: 96 MS
QT INTERVAL: 370 MS
QTC CALCULATION(BAZETT): 413 MS
QTC FREDERICIA: 398 MS
R AXIS: -7 DEGREES
T AXIS: 10 DEGREES
T OFFSET: 402 MS
VENTRICULAR RATE: 75 BPM

## 2024-01-17 ENCOUNTER — OFFICE VISIT (OUTPATIENT)
Dept: HEMATOLOGY/ONCOLOGY | Facility: CLINIC | Age: 64
End: 2024-01-17
Payer: COMMERCIAL

## 2024-01-17 ENCOUNTER — APPOINTMENT (OUTPATIENT)
Dept: HEMATOLOGY/ONCOLOGY | Facility: CLINIC | Age: 64
End: 2024-01-17
Payer: COMMERCIAL

## 2024-01-17 VITALS
WEIGHT: 184.97 LBS | RESPIRATION RATE: 18 BRPM | SYSTOLIC BLOOD PRESSURE: 136 MMHG | OXYGEN SATURATION: 93 % | BODY MASS INDEX: 25.87 KG/M2 | TEMPERATURE: 98.8 F | DIASTOLIC BLOOD PRESSURE: 81 MMHG | HEART RATE: 100 BPM

## 2024-01-17 DIAGNOSIS — C34.12 SMALL CELL LUNG CANCER, LEFT UPPER LOBE (MULTI): ICD-10-CM

## 2024-01-17 DIAGNOSIS — C34.90 SCLC (SMALL CELL LUNG CARCINOMA) (MULTI): ICD-10-CM

## 2024-01-17 DIAGNOSIS — R91.8 MASS OF LEFT LUNG: Primary | ICD-10-CM

## 2024-01-17 PROCEDURE — 99214 OFFICE O/P EST MOD 30 MIN: CPT | Performed by: NURSE PRACTITIONER

## 2024-01-17 PROCEDURE — 3079F DIAST BP 80-89 MM HG: CPT | Performed by: NURSE PRACTITIONER

## 2024-01-17 PROCEDURE — 1036F TOBACCO NON-USER: CPT | Performed by: NURSE PRACTITIONER

## 2024-01-17 PROCEDURE — 3075F SYST BP GE 130 - 139MM HG: CPT | Performed by: NURSE PRACTITIONER

## 2024-01-17 RX ORDER — ALBUTEROL SULFATE 90 UG/1
2 AEROSOL, METERED RESPIRATORY (INHALATION) EVERY 6 HOURS PRN
Qty: 18 G | Refills: 11 | Status: SHIPPED | OUTPATIENT
Start: 2024-01-17 | End: 2025-01-16

## 2024-01-17 RX ORDER — ONDANSETRON HYDROCHLORIDE 8 MG/1
8 TABLET, FILM COATED ORAL EVERY 8 HOURS PRN
Qty: 30 TABLET | Refills: 5 | Status: SHIPPED | OUTPATIENT
Start: 2024-01-17 | End: 2024-03-14 | Stop reason: HOSPADM

## 2024-01-17 RX ORDER — PROCHLORPERAZINE MALEATE 10 MG
10 TABLET ORAL EVERY 6 HOURS PRN
Qty: 30 TABLET | Refills: 5 | Status: SHIPPED | OUTPATIENT
Start: 2024-01-17 | End: 2024-03-14 | Stop reason: HOSPADM

## 2024-01-17 RX ORDER — OLANZAPINE 5 MG/1
5 TABLET ORAL NIGHTLY
Qty: 4 TABLET | Refills: 3 | Status: SHIPPED | OUTPATIENT
Start: 2024-01-17 | End: 2024-03-14 | Stop reason: HOSPADM

## 2024-01-17 ASSESSMENT — PAIN SCALES - GENERAL: PAINLEVEL: 8
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
## 2024-01-18 ENCOUNTER — APPOINTMENT (OUTPATIENT)
Dept: HEMATOLOGY/ONCOLOGY | Facility: HOSPITAL | Age: 64
End: 2024-01-18
Payer: COMMERCIAL

## 2024-01-24 ENCOUNTER — TELEPHONE (OUTPATIENT)
Dept: ADMISSION | Facility: HOSPITAL | Age: 64
End: 2024-01-24
Payer: COMMERCIAL

## 2024-01-24 NOTE — TELEPHONE ENCOUNTER
Patient called triage line states he completed MR imaging for thoracic, lumbar, cervical and brain 1/12/2024. However scheduling is calling to schedule imaging.     Appears active orders are from Dr. Sandhu, however orders that were completed were ordered by a Dr. Carmel Arceo.     Message sent to provider team, to inquire if duplicate orders and okay to cancel.

## 2024-01-31 PROBLEM — C34.90 SCLC (SMALL CELL LUNG CARCINOMA) (MULTI): Status: ACTIVE | Noted: 2024-01-31

## 2024-02-06 ENCOUNTER — LAB (OUTPATIENT)
Dept: LAB | Facility: LAB | Age: 64
End: 2024-02-06
Payer: COMMERCIAL

## 2024-02-06 ENCOUNTER — TELEMEDICINE (OUTPATIENT)
Dept: HEMATOLOGY/ONCOLOGY | Facility: HOSPITAL | Age: 64
End: 2024-02-06
Payer: COMMERCIAL

## 2024-02-06 DIAGNOSIS — C34.90 SCLC (SMALL CELL LUNG CARCINOMA) (MULTI): ICD-10-CM

## 2024-02-06 DIAGNOSIS — R91.8 MASS OF LEFT LUNG: Primary | ICD-10-CM

## 2024-02-06 DIAGNOSIS — R91.8 MASS OF LEFT LUNG: ICD-10-CM

## 2024-02-06 LAB
ALBUMIN SERPL BCP-MCNC: 4.2 G/DL (ref 3.4–5)
ALP SERPL-CCNC: 78 U/L (ref 33–136)
ALT SERPL W P-5'-P-CCNC: 13 U/L (ref 10–52)
ANION GAP SERPL CALC-SCNC: 19 MMOL/L (ref 10–20)
AST SERPL W P-5'-P-CCNC: 16 U/L (ref 9–39)
BASOPHILS # BLD AUTO: 0.1 X10*3/UL (ref 0–0.1)
BASOPHILS NFR BLD AUTO: 1.2 %
BILIRUB SERPL-MCNC: 0.3 MG/DL (ref 0–1.2)
BUN SERPL-MCNC: 10 MG/DL (ref 6–23)
CALCIUM SERPL-MCNC: 9.8 MG/DL (ref 8.6–10.3)
CHLORIDE SERPL-SCNC: 97 MMOL/L (ref 98–107)
CO2 SERPL-SCNC: 26 MMOL/L (ref 21–32)
CREAT SERPL-MCNC: 0.78 MG/DL (ref 0.5–1.3)
EGFRCR SERPLBLD CKD-EPI 2021: >90 ML/MIN/1.73M*2
EOSINOPHIL # BLD AUTO: 0.17 X10*3/UL (ref 0–0.7)
EOSINOPHIL NFR BLD AUTO: 2 %
ERYTHROCYTE [DISTWIDTH] IN BLOOD BY AUTOMATED COUNT: 14.4 % (ref 11.5–14.5)
GLUCOSE SERPL-MCNC: 91 MG/DL (ref 74–99)
HCT VFR BLD AUTO: 44.7 % (ref 41–52)
HGB BLD-MCNC: 14.5 G/DL (ref 13.5–17.5)
IMM GRANULOCYTES # BLD AUTO: 0.14 X10*3/UL (ref 0–0.7)
IMM GRANULOCYTES NFR BLD AUTO: 1.6 % (ref 0–0.9)
LYMPHOCYTES # BLD AUTO: 2.7 X10*3/UL (ref 1.2–4.8)
LYMPHOCYTES NFR BLD AUTO: 31.8 %
MCH RBC QN AUTO: 31 PG (ref 26–34)
MCHC RBC AUTO-ENTMCNC: 32.4 G/DL (ref 32–36)
MCV RBC AUTO: 96 FL (ref 80–100)
MONOCYTES # BLD AUTO: 0.81 X10*3/UL (ref 0.1–1)
MONOCYTES NFR BLD AUTO: 9.5 %
NEUTROPHILS # BLD AUTO: 4.58 X10*3/UL (ref 1.2–7.7)
NEUTROPHILS NFR BLD AUTO: 53.9 %
NRBC BLD-RTO: 0 /100 WBCS (ref 0–0)
PLATELET # BLD AUTO: 563 X10*3/UL (ref 150–450)
POTASSIUM SERPL-SCNC: 4.7 MMOL/L (ref 3.5–5.3)
PROT SERPL-MCNC: 6.8 G/DL (ref 6.4–8.2)
RBC # BLD AUTO: 4.68 X10*6/UL (ref 4.5–5.9)
SODIUM SERPL-SCNC: 137 MMOL/L (ref 136–145)
TSH SERPL-ACNC: 1.96 MIU/L (ref 0.44–3.98)
WBC # BLD AUTO: 8.5 X10*3/UL (ref 4.4–11.3)

## 2024-02-06 PROCEDURE — 82533 TOTAL CORTISOL: CPT

## 2024-02-06 PROCEDURE — 1036F TOBACCO NON-USER: CPT | Performed by: NURSE PRACTITIONER

## 2024-02-06 PROCEDURE — 36415 COLL VENOUS BLD VENIPUNCTURE: CPT

## 2024-02-06 PROCEDURE — 80053 COMPREHEN METABOLIC PANEL: CPT

## 2024-02-06 PROCEDURE — 85025 COMPLETE CBC W/AUTO DIFF WBC: CPT

## 2024-02-06 PROCEDURE — 99212 OFFICE O/P EST SF 10 MIN: CPT | Performed by: NURSE PRACTITIONER

## 2024-02-06 PROCEDURE — 84443 ASSAY THYROID STIM HORMONE: CPT
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
## 2024-02-07 ENCOUNTER — INFUSION (OUTPATIENT)
Dept: HEMATOLOGY/ONCOLOGY | Facility: CLINIC | Age: 64
End: 2024-02-07
Payer: COMMERCIAL

## 2024-02-07 ENCOUNTER — APPOINTMENT (OUTPATIENT)
Dept: HEMATOLOGY/ONCOLOGY | Facility: CLINIC | Age: 64
End: 2024-02-07
Payer: COMMERCIAL

## 2024-02-07 VITALS
HEART RATE: 88 BPM | TEMPERATURE: 97.9 F | DIASTOLIC BLOOD PRESSURE: 78 MMHG | WEIGHT: 181.99 LBS | BODY MASS INDEX: 25.45 KG/M2 | OXYGEN SATURATION: 94 % | SYSTOLIC BLOOD PRESSURE: 120 MMHG

## 2024-02-07 DIAGNOSIS — R91.8 MASS OF LEFT LUNG: Primary | ICD-10-CM

## 2024-02-07 DIAGNOSIS — C34.90 SCLC (SMALL CELL LUNG CARCINOMA) (MULTI): ICD-10-CM

## 2024-02-07 DIAGNOSIS — R91.8 MASS OF LEFT LUNG: ICD-10-CM

## 2024-02-07 LAB — CORTIS AM PEAK SERPL-MSCNC: 13.2 UG/DL (ref 5–20)

## 2024-02-07 PROCEDURE — 96417 CHEMO IV INFUS EACH ADDL SEQ: CPT

## 2024-02-07 PROCEDURE — 2500000004 HC RX 250 GENERAL PHARMACY W/ HCPCS (ALT 636 FOR OP/ED): Performed by: INTERNAL MEDICINE

## 2024-02-07 PROCEDURE — 96415 CHEMO IV INFUSION ADDL HR: CPT

## 2024-02-07 PROCEDURE — 96375 TX/PRO/DX INJ NEW DRUG ADDON: CPT | Mod: INF

## 2024-02-07 PROCEDURE — 96413 CHEMO IV INFUSION 1 HR: CPT

## 2024-02-07 RX ORDER — EPINEPHRINE 0.3 MG/.3ML
0.3 INJECTION SUBCUTANEOUS EVERY 5 MIN PRN
Status: DISCONTINUED | OUTPATIENT
Start: 2024-02-07 | End: 2024-02-07 | Stop reason: HOSPADM

## 2024-02-07 RX ORDER — DIPHENHYDRAMINE HYDROCHLORIDE 50 MG/ML
50 INJECTION INTRAMUSCULAR; INTRAVENOUS AS NEEDED
Status: COMPLETED | OUTPATIENT
Start: 2024-02-07 | End: 2024-02-07

## 2024-02-07 RX ORDER — PROCHLORPERAZINE MALEATE 10 MG
10 TABLET ORAL EVERY 6 HOURS PRN
Status: CANCELLED | OUTPATIENT
Start: 2024-02-07

## 2024-02-07 RX ORDER — ALBUTEROL SULFATE 0.83 MG/ML
3 SOLUTION RESPIRATORY (INHALATION) AS NEEDED
Status: DISCONTINUED | OUTPATIENT
Start: 2024-02-07 | End: 2024-02-07 | Stop reason: HOSPADM

## 2024-02-07 RX ORDER — DEXAMETHASONE SODIUM PHOSPHATE 4 MG/ML
8 INJECTION, SOLUTION INTRA-ARTICULAR; INTRALESIONAL; INTRAMUSCULAR; INTRAVENOUS; SOFT TISSUE ONCE
Status: CANCELLED | OUTPATIENT
Start: 2024-02-29

## 2024-02-07 RX ORDER — PROCHLORPERAZINE MALEATE 10 MG
10 TABLET ORAL EVERY 6 HOURS PRN
Status: CANCELLED | OUTPATIENT
Start: 2024-02-08

## 2024-02-07 RX ORDER — PROCHLORPERAZINE MALEATE 10 MG
10 TABLET ORAL EVERY 6 HOURS PRN
Status: CANCELLED | OUTPATIENT
Start: 2024-03-04

## 2024-02-07 RX ORDER — ALBUTEROL SULFATE 0.83 MG/ML
3 SOLUTION RESPIRATORY (INHALATION) AS NEEDED
Status: CANCELLED | OUTPATIENT
Start: 2024-03-27

## 2024-02-07 RX ORDER — FAMOTIDINE 10 MG/ML
20 INJECTION INTRAVENOUS ONCE AS NEEDED
Status: CANCELLED | OUTPATIENT
Start: 2024-02-07

## 2024-02-07 RX ORDER — PROCHLORPERAZINE EDISYLATE 5 MG/ML
10 INJECTION INTRAMUSCULAR; INTRAVENOUS EVERY 6 HOURS PRN
Status: CANCELLED | OUTPATIENT
Start: 2024-02-07

## 2024-02-07 RX ORDER — ONDANSETRON HYDROCHLORIDE 2 MG/ML
8 INJECTION, SOLUTION INTRAVENOUS ONCE
Status: CANCELLED | OUTPATIENT
Start: 2024-02-09

## 2024-02-07 RX ORDER — FAMOTIDINE 10 MG/ML
20 INJECTION INTRAVENOUS ONCE AS NEEDED
Status: CANCELLED | OUTPATIENT
Start: 2024-02-09

## 2024-02-07 RX ORDER — ALBUTEROL SULFATE 0.83 MG/ML
3 SOLUTION RESPIRATORY (INHALATION) AS NEEDED
Status: CANCELLED | OUTPATIENT
Start: 2024-03-05

## 2024-02-07 RX ORDER — PROCHLORPERAZINE EDISYLATE 5 MG/ML
10 INJECTION INTRAMUSCULAR; INTRAVENOUS EVERY 6 HOURS PRN
Status: CANCELLED | OUTPATIENT
Start: 2024-02-09

## 2024-02-07 RX ORDER — PALONOSETRON 0.05 MG/ML
0.25 INJECTION, SOLUTION INTRAVENOUS ONCE
Status: COMPLETED | OUTPATIENT
Start: 2024-02-07 | End: 2024-02-07

## 2024-02-07 RX ORDER — ONDANSETRON HYDROCHLORIDE 2 MG/ML
8 INJECTION, SOLUTION INTRAVENOUS ONCE
Status: CANCELLED | OUTPATIENT
Start: 2024-03-06

## 2024-02-07 RX ORDER — PROCHLORPERAZINE EDISYLATE 5 MG/ML
10 INJECTION INTRAMUSCULAR; INTRAVENOUS EVERY 6 HOURS PRN
Status: CANCELLED | OUTPATIENT
Start: 2024-03-28

## 2024-02-07 RX ORDER — DEXAMETHASONE SODIUM PHOSPHATE 4 MG/ML
8 INJECTION, SOLUTION INTRA-ARTICULAR; INTRALESIONAL; INTRAMUSCULAR; INTRAVENOUS; SOFT TISSUE ONCE
Status: CANCELLED | OUTPATIENT
Start: 2024-02-08

## 2024-02-07 RX ORDER — FAMOTIDINE 10 MG/ML
20 INJECTION INTRAVENOUS ONCE AS NEEDED
Status: CANCELLED | OUTPATIENT
Start: 2024-03-27

## 2024-02-07 RX ORDER — DIPHENHYDRAMINE HYDROCHLORIDE 50 MG/ML
50 INJECTION INTRAMUSCULAR; INTRAVENOUS AS NEEDED
Status: CANCELLED | OUTPATIENT
Start: 2024-03-29

## 2024-02-07 RX ORDER — ALBUTEROL SULFATE 0.83 MG/ML
3 SOLUTION RESPIRATORY (INHALATION) AS NEEDED
Status: CANCELLED | OUTPATIENT
Start: 2024-02-09

## 2024-02-07 RX ORDER — DIPHENHYDRAMINE HYDROCHLORIDE 50 MG/ML
50 INJECTION INTRAMUSCULAR; INTRAVENOUS AS NEEDED
Status: CANCELLED | OUTPATIENT
Start: 2024-02-08

## 2024-02-07 RX ORDER — DEXAMETHASONE SODIUM PHOSPHATE 4 MG/ML
8 INJECTION, SOLUTION INTRA-ARTICULAR; INTRALESIONAL; INTRAMUSCULAR; INTRAVENOUS; SOFT TISSUE ONCE
Status: CANCELLED | OUTPATIENT
Start: 2024-03-01

## 2024-02-07 RX ORDER — PROCHLORPERAZINE MALEATE 10 MG
10 TABLET ORAL EVERY 6 HOURS PRN
Status: DISCONTINUED | OUTPATIENT
Start: 2024-02-07 | End: 2024-02-07 | Stop reason: HOSPADM

## 2024-02-07 RX ORDER — ALBUTEROL SULFATE 0.83 MG/ML
3 SOLUTION RESPIRATORY (INHALATION) AS NEEDED
Status: CANCELLED | OUTPATIENT
Start: 2024-03-04

## 2024-02-07 RX ORDER — ALBUTEROL SULFATE 0.83 MG/ML
3 SOLUTION RESPIRATORY (INHALATION) AS NEEDED
Status: CANCELLED | OUTPATIENT
Start: 2024-02-08

## 2024-02-07 RX ORDER — FAMOTIDINE 10 MG/ML
20 INJECTION INTRAVENOUS ONCE AS NEEDED
Status: CANCELLED | OUTPATIENT
Start: 2024-03-05

## 2024-02-07 RX ORDER — EPINEPHRINE 0.3 MG/.3ML
0.3 INJECTION SUBCUTANEOUS EVERY 5 MIN PRN
Status: CANCELLED | OUTPATIENT
Start: 2024-02-09

## 2024-02-07 RX ORDER — EPINEPHRINE 0.3 MG/.3ML
0.3 INJECTION SUBCUTANEOUS EVERY 5 MIN PRN
Status: CANCELLED | OUTPATIENT
Start: 2024-03-28

## 2024-02-07 RX ORDER — EPINEPHRINE 0.3 MG/.3ML
0.3 INJECTION SUBCUTANEOUS EVERY 5 MIN PRN
Status: CANCELLED | OUTPATIENT
Start: 2024-03-05

## 2024-02-07 RX ORDER — FAMOTIDINE 10 MG/ML
20 INJECTION INTRAVENOUS ONCE AS NEEDED
Status: CANCELLED | OUTPATIENT
Start: 2024-03-28

## 2024-02-07 RX ORDER — FAMOTIDINE 10 MG/ML
20 INJECTION INTRAVENOUS ONCE AS NEEDED
Status: CANCELLED | OUTPATIENT
Start: 2024-03-04

## 2024-02-07 RX ORDER — PROCHLORPERAZINE MALEATE 10 MG
10 TABLET ORAL EVERY 6 HOURS PRN
Status: CANCELLED | OUTPATIENT
Start: 2024-03-27

## 2024-02-07 RX ORDER — EPINEPHRINE 0.3 MG/.3ML
0.3 INJECTION SUBCUTANEOUS EVERY 5 MIN PRN
Status: CANCELLED | OUTPATIENT
Start: 2024-03-27

## 2024-02-07 RX ORDER — DEXAMETHASONE SODIUM PHOSPHATE 4 MG/ML
8 INJECTION, SOLUTION INTRA-ARTICULAR; INTRALESIONAL; INTRAMUSCULAR; INTRAVENOUS; SOFT TISSUE ONCE
Status: CANCELLED | OUTPATIENT
Start: 2024-02-09

## 2024-02-07 RX ORDER — ALBUTEROL SULFATE 0.83 MG/ML
3 SOLUTION RESPIRATORY (INHALATION) AS NEEDED
Status: CANCELLED | OUTPATIENT
Start: 2024-03-29

## 2024-02-07 RX ORDER — PROCHLORPERAZINE EDISYLATE 5 MG/ML
10 INJECTION INTRAMUSCULAR; INTRAVENOUS EVERY 6 HOURS PRN
Status: DISCONTINUED | OUTPATIENT
Start: 2024-02-07 | End: 2024-02-07 | Stop reason: HOSPADM

## 2024-02-07 RX ORDER — ONDANSETRON HYDROCHLORIDE 2 MG/ML
8 INJECTION, SOLUTION INTRAVENOUS ONCE
Status: CANCELLED | OUTPATIENT
Start: 2024-03-29

## 2024-02-07 RX ORDER — PROCHLORPERAZINE MALEATE 10 MG
10 TABLET ORAL EVERY 6 HOURS PRN
Status: CANCELLED | OUTPATIENT
Start: 2024-02-09

## 2024-02-07 RX ORDER — PROCHLORPERAZINE MALEATE 10 MG
10 TABLET ORAL EVERY 6 HOURS PRN
Status: CANCELLED | OUTPATIENT
Start: 2024-03-28

## 2024-02-07 RX ORDER — PROCHLORPERAZINE MALEATE 10 MG
10 TABLET ORAL EVERY 6 HOURS PRN
Status: CANCELLED | OUTPATIENT
Start: 2024-03-05

## 2024-02-07 RX ORDER — PROCHLORPERAZINE EDISYLATE 5 MG/ML
10 INJECTION INTRAMUSCULAR; INTRAVENOUS EVERY 6 HOURS PRN
Status: CANCELLED | OUTPATIENT
Start: 2024-02-08

## 2024-02-07 RX ORDER — DIPHENHYDRAMINE HYDROCHLORIDE 50 MG/ML
50 INJECTION INTRAMUSCULAR; INTRAVENOUS AS NEEDED
Status: CANCELLED | OUTPATIENT
Start: 2024-03-06

## 2024-02-07 RX ORDER — PALONOSETRON 0.05 MG/ML
0.25 INJECTION, SOLUTION INTRAVENOUS ONCE
Status: CANCELLED | OUTPATIENT
Start: 2024-03-27

## 2024-02-07 RX ORDER — PROCHLORPERAZINE EDISYLATE 5 MG/ML
10 INJECTION INTRAMUSCULAR; INTRAVENOUS EVERY 6 HOURS PRN
Status: CANCELLED | OUTPATIENT
Start: 2024-03-04

## 2024-02-07 RX ORDER — DIPHENHYDRAMINE HYDROCHLORIDE 50 MG/ML
50 INJECTION INTRAMUSCULAR; INTRAVENOUS AS NEEDED
Status: CANCELLED | OUTPATIENT
Start: 2024-03-28

## 2024-02-07 RX ORDER — PALONOSETRON 0.05 MG/ML
0.25 INJECTION, SOLUTION INTRAVENOUS ONCE
Status: CANCELLED | OUTPATIENT
Start: 2024-02-07

## 2024-02-07 RX ORDER — DEXAMETHASONE SODIUM PHOSPHATE 4 MG/ML
8 INJECTION, SOLUTION INTRA-ARTICULAR; INTRALESIONAL; INTRAMUSCULAR; INTRAVENOUS; SOFT TISSUE ONCE
Status: CANCELLED | OUTPATIENT
Start: 2024-03-21

## 2024-02-07 RX ORDER — DIPHENHYDRAMINE HYDROCHLORIDE 50 MG/ML
50 INJECTION INTRAMUSCULAR; INTRAVENOUS AS NEEDED
Status: CANCELLED | OUTPATIENT
Start: 2024-03-04

## 2024-02-07 RX ORDER — ALBUTEROL SULFATE 0.83 MG/ML
3 SOLUTION RESPIRATORY (INHALATION) AS NEEDED
Status: CANCELLED | OUTPATIENT
Start: 2024-02-07

## 2024-02-07 RX ORDER — FAMOTIDINE 10 MG/ML
20 INJECTION INTRAVENOUS ONCE AS NEEDED
Status: COMPLETED | OUTPATIENT
Start: 2024-02-07 | End: 2024-02-07

## 2024-02-07 RX ORDER — DEXAMETHASONE SODIUM PHOSPHATE 4 MG/ML
8 INJECTION, SOLUTION INTRA-ARTICULAR; INTRALESIONAL; INTRAMUSCULAR; INTRAVENOUS; SOFT TISSUE ONCE
Status: CANCELLED | OUTPATIENT
Start: 2024-03-22

## 2024-02-07 RX ORDER — PROCHLORPERAZINE EDISYLATE 5 MG/ML
10 INJECTION INTRAMUSCULAR; INTRAVENOUS EVERY 6 HOURS PRN
Status: CANCELLED | OUTPATIENT
Start: 2024-03-06

## 2024-02-07 RX ORDER — FAMOTIDINE 10 MG/ML
20 INJECTION INTRAVENOUS ONCE AS NEEDED
Status: CANCELLED | OUTPATIENT
Start: 2024-02-08

## 2024-02-07 RX ORDER — PROCHLORPERAZINE EDISYLATE 5 MG/ML
10 INJECTION INTRAMUSCULAR; INTRAVENOUS EVERY 6 HOURS PRN
Status: CANCELLED | OUTPATIENT
Start: 2024-03-27

## 2024-02-07 RX ORDER — EPINEPHRINE 0.3 MG/.3ML
0.3 INJECTION SUBCUTANEOUS EVERY 5 MIN PRN
Status: CANCELLED | OUTPATIENT
Start: 2024-03-04

## 2024-02-07 RX ORDER — PALONOSETRON 0.05 MG/ML
0.25 INJECTION, SOLUTION INTRAVENOUS ONCE
Status: CANCELLED | OUTPATIENT
Start: 2024-03-04

## 2024-02-07 RX ORDER — EPINEPHRINE 0.3 MG/.3ML
0.3 INJECTION SUBCUTANEOUS EVERY 5 MIN PRN
Status: CANCELLED | OUTPATIENT
Start: 2024-02-08

## 2024-02-07 RX ORDER — DIPHENHYDRAMINE HYDROCHLORIDE 50 MG/ML
50 INJECTION INTRAMUSCULAR; INTRAVENOUS AS NEEDED
Status: CANCELLED | OUTPATIENT
Start: 2024-03-27

## 2024-02-07 RX ORDER — DIPHENHYDRAMINE HYDROCHLORIDE 50 MG/ML
50 INJECTION INTRAMUSCULAR; INTRAVENOUS AS NEEDED
Status: CANCELLED | OUTPATIENT
Start: 2024-03-05

## 2024-02-07 RX ORDER — ALBUTEROL SULFATE 0.83 MG/ML
3 SOLUTION RESPIRATORY (INHALATION) AS NEEDED
Status: CANCELLED | OUTPATIENT
Start: 2024-03-28

## 2024-02-07 RX ORDER — PROCHLORPERAZINE EDISYLATE 5 MG/ML
10 INJECTION INTRAMUSCULAR; INTRAVENOUS EVERY 6 HOURS PRN
Status: CANCELLED | OUTPATIENT
Start: 2024-03-05

## 2024-02-07 RX ORDER — EPINEPHRINE 0.3 MG/.3ML
0.3 INJECTION SUBCUTANEOUS EVERY 5 MIN PRN
Status: CANCELLED | OUTPATIENT
Start: 2024-03-29

## 2024-02-07 RX ORDER — PROCHLORPERAZINE MALEATE 10 MG
10 TABLET ORAL EVERY 6 HOURS PRN
Status: CANCELLED | OUTPATIENT
Start: 2024-03-06

## 2024-02-07 RX ORDER — FAMOTIDINE 10 MG/ML
20 INJECTION INTRAVENOUS ONCE AS NEEDED
Status: CANCELLED | OUTPATIENT
Start: 2024-03-06

## 2024-02-07 RX ORDER — PROCHLORPERAZINE EDISYLATE 5 MG/ML
10 INJECTION INTRAMUSCULAR; INTRAVENOUS EVERY 6 HOURS PRN
Status: CANCELLED | OUTPATIENT
Start: 2024-03-29

## 2024-02-07 RX ORDER — DIPHENHYDRAMINE HYDROCHLORIDE 50 MG/ML
50 INJECTION INTRAMUSCULAR; INTRAVENOUS AS NEEDED
Status: CANCELLED | OUTPATIENT
Start: 2024-02-09

## 2024-02-07 RX ORDER — FAMOTIDINE 10 MG/ML
20 INJECTION INTRAVENOUS ONCE AS NEEDED
Status: CANCELLED | OUTPATIENT
Start: 2024-03-29

## 2024-02-07 RX ORDER — DIPHENHYDRAMINE HYDROCHLORIDE 50 MG/ML
50 INJECTION INTRAMUSCULAR; INTRAVENOUS AS NEEDED
Status: CANCELLED | OUTPATIENT
Start: 2024-02-07

## 2024-02-07 RX ORDER — ALBUTEROL SULFATE 0.83 MG/ML
3 SOLUTION RESPIRATORY (INHALATION) AS NEEDED
Status: CANCELLED | OUTPATIENT
Start: 2024-03-06

## 2024-02-07 RX ORDER — EPINEPHRINE 0.3 MG/.3ML
0.3 INJECTION SUBCUTANEOUS EVERY 5 MIN PRN
Status: CANCELLED | OUTPATIENT
Start: 2024-02-07

## 2024-02-07 RX ORDER — EPINEPHRINE 0.3 MG/.3ML
0.3 INJECTION SUBCUTANEOUS EVERY 5 MIN PRN
Status: CANCELLED | OUTPATIENT
Start: 2024-03-06

## 2024-02-07 RX ORDER — PROCHLORPERAZINE MALEATE 10 MG
10 TABLET ORAL EVERY 6 HOURS PRN
Status: CANCELLED | OUTPATIENT
Start: 2024-03-29

## 2024-02-07 RX ADMIN — METHYLPREDNISOLONE SODIUM SUCCINATE 40 MG: 40 INJECTION, POWDER, FOR SOLUTION INTRAMUSCULAR; INTRAVENOUS at 12:04

## 2024-02-07 RX ADMIN — ATEZOLIZUMAB 1200 MG: 1200 INJECTION, SOLUTION INTRAVENOUS at 09:37

## 2024-02-07 RX ADMIN — SODIUM CHLORIDE 200 MG: 9 INJECTION, SOLUTION INTRAVENOUS at 11:52

## 2024-02-07 RX ADMIN — CARBOPLATIN 671 MG: 10 INJECTION, SOLUTION INTRAVENOUS at 10:26

## 2024-02-07 RX ADMIN — FAMOTIDINE 20 MG: 10 INJECTION INTRAVENOUS at 12:00

## 2024-02-07 RX ADMIN — DIPHENHYDRAMINE HYDROCHLORIDE 50 MG: 50 INJECTION, SOLUTION INTRAMUSCULAR; INTRAVENOUS at 12:01

## 2024-02-07 RX ADMIN — PALONOSETRON HYDROCHLORIDE 250 MCG: 0.25 INJECTION INTRAVENOUS at 09:04

## 2024-02-07 RX ADMIN — DEXAMETHASONE SODIUM PHOSPHATE 12 MG: 10 INJECTION, SOLUTION INTRAMUSCULAR; INTRAVENOUS at 09:05

## 2024-02-07 ASSESSMENT — PAIN SCALES - GENERAL: PAINLEVEL: 0-NO PAIN

## 2024-02-07 NOTE — PROGRESS NOTES
"Adverse Event Note     Name:Mike Franco  : 1960  MRN: [id number]      Adverse Event:  Medication: Etoposide  Administered Date/Time: 2024 at 1155  Reactions/Symptoms Started Time: 1200   Symptoms: (check all that apply)   [] Back Pain   [] Erythema Face     [] Hypotension     [x] Rash/Rigors [x] Other   [] Bleeding    [] Erythema Hands  [] Itching  [] Swelling/Edema [] Unknown   [] Chest Pain [] Hives/Urticaria     [] Low platelet Ct  [] Syncope   [] Cytopenia  [] Hypertension       [] Neutropenia    Severity: Moderate   Provider Notified: Yes   Medications Given(Add all medications to the chart via , or treatment plan)   [] Acetaminophen-Tylenol       [x] Famotidine-Pepcid  [] Nitroglycerine-NTG   [] Albuterol                               [] Hydrocortisone       [] Ondansetron-Zofran   [x] Diphenhydramine-Benadryl  [] Lorazepam-Ativan  [] Naloxone-Narcan   [] Epinephrine-Epi                     [x] Methylprednisone   Additional Details/ Comments:   Patient reported tunnel vision, ringing ears, shortness of breath, feeling like he was \"going to black out\". Rigoring noted. Patient was 5 minutes into etoposide infusion  Medication stopped, NS bolus started  /94, HR 93, SpO2 84%- placed on 2 L NC  1201- 50 mg benedryl, 40 mg solu-medrol given  1203- /75, HR 84, SpO2 91% on 2L NC. Patient reporting he is \"starting to feel better\". Dr Sandhu at chairside  1204- 20 mg pepcid administered  1205- /76, BP 94, SpO2 93% on 2L  1213- 119/74, HR 91 SpO2 93% on 2L  1215- SpO2 93% on RA. Patient reports he is feeling better other than some mild shortness of breath. Per Dr. Sandhu, etoposide to be run at half rate.       "

## 2024-02-07 NOTE — SIGNIFICANT EVENT
02/07/24 0754   Prechemo Checklist   Has the patient been in the hospital, ED, or urgent care since last date of service No   Chemo/Immuno Consent Signed Yes   Protocol/Indications Verified Yes   Confirmed to previous date/time of medication Yes   Compared to previous dose Yes   All medications are dated accurately Yes   Pregnancy Test Negative Not applicable   Parameters Met Yes   BSA/Weight-Height Verified Yes   Dose Calculations Verified Yes

## 2024-02-08 ENCOUNTER — APPOINTMENT (OUTPATIENT)
Dept: HEMATOLOGY/ONCOLOGY | Facility: CLINIC | Age: 64
End: 2024-02-08
Payer: COMMERCIAL

## 2024-02-08 ENCOUNTER — INFUSION (OUTPATIENT)
Dept: HEMATOLOGY/ONCOLOGY | Facility: CLINIC | Age: 64
End: 2024-02-08
Payer: COMMERCIAL

## 2024-02-08 VITALS
RESPIRATION RATE: 18 BRPM | SYSTOLIC BLOOD PRESSURE: 139 MMHG | TEMPERATURE: 97.2 F | DIASTOLIC BLOOD PRESSURE: 80 MMHG | OXYGEN SATURATION: 90 %

## 2024-02-08 DIAGNOSIS — R91.8 MASS OF LEFT LUNG: ICD-10-CM

## 2024-02-08 DIAGNOSIS — C34.90 SCLC (SMALL CELL LUNG CARCINOMA) (MULTI): ICD-10-CM

## 2024-02-08 PROCEDURE — 96376 TX/PRO/DX INJ SAME DRUG ADON: CPT

## 2024-02-08 PROCEDURE — 96375 TX/PRO/DX INJ NEW DRUG ADDON: CPT | Mod: INF

## 2024-02-08 PROCEDURE — 2500000004 HC RX 250 GENERAL PHARMACY W/ HCPCS (ALT 636 FOR OP/ED): Performed by: INTERNAL MEDICINE

## 2024-02-08 PROCEDURE — 96413 CHEMO IV INFUSION 1 HR: CPT

## 2024-02-08 RX ORDER — DIPHENHYDRAMINE HYDROCHLORIDE 50 MG/ML
50 INJECTION INTRAMUSCULAR; INTRAVENOUS ONCE
Status: CANCELLED
Start: 2024-03-27 | End: 2024-03-20

## 2024-02-08 RX ORDER — DIPHENHYDRAMINE HYDROCHLORIDE 50 MG/ML
50 INJECTION INTRAMUSCULAR; INTRAVENOUS ONCE
Status: CANCELLED
Start: 2024-02-09 | End: 2024-02-09

## 2024-02-08 RX ORDER — EPINEPHRINE 0.3 MG/.3ML
0.3 INJECTION SUBCUTANEOUS EVERY 5 MIN PRN
Status: DISCONTINUED | OUTPATIENT
Start: 2024-02-08 | End: 2024-02-08 | Stop reason: HOSPADM

## 2024-02-08 RX ORDER — DEXAMETHASONE SODIUM PHOSPHATE 4 MG/ML
8 INJECTION, SOLUTION INTRA-ARTICULAR; INTRALESIONAL; INTRAMUSCULAR; INTRAVENOUS; SOFT TISSUE ONCE
Status: CANCELLED | OUTPATIENT
Start: 2024-03-28

## 2024-02-08 RX ORDER — DEXAMETHASONE SODIUM PHOSPHATE 4 MG/ML
8 INJECTION, SOLUTION INTRA-ARTICULAR; INTRALESIONAL; INTRAMUSCULAR; INTRAVENOUS; SOFT TISSUE ONCE
Status: CANCELLED | OUTPATIENT
Start: 2024-02-09

## 2024-02-08 RX ORDER — FAMOTIDINE 10 MG/ML
20 INJECTION INTRAVENOUS ONCE
Status: CANCELLED
Start: 2024-02-08

## 2024-02-08 RX ORDER — DIPHENHYDRAMINE HYDROCHLORIDE 50 MG/ML
25 INJECTION INTRAMUSCULAR; INTRAVENOUS ONCE
Status: COMPLETED | OUTPATIENT
Start: 2024-02-08 | End: 2024-02-08

## 2024-02-08 RX ORDER — DIPHENHYDRAMINE HYDROCHLORIDE 50 MG/ML
50 INJECTION INTRAMUSCULAR; INTRAVENOUS ONCE
Status: COMPLETED | OUTPATIENT
Start: 2024-02-08 | End: 2024-02-08

## 2024-02-08 RX ORDER — DEXAMETHASONE SODIUM PHOSPHATE 4 MG/ML
8 INJECTION, SOLUTION INTRA-ARTICULAR; INTRALESIONAL; INTRAMUSCULAR; INTRAVENOUS; SOFT TISSUE ONCE
Status: CANCELLED | OUTPATIENT
Start: 2024-03-29

## 2024-02-08 RX ORDER — DIPHENHYDRAMINE HYDROCHLORIDE 50 MG/ML
50 INJECTION INTRAMUSCULAR; INTRAVENOUS ONCE
Status: CANCELLED
Start: 2024-02-08 | End: 2024-02-08

## 2024-02-08 RX ORDER — DIPHENHYDRAMINE HYDROCHLORIDE 50 MG/ML
50 INJECTION INTRAMUSCULAR; INTRAVENOUS AS NEEDED
Status: DISCONTINUED | OUTPATIENT
Start: 2024-02-08 | End: 2024-02-08 | Stop reason: HOSPADM

## 2024-02-08 RX ORDER — DIPHENHYDRAMINE HYDROCHLORIDE 50 MG/ML
50 INJECTION INTRAMUSCULAR; INTRAVENOUS ONCE
Status: CANCELLED
Start: 2024-03-28 | End: 2024-03-21

## 2024-02-08 RX ORDER — DIPHENHYDRAMINE HYDROCHLORIDE 50 MG/ML
50 INJECTION INTRAMUSCULAR; INTRAVENOUS ONCE
Status: CANCELLED
Start: 2024-03-06 | End: 2024-03-01

## 2024-02-08 RX ORDER — FAMOTIDINE 10 MG/ML
20 INJECTION INTRAVENOUS ONCE AS NEEDED
Status: COMPLETED | OUTPATIENT
Start: 2024-02-08 | End: 2024-02-08

## 2024-02-08 RX ORDER — DIPHENHYDRAMINE HYDROCHLORIDE 50 MG/ML
25 INJECTION INTRAMUSCULAR; INTRAVENOUS ONCE
Status: CANCELLED
Start: 2024-02-08

## 2024-02-08 RX ORDER — ALBUTEROL SULFATE 0.83 MG/ML
3 SOLUTION RESPIRATORY (INHALATION) AS NEEDED
Status: DISCONTINUED | OUTPATIENT
Start: 2024-02-08 | End: 2024-02-08 | Stop reason: HOSPADM

## 2024-02-08 RX ORDER — FAMOTIDINE 10 MG/ML
20 INJECTION INTRAVENOUS ONCE
Status: COMPLETED | OUTPATIENT
Start: 2024-02-08 | End: 2024-02-08

## 2024-02-08 RX ORDER — DIPHENHYDRAMINE HYDROCHLORIDE 50 MG/ML
50 INJECTION INTRAMUSCULAR; INTRAVENOUS ONCE
Status: CANCELLED
Start: 2024-03-04 | End: 2024-02-28

## 2024-02-08 RX ORDER — PROCHLORPERAZINE MALEATE 10 MG
10 TABLET ORAL EVERY 6 HOURS PRN
Status: DISCONTINUED | OUTPATIENT
Start: 2024-02-08 | End: 2024-02-08 | Stop reason: HOSPADM

## 2024-02-08 RX ORDER — DEXAMETHASONE SODIUM PHOSPHATE 4 MG/ML
8 INJECTION, SOLUTION INTRA-ARTICULAR; INTRALESIONAL; INTRAMUSCULAR; INTRAVENOUS; SOFT TISSUE ONCE
Status: CANCELLED | OUTPATIENT
Start: 2024-03-06

## 2024-02-08 RX ORDER — PROCHLORPERAZINE EDISYLATE 5 MG/ML
10 INJECTION INTRAMUSCULAR; INTRAVENOUS EVERY 6 HOURS PRN
Status: DISCONTINUED | OUTPATIENT
Start: 2024-02-08 | End: 2024-02-08 | Stop reason: HOSPADM

## 2024-02-08 RX ORDER — DIPHENHYDRAMINE HYDROCHLORIDE 50 MG/ML
50 INJECTION INTRAMUSCULAR; INTRAVENOUS ONCE
Status: CANCELLED
Start: 2024-03-05 | End: 2024-02-29

## 2024-02-08 RX ORDER — DEXAMETHASONE SODIUM PHOSPHATE 4 MG/ML
8 INJECTION, SOLUTION INTRA-ARTICULAR; INTRALESIONAL; INTRAMUSCULAR; INTRAVENOUS; SOFT TISSUE ONCE
Status: CANCELLED | OUTPATIENT
Start: 2024-03-05

## 2024-02-08 RX ORDER — DIPHENHYDRAMINE HYDROCHLORIDE 50 MG/ML
50 INJECTION INTRAMUSCULAR; INTRAVENOUS ONCE
Status: CANCELLED
Start: 2024-03-29 | End: 2024-03-22

## 2024-02-08 RX ADMIN — FAMOTIDINE 20 MG: 10 INJECTION INTRAVENOUS at 14:35

## 2024-02-08 RX ADMIN — DEXAMETHASONE SODIUM PHOSPHATE 12 MG: 10 INJECTION, SOLUTION INTRAMUSCULAR; INTRAVENOUS at 11:29

## 2024-02-08 RX ADMIN — FAMOTIDINE 20 MG: 10 INJECTION INTRAVENOUS at 12:15

## 2024-02-08 RX ADMIN — ETOPOSIDE PHOSPHATE 195 MG: 100 INJECTION, POWDER, LYOPHILIZED, FOR SOLUTION INTRAVENOUS at 15:11

## 2024-02-08 RX ADMIN — DIPHENHYDRAMINE HYDROCHLORIDE 25 MG: 50 INJECTION, SOLUTION INTRAMUSCULAR; INTRAVENOUS at 14:35

## 2024-02-08 RX ADMIN — SODIUM CHLORIDE 200 MG: 9 INJECTION, SOLUTION INTRAVENOUS at 12:01

## 2024-02-08 RX ADMIN — DIPHENHYDRAMINE HYDROCHLORIDE 50 MG: 50 INJECTION INTRAMUSCULAR; INTRAVENOUS at 11:28

## 2024-02-08 ASSESSMENT — PAIN SCALES - GENERAL: PAINLEVEL: 0-NO PAIN

## 2024-02-09 ENCOUNTER — NUTRITION (OUTPATIENT)
Dept: HEMATOLOGY/ONCOLOGY | Facility: CLINIC | Age: 64
End: 2024-02-09
Payer: COMMERCIAL

## 2024-02-09 ENCOUNTER — INFUSION (OUTPATIENT)
Dept: HEMATOLOGY/ONCOLOGY | Facility: CLINIC | Age: 64
End: 2024-02-09
Payer: COMMERCIAL

## 2024-02-09 VITALS — HEIGHT: 71 IN | BODY MASS INDEX: 25.49 KG/M2 | WEIGHT: 182.1 LBS

## 2024-02-09 VITALS
RESPIRATION RATE: 18 BRPM | HEART RATE: 85 BPM | TEMPERATURE: 97.3 F | DIASTOLIC BLOOD PRESSURE: 73 MMHG | SYSTOLIC BLOOD PRESSURE: 132 MMHG | OXYGEN SATURATION: 98 %

## 2024-02-09 DIAGNOSIS — C34.90 SCLC (SMALL CELL LUNG CARCINOMA) (MULTI): ICD-10-CM

## 2024-02-09 DIAGNOSIS — R91.8 MASS OF LEFT LUNG: ICD-10-CM

## 2024-02-09 PROCEDURE — 2500000004 HC RX 250 GENERAL PHARMACY W/ HCPCS (ALT 636 FOR OP/ED): Performed by: INTERNAL MEDICINE

## 2024-02-09 PROCEDURE — 96413 CHEMO IV INFUSION 1 HR: CPT

## 2024-02-09 PROCEDURE — 96375 TX/PRO/DX INJ NEW DRUG ADDON: CPT | Mod: INF

## 2024-02-09 RX ORDER — FAMOTIDINE 10 MG/ML
20 INJECTION INTRAVENOUS ONCE AS NEEDED
Status: DISCONTINUED | OUTPATIENT
Start: 2024-02-09 | End: 2024-02-09 | Stop reason: HOSPADM

## 2024-02-09 RX ORDER — DIPHENHYDRAMINE HYDROCHLORIDE 50 MG/ML
50 INJECTION INTRAMUSCULAR; INTRAVENOUS AS NEEDED
Status: DISCONTINUED | OUTPATIENT
Start: 2024-02-09 | End: 2024-02-09 | Stop reason: HOSPADM

## 2024-02-09 RX ORDER — DEXAMETHASONE SODIUM PHOSPHATE 4 MG/ML
8 INJECTION, SOLUTION INTRA-ARTICULAR; INTRALESIONAL; INTRAMUSCULAR; INTRAVENOUS; SOFT TISSUE ONCE
Status: COMPLETED | OUTPATIENT
Start: 2024-02-09 | End: 2024-02-09

## 2024-02-09 RX ORDER — ONDANSETRON HYDROCHLORIDE 2 MG/ML
8 INJECTION, SOLUTION INTRAVENOUS ONCE
Status: COMPLETED | OUTPATIENT
Start: 2024-02-09 | End: 2024-02-09

## 2024-02-09 RX ORDER — ALBUTEROL SULFATE 0.83 MG/ML
3 SOLUTION RESPIRATORY (INHALATION) AS NEEDED
Status: DISCONTINUED | OUTPATIENT
Start: 2024-02-09 | End: 2024-02-09 | Stop reason: HOSPADM

## 2024-02-09 RX ORDER — PROCHLORPERAZINE EDISYLATE 5 MG/ML
10 INJECTION INTRAMUSCULAR; INTRAVENOUS EVERY 6 HOURS PRN
Status: DISCONTINUED | OUTPATIENT
Start: 2024-02-09 | End: 2024-02-09 | Stop reason: HOSPADM

## 2024-02-09 RX ORDER — EPINEPHRINE 0.3 MG/.3ML
0.3 INJECTION SUBCUTANEOUS EVERY 5 MIN PRN
Status: DISCONTINUED | OUTPATIENT
Start: 2024-02-09 | End: 2024-02-09 | Stop reason: HOSPADM

## 2024-02-09 RX ORDER — PROCHLORPERAZINE MALEATE 10 MG
10 TABLET ORAL EVERY 6 HOURS PRN
Status: DISCONTINUED | OUTPATIENT
Start: 2024-02-09 | End: 2024-02-09 | Stop reason: HOSPADM

## 2024-02-09 RX ORDER — DIPHENHYDRAMINE HYDROCHLORIDE 50 MG/ML
50 INJECTION INTRAMUSCULAR; INTRAVENOUS ONCE
Status: COMPLETED | OUTPATIENT
Start: 2024-02-09 | End: 2024-02-09

## 2024-02-09 RX ADMIN — DEXAMETHASONE SODIUM PHOSPHATE 8 MG: 4 INJECTION INTRA-ARTICULAR; INTRALESIONAL; INTRAMUSCULAR; INTRAVENOUS; SOFT TISSUE at 12:57

## 2024-02-09 RX ADMIN — ETOPOSIDE PHOSPHATE 200 MG: 100 INJECTION, POWDER, LYOPHILIZED, FOR SOLUTION INTRAVENOUS at 13:34

## 2024-02-09 RX ADMIN — DIPHENHYDRAMINE HYDROCHLORIDE 50 MG: 50 INJECTION, SOLUTION INTRAMUSCULAR; INTRAVENOUS at 13:01

## 2024-02-09 RX ADMIN — ONDANSETRON 8 MG: 2 INJECTION INTRAMUSCULAR; INTRAVENOUS at 13:00

## 2024-02-09 ASSESSMENT — PAIN SCALES - GENERAL: PAINLEVEL: 0-NO PAIN

## 2024-02-09 NOTE — SIGNIFICANT EVENT
02/09/24 1211   Prechemo Checklist   Has the patient been in the hospital, ED, or urgent care since last date of service No   Chemo/Immuno Consent Signed Yes   Protocol/Indications Verified Yes   Confirmed to previous date/time of medication Yes   Compared to previous dose Yes   All medications are dated accurately Yes   Pregnancy Test Negative Not applicable   Parameters Met Yes  (no day 3 parameters)   BSA/Weight-Height Verified Yes   Dose Calculations Verified Yes
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
## 2024-02-09 NOTE — PROGRESS NOTES
Adverse Event Note     Name:Mike Franco  : 1960  MRN: [id number]      Adverse Event:  Medication: etoposide  Administered Date/Time: 2024 1201  Reactions/Symptoms Started Time: 1206   Symptoms: (check all that apply)   [] Back Pain   [] Erythema Face     [] Hypotension     [] Rash/Rigors [x] Other   [] Bleeding    [] Erythema Hands  [] Itching  [] Swelling/Edema [] Unknown   [] Chest Pain [] Hives/Urticaria     [] Low platelet Ct  [] Syncope   [] Cytopenia  [] Hypertension       [] Neutropenia    Severity: Mild   Provider Notified: Yes   Medications Given(Add all medications to the chart via , or treatment plan)   [] Acetaminophen-Tylenol       [x] Famotidine-Pepcid  [] Nitroglycerine-NTG   [] Albuterol                               [] Hydrocortisone       [] Ondansetron-Zofran   [] Diphenhydramine-Benadryl  [] Lorazepam-Ativan  [] Naloxone-Narcan   [] Epinephrine-Epi                     [] Methylprednisone   Additional Details/ Comments:   5 minutes in to infusion, patient began complaining of increased SOB. Etoposide infusion stopped, NS started, Isha, NP covering provider brought to chairside. /76, oxygen 89%. Placed on 4L NC oxygen. 1215 Pepcid IV given. Patient reported feeling back to baseline. Oxygen saturation improved. 98% on room air. Dr Sandhu and team notified. Plan to not rechallenge with this etoposide. Plan to give etoposide phosphate. Patient agreeable to plan.

## 2024-02-13 ENCOUNTER — HOSPITAL ENCOUNTER (OUTPATIENT)
Dept: RADIATION ONCOLOGY | Facility: HOSPITAL | Age: 64
Setting detail: RADIATION/ONCOLOGY SERIES
Discharge: HOME | End: 2024-02-13
Payer: COMMERCIAL

## 2024-02-13 VITALS
RESPIRATION RATE: 18 BRPM | TEMPERATURE: 96.8 F | OXYGEN SATURATION: 99 % | HEART RATE: 99 BPM | SYSTOLIC BLOOD PRESSURE: 106 MMHG | DIASTOLIC BLOOD PRESSURE: 71 MMHG

## 2024-02-13 DIAGNOSIS — C79.51 LUNG CANCER METASTATIC TO BONE (MULTI): Primary | ICD-10-CM

## 2024-02-13 DIAGNOSIS — C34.90 SCLC (SMALL CELL LUNG CARCINOMA) (MULTI): ICD-10-CM

## 2024-02-13 DIAGNOSIS — C34.90 LUNG CANCER METASTATIC TO BONE (MULTI): Primary | ICD-10-CM

## 2024-02-13 PROCEDURE — 99214 OFFICE O/P EST MOD 30 MIN: CPT | Mod: 27 | Performed by: STUDENT IN AN ORGANIZED HEALTH CARE EDUCATION/TRAINING PROGRAM

## 2024-02-13 PROCEDURE — 99204 OFFICE O/P NEW MOD 45 MIN: CPT | Performed by: STUDENT IN AN ORGANIZED HEALTH CARE EDUCATION/TRAINING PROGRAM

## 2024-02-13 ASSESSMENT — ENCOUNTER SYMPTOMS
EYE PROBLEMS: 1
APPETITE CHANGE: 1
CONFUSION: 1
ABDOMINAL DISTENTION: 1
WHEEZING: 1
OCCASIONAL FEELINGS OF UNSTEADINESS: 1
VOICE CHANGE: 1
SHORTNESS OF BREATH: 1
PALPITATIONS: 1
UNEXPECTED WEIGHT CHANGE: 1
CONSTIPATION: 1
HEADACHES: 1
EXTREMITY WEAKNESS: 1
NAUSEA: 1
FATIGUE: 1
NUMBNESS: 1
CHEST TIGHTNESS: 1
COUGH: 1
SLEEP DISTURBANCE: 1

## 2024-02-13 ASSESSMENT — COLUMBIA-SUICIDE SEVERITY RATING SCALE - C-SSRS
1. IN THE PAST MONTH, HAVE YOU WISHED YOU WERE DEAD OR WISHED YOU COULD GO TO SLEEP AND NOT WAKE UP?: NO
6. HAVE YOU EVER DONE ANYTHING, STARTED TO DO ANYTHING, OR PREPARED TO DO ANYTHING TO END YOUR LIFE?: NO
2. HAVE YOU ACTUALLY HAD ANY THOUGHTS OF KILLING YOURSELF?: NO

## 2024-02-13 ASSESSMENT — PATIENT HEALTH QUESTIONNAIRE - PHQ9
1. LITTLE INTEREST OR PLEASURE IN DOING THINGS: NOT AT ALL
SUM OF ALL RESPONSES TO PHQ9 QUESTIONS 1 AND 2: 0
2. FEELING DOWN, DEPRESSED OR HOPELESS: NOT AT ALL

## 2024-02-13 NOTE — PROGRESS NOTES
Radiation Oncology Outpatient Consult    Patient Name:  Mike Franco  MRN:  [id number]  :  1960    Referring Provider: No ref. provider found  Primary Care Provider: Real Nichols MD  Care Team: Patient Care Team:  Real Nichols MD as PCP - General  Real Nichols MD as PCP - [facility] ACJAMMIE PCP  Genesis Sandhu MD as Medical Oncologist (Hematology and Oncology)    Date of Service: 2024     SUBJECTIVE  History of Present Illness:  Mike Franco is a 64 y.o. male who was referred by No ref. provider found, for a consultation to the [facility] Department of Radiation Oncology.  He is presenting for evaluation and management of metastatic cancer.     Mr. Franco is a 64 year old male smoker who initially presented with left sided chest pain, and unintentional weight loss. Imaging showed a large left upper lobe mass. Pathology is concerning for small cell lung cancer. PET imaging and MRI brain showed bulky mediastinal disease and bony disease (specifically, the T-1 spine with posterior involvement). He is mainly asymptomatic from his metastatic disease. He has completed 2 cycles of chemoimmuno therapy.     Prior Radiotherapy:  No    Current Systemic Treatment:  Yes, describe: Carboplatin/Etoposide and Atezolizumab     Presence of Pacemaker or ICD:  No    Past Medical History:    Past Medical History:   Diagnosis Date    Cough variant asthma 2017    Cough variant asthma    Hyperlipidemia     Lung cancer (CMS/HCC)     Personal history of other specified conditions 2022    History of postnasal drip        Past Surgical History:    Past Surgical History:   Procedure Laterality Date    HERNIA REPAIR      IR INTERVENTION FILTER PLACEMENT      LEG AMPUTATION Right         Family History:  Cancer-related family history includes Cancer in his mother.    Social History:    Social History     Tobacco Use    Smoking status: Former     Types: Cigarettes     Quit  date:      Years since quittin.1    Smokeless tobacco: Never   Substance Use Topics    Alcohol use: Not Currently     Comment: social    Drug use: Never       Allergies:    Allergies   Allergen Reactions    Etoposide Shortness of breath and Other     Rigors  See adverse drug reaction note- 24    Fosaprepitant Hives, Shortness of breath and Nausea/vomiting        Medications:    Current Outpatient Medications:     albuterol 2.5 mg /3 mL (0.083 %) nebulizer solution, Take 3 mL by nebulization every 6 hours if needed (For respiratory rate GREATER THAN OR EQUAL TO 28 breaths/minute and/or wheezing.), Disp: 75 mL, Rfl: 11    albuterol 90 mcg/actuation inhaler, Inhale 2 puffs every 6 hours if needed for wheezing., Disp: 18 g, Rfl: 11    ALPRAZolam (Xanax) 0.25 mg tablet, Take 1 tablet (0.25 mg) by mouth 3 times a day as needed for anxiety., Disp: , Rfl:     dextromethorphan-guaifenesin (Mucinex DM)  mg 12 hr tablet, Take 1 tablet by mouth every 12 hours. Do not crush, chew, or split., Disp: , Rfl:     EPINEPHrine 0.3 mg/0.3 mL injection syringe, Inject 0.3 mL (0.3 mg) into the muscle. As Directed, Disp: , Rfl:     gabapentin (Neurontin) 100 mg capsule, Take 1 capsule (100 mg) by mouth as needed at bedtime (phantom limb pain)., Disp: , Rfl:     multivitamin tablet, Take 1 tablet by mouth once daily., Disp: , Rfl:     OLANZapine (ZyPREXA) 5 mg tablet, Take 1 tablet (5 mg) by mouth once daily at bedtime. For 4 days starting the evening of treatment, Disp: 4 tablet, Rfl: 3    ondansetron (Zofran) 8 mg tablet, Take 1 tablet (8 mg) by mouth every 8 hours if needed for nausea or vomiting., Disp: 30 tablet, Rfl: 5    oxyCODONE (Roxicodone) 5 mg immediate release tablet, Take 1 tablet (5 mg) by mouth every 6 hours if needed for moderate pain (4 - 6)., Disp: 30 tablet, Rfl: 0    prochlorperazine (Compazine) 10 mg tablet, Take 1 tablet (10 mg) by mouth every 6 hours if needed for nausea or vomiting., Disp: 30  tablet, Rfl: 5    rosuvastatin (Crestor) 5 mg tablet, Take 1 tablet (5 mg) by mouth once daily., Disp: , Rfl:       Review of Systems:  Review of Systems   Constitutional:  Positive for appetite change, fatigue and unexpected weight change.   HENT:   Positive for voice change.         Swelling in gums after 4th chemo tx  Raspy voice   Eyes:  Positive for eye problems.        Blurry vision   Respiratory:  Positive for chest tightness, cough, shortness of breath and wheezing.    Cardiovascular:  Positive for palpitations.        Racing   Gastrointestinal:  Positive for abdominal distention, constipation and nausea.   Genitourinary: Negative.     Musculoskeletal:  Positive for gait problem.   Skin:  Positive for rash.        Left foot rash   Neurological:  Positive for extremity weakness, gait problem, headaches and numbness.   Psychiatric/Behavioral:  Positive for confusion and sleep disturbance.      The patient's current pain level was assessed.  They report currently having a pain of 3 out of 10.  They feel their pain is under control with the use of pain medications.    Performance Status:  The Karnofsky performance scale today is 80, Normal activity with effort; some signs or symptoms of disease (ECOG equivalent 1).        OBJECTIVE  Physical Exam:  /71   Pulse 99   Temp 36 °C (96.8 °F) (Temporal)   Resp 18   SpO2 99%    Physical Exam  Constitutional:       General: He is not in acute distress.  HENT:      Head: Normocephalic and atraumatic.   Eyes:      Extraocular Movements: Extraocular movements intact.   Cardiovascular:      Rate and Rhythm: Normal rate.      Heart sounds: No murmur heard.  Pulmonary:      Effort: Pulmonary effort is normal.      Comments: Decreased breath sounds on the left  Abdominal:      General: Bowel sounds are normal. There is no distension.      Palpations: Abdomen is soft. There is no mass.      Tenderness: There is no abdominal tenderness.   Musculoskeletal:          General: Normal range of motion.      Comments: Right leg amputation   Skin:     Findings: No rash.   Neurological:      General: No focal deficit present.      Mental Status: He is alert and oriented to person, place, and time.      Cranial Nerves: No cranial nerve deficit.      Sensory: No sensory deficit.   Psychiatric:         Mood and Affect: Mood normal.               ASSESSMENT:  Mkie Franco is a 64 y.o. male with Small cell lung cancer, left upper lobe (CMS/HCC), Clinical: Stage IVB (cT4, cN3, cM1c) with spine metastases and a IAC enhancement on MRI brain. He is currently undergoing chemotherapy.      PLAN:  I had a detailed discussion with Mr. Franco regarding his clinical and pathological findings. I explained to him that the lesion on his right internal auditory canal is likely a small benign schwannoma. I recommend a 3 month MRI brain for confirmation. In terms of his systemic disease. As he has recently started systemic therapy and he is asymptomatic - I would continue to observe with a short interval imaging of the spine. As there were some areas of epidural extension. Small cell lung cancer responds really well in the body and I expect these areas of epidural extension will resolve with systemic therapy alone. I will recommended a MRI total spine in 4-6 weeks. After completing his systemic therapy, depending on his response, it would be reasonable to consider consolidative thoracic radiotherapy and PCI for the brain. Patient showed good understanding of our recommendations.

## 2024-02-23 ENCOUNTER — HOSPITAL ENCOUNTER (OUTPATIENT)
Dept: RADIOLOGY | Facility: HOSPITAL | Age: 64
Discharge: HOME | End: 2024-02-23
Payer: COMMERCIAL

## 2024-02-23 DIAGNOSIS — C34.90 SCLC (SMALL CELL LUNG CARCINOMA) (MULTI): ICD-10-CM

## 2024-02-23 PROCEDURE — 71260 CT THORAX DX C+: CPT | Performed by: RADIOLOGY

## 2024-02-23 PROCEDURE — 74177 CT ABD & PELVIS W/CONTRAST: CPT | Performed by: RADIOLOGY

## 2024-02-23 PROCEDURE — 2550000001 HC RX 255 CONTRASTS: Performed by: NURSE PRACTITIONER

## 2024-02-23 PROCEDURE — 74177 CT ABD & PELVIS W/CONTRAST: CPT

## 2024-02-23 RX ADMIN — IOHEXOL 75 ML: 350 INJECTION, SOLUTION INTRAVENOUS at 10:16
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
## 2024-02-28 ENCOUNTER — INFUSION (OUTPATIENT)
Dept: HEMATOLOGY/ONCOLOGY | Facility: CLINIC | Age: 64
End: 2024-02-28
Payer: COMMERCIAL

## 2024-02-28 ENCOUNTER — OFFICE VISIT (OUTPATIENT)
Dept: HEMATOLOGY/ONCOLOGY | Facility: CLINIC | Age: 64
End: 2024-02-28
Payer: COMMERCIAL

## 2024-02-28 VITALS
WEIGHT: 183.2 LBS | TEMPERATURE: 97.7 F | HEART RATE: 92 BPM | DIASTOLIC BLOOD PRESSURE: 77 MMHG | RESPIRATION RATE: 18 BRPM | SYSTOLIC BLOOD PRESSURE: 117 MMHG | OXYGEN SATURATION: 96 % | BODY MASS INDEX: 25.62 KG/M2

## 2024-02-28 DIAGNOSIS — C34.90 SCLC (SMALL CELL LUNG CARCINOMA) (MULTI): Primary | ICD-10-CM

## 2024-02-28 DIAGNOSIS — C34.90 SCLC (SMALL CELL LUNG CARCINOMA) (MULTI): ICD-10-CM

## 2024-02-28 DIAGNOSIS — R91.8 MASS OF LEFT LUNG: ICD-10-CM

## 2024-02-28 DIAGNOSIS — R91.8 MASS OF LEFT LUNG: Primary | ICD-10-CM

## 2024-02-28 LAB
ALBUMIN SERPL BCP-MCNC: 4.2 G/DL (ref 3.4–5)
ALP SERPL-CCNC: 54 U/L (ref 33–136)
ALT SERPL W P-5'-P-CCNC: 19 U/L (ref 10–52)
ANION GAP SERPL CALC-SCNC: 12 MMOL/L (ref 10–20)
ANION GAP SERPL CALC-SCNC: 16 MMOL/L (ref 10–20)
AST SERPL W P-5'-P-CCNC: 16 U/L (ref 9–39)
BASOPHILS # BLD AUTO: 0.02 X10*3/UL (ref 0–0.1)
BASOPHILS NFR BLD AUTO: 0.5 %
BILIRUB SERPL-MCNC: 0.4 MG/DL (ref 0–1.2)
BUN SERPL-MCNC: 17 MG/DL (ref 6–23)
BUN SERPL-MCNC: 17 MG/DL (ref 6–23)
CA-I BLD-SCNC: 1.19 MMOL/L (ref 1.1–1.33)
CALCIUM SERPL-MCNC: 9.7 MG/DL (ref 8.6–10.6)
CHLORIDE SERPL-SCNC: 100 MMOL/L (ref 98–107)
CHLORIDE SERPL-SCNC: 99 MMOL/L (ref 98–107)
CO2 SERPL-SCNC: 28 MMOL/L (ref 21–32)
CO2 SERPL-SCNC: 30 MMOL/L (ref 21–32)
CREAT SERPL-MCNC: 0.76 MG/DL (ref 0.5–1.3)
CREAT SERPL-MCNC: 0.9 MG/DL (ref 0.6–1.3)
EGFRCR SERPLBLD CKD-EPI 2021: >90 ML/MIN/1.73M*2
EOSINOPHIL # BLD AUTO: 0.04 X10*3/UL (ref 0–0.7)
EOSINOPHIL NFR BLD AUTO: 1 %
ERYTHROCYTE [DISTWIDTH] IN BLOOD BY AUTOMATED COUNT: 15.1 % (ref 11.5–14.5)
GFR SERPL CREATININE-BSD FRML MDRD: >90 ML/MIN/1.73M*2
GLUCOSE SERPL-MCNC: 79 MG/DL (ref 74–99)
GLUCOSE SERPL-MCNC: 90 MG/DL (ref 74–99)
HCT VFR BLD AUTO: 40.9 % (ref 41–52)
HGB BLD-MCNC: 13.2 G/DL (ref 13.5–17.5)
IMM GRANULOCYTES # BLD AUTO: 0.15 X10*3/UL (ref 0–0.7)
IMM GRANULOCYTES NFR BLD AUTO: 3.9 % (ref 0–0.9)
LYMPHOCYTES # BLD AUTO: 2.15 X10*3/UL (ref 1.2–4.8)
LYMPHOCYTES NFR BLD AUTO: 55.3 %
MCH RBC QN AUTO: 30.6 PG (ref 26–34)
MCHC RBC AUTO-ENTMCNC: 32.3 G/DL (ref 32–36)
MCV RBC AUTO: 95 FL (ref 80–100)
MONOCYTES # BLD AUTO: 0.54 X10*3/UL (ref 0.1–1)
MONOCYTES NFR BLD AUTO: 13.9 %
NEUTROPHILS # BLD AUTO: 0.99 X10*3/UL (ref 1.2–7.7)
NEUTROPHILS NFR BLD AUTO: 25.4 %
NRBC BLD-RTO: ABNORMAL /100{WBCS}
PLATELET # BLD AUTO: 447 X10*3/UL (ref 150–450)
POTASSIUM SERPL-SCNC: 4.3 MMOL/L (ref 3.5–5.3)
POTASSIUM SERPL-SCNC: 4.8 MMOL/L (ref 3.5–5.3)
PROT SERPL-MCNC: 6.7 G/DL (ref 6.4–8.2)
RBC # BLD AUTO: 4.32 X10*6/UL (ref 4.5–5.9)
SODIUM SERPL-SCNC: 138 MMOL/L (ref 136–145)
SODIUM SERPL-SCNC: 138 MMOL/L (ref 136–145)
WBC # BLD AUTO: 3.9 X10*3/UL (ref 4.4–11.3)

## 2024-02-28 PROCEDURE — 80053 COMPREHEN METABOLIC PANEL: CPT

## 2024-02-28 PROCEDURE — 99215 OFFICE O/P EST HI 40 MIN: CPT | Performed by: INTERNAL MEDICINE

## 2024-02-28 PROCEDURE — 85025 COMPLETE CBC W/AUTO DIFF WBC: CPT

## 2024-02-28 PROCEDURE — 80047 BASIC METABLC PNL IONIZED CA: CPT | Mod: 59 | Performed by: INTERNAL MEDICINE

## 2024-02-28 PROCEDURE — 3078F DIAST BP <80 MM HG: CPT | Performed by: INTERNAL MEDICINE

## 2024-02-28 PROCEDURE — 36415 COLL VENOUS BLD VENIPUNCTURE: CPT

## 2024-02-28 PROCEDURE — 1036F TOBACCO NON-USER: CPT | Performed by: INTERNAL MEDICINE

## 2024-02-28 PROCEDURE — 3074F SYST BP LT 130 MM HG: CPT | Performed by: INTERNAL MEDICINE

## 2024-02-28 RX ORDER — LORAZEPAM 0.5 MG/1
TABLET ORAL
Qty: 30 TABLET | Refills: 0 | Status: ON HOLD | OUTPATIENT
Start: 2024-02-28 | End: 2024-03-08 | Stop reason: WASHOUT

## 2024-02-28 RX ORDER — PANTOPRAZOLE SODIUM 40 MG/1
TABLET, DELAYED RELEASE ORAL
Qty: 30 TABLET | Refills: 5 | Status: SHIPPED | OUTPATIENT
Start: 2024-02-28 | End: 2024-03-14 | Stop reason: HOSPADM

## 2024-02-28 ASSESSMENT — PAIN SCALES - GENERAL: PAINLEVEL: 5

## 2024-02-28 NOTE — SIGNIFICANT EVENT
02/28/24 1242   Prechemo Checklist   Has the patient been in the hospital, ED, or urgent care since last date of service No   Chemo/Immuno Consent Signed Yes   Protocol/Indications Verified Yes   Confirmed to previous date/time of medication Yes   Compared to previous dose Yes   All medications are dated accurately Yes   Pregnancy Test Negative Not applicable   Parameters Met (!) No   Provider Notified Yes   Is Patient Proceeding With Treatment? No   BSA/Weight-Height Verified Yes   Dose Calculations Verified Yes

## 2024-02-29 ENCOUNTER — APPOINTMENT (OUTPATIENT)
Dept: HEMATOLOGY/ONCOLOGY | Facility: CLINIC | Age: 64
End: 2024-02-29
Payer: COMMERCIAL

## 2024-03-01 ENCOUNTER — APPOINTMENT (OUTPATIENT)
Dept: HEMATOLOGY/ONCOLOGY | Facility: CLINIC | Age: 64
End: 2024-03-01
Payer: COMMERCIAL

## 2024-03-04 ENCOUNTER — INFUSION (OUTPATIENT)
Dept: HEMATOLOGY/ONCOLOGY | Facility: CLINIC | Age: 64
End: 2024-03-04
Payer: COMMERCIAL

## 2024-03-04 ENCOUNTER — HOSPITAL ENCOUNTER (EMERGENCY)
Facility: HOSPITAL | Age: 64
Discharge: HOME | End: 2024-03-05
Attending: EMERGENCY MEDICINE
Payer: COMMERCIAL

## 2024-03-04 ENCOUNTER — APPOINTMENT (OUTPATIENT)
Dept: RADIOLOGY | Facility: HOSPITAL | Age: 64
End: 2024-03-04
Payer: COMMERCIAL

## 2024-03-04 ENCOUNTER — NUTRITION (OUTPATIENT)
Dept: HEMATOLOGY/ONCOLOGY | Facility: CLINIC | Age: 64
End: 2024-03-04
Payer: COMMERCIAL

## 2024-03-04 ENCOUNTER — APPOINTMENT (OUTPATIENT)
Dept: CARDIOLOGY | Facility: HOSPITAL | Age: 64
End: 2024-03-04
Payer: COMMERCIAL

## 2024-03-04 VITALS
RESPIRATION RATE: 22 BRPM | DIASTOLIC BLOOD PRESSURE: 80 MMHG | SYSTOLIC BLOOD PRESSURE: 124 MMHG | OXYGEN SATURATION: 96 % | TEMPERATURE: 97.5 F | HEART RATE: 91 BPM

## 2024-03-04 VITALS — HEIGHT: 71 IN | BODY MASS INDEX: 24.78 KG/M2 | WEIGHT: 177 LBS

## 2024-03-04 VITALS
DIASTOLIC BLOOD PRESSURE: 70 MMHG | SYSTOLIC BLOOD PRESSURE: 129 MMHG | OXYGEN SATURATION: 100 % | BODY MASS INDEX: 24.75 KG/M2 | WEIGHT: 177 LBS | HEART RATE: 88 BPM | TEMPERATURE: 97.7 F | RESPIRATION RATE: 18 BRPM

## 2024-03-04 DIAGNOSIS — C34.90 SCLC (SMALL CELL LUNG CARCINOMA) (MULTI): ICD-10-CM

## 2024-03-04 DIAGNOSIS — R91.8 MASS OF LEFT LUNG: ICD-10-CM

## 2024-03-04 DIAGNOSIS — C34.90 MALIGNANT NEOPLASM OF LUNG, UNSPECIFIED LATERALITY, UNSPECIFIED PART OF LUNG (MULTI): Primary | ICD-10-CM

## 2024-03-04 LAB
ALBUMIN SERPL BCP-MCNC: 3.8 G/DL (ref 3.4–5)
ALBUMIN SERPL BCP-MCNC: 4.4 G/DL (ref 3.4–5)
ALP SERPL-CCNC: 49 U/L (ref 33–136)
ALP SERPL-CCNC: 54 U/L (ref 33–136)
ALT SERPL W P-5'-P-CCNC: 12 U/L (ref 10–52)
ALT SERPL W P-5'-P-CCNC: 13 U/L (ref 10–52)
ANION GAP BLDV CALCULATED.4IONS-SCNC: 4 MMOL/L (ref 10–25)
ANION GAP SERPL CALC-SCNC: 13 MMOL/L (ref 10–20)
ANION GAP SERPL CALC-SCNC: 14 MMOL/L (ref 10–20)
APPEARANCE UR: ABNORMAL
AST SERPL W P-5'-P-CCNC: 16 U/L (ref 9–39)
AST SERPL W P-5'-P-CCNC: 16 U/L (ref 9–39)
BASE EXCESS BLDV CALC-SCNC: 6.2 MMOL/L (ref -2–3)
BASOPHILS # BLD AUTO: 0.06 X10*3/UL (ref 0–0.1)
BASOPHILS # BLD AUTO: 0.08 X10*3/UL (ref 0–0.1)
BASOPHILS NFR BLD AUTO: 0.7 %
BASOPHILS NFR BLD AUTO: 0.9 %
BILIRUB DIRECT SERPL-MCNC: 0 MG/DL (ref 0–0.3)
BILIRUB SERPL-MCNC: 0.2 MG/DL (ref 0–1.2)
BILIRUB SERPL-MCNC: 0.5 MG/DL (ref 0–1.2)
BILIRUB UR STRIP.AUTO-MCNC: NEGATIVE MG/DL
BNP SERPL-MCNC: 13 PG/ML (ref 0–99)
BODY TEMPERATURE: 37 DEGREES CELSIUS
BUN SERPL-MCNC: 19 MG/DL (ref 6–23)
BUN SERPL-MCNC: 19 MG/DL (ref 6–23)
CA-I BLDV-SCNC: 1.22 MMOL/L (ref 1.1–1.33)
CALCIUM SERPL-MCNC: 10 MG/DL (ref 8.6–10.6)
CALCIUM SERPL-MCNC: 9.1 MG/DL (ref 8.6–10.3)
CARDIAC TROPONIN I PNL SERPL HS: <3 NG/L (ref 0–20)
CHLORIDE BLDV-SCNC: 103 MMOL/L (ref 98–107)
CHLORIDE SERPL-SCNC: 101 MMOL/L (ref 98–107)
CHLORIDE SERPL-SCNC: 102 MMOL/L (ref 98–107)
CO2 SERPL-SCNC: 25 MMOL/L (ref 21–32)
CO2 SERPL-SCNC: 28 MMOL/L (ref 21–32)
COLOR UR: ABNORMAL
CORTIS AM PEAK SERPL-MSCNC: 23.2 UG/DL (ref 5–20)
CREAT SERPL-MCNC: 0.84 MG/DL (ref 0.5–1.3)
CREAT SERPL-MCNC: 0.9 MG/DL (ref 0.5–1.3)
D DIMER PPP FEU-MCNC: 881 NG/ML FEU
EGFRCR SERPLBLD CKD-EPI 2021: >90 ML/MIN/1.73M*2
EGFRCR SERPLBLD CKD-EPI 2021: >90 ML/MIN/1.73M*2
EOSINOPHIL # BLD AUTO: 0.05 X10*3/UL (ref 0–0.7)
EOSINOPHIL # BLD AUTO: 0.06 X10*3/UL (ref 0–0.7)
EOSINOPHIL NFR BLD AUTO: 0.6 %
EOSINOPHIL NFR BLD AUTO: 0.7 %
ERYTHROCYTE [DISTWIDTH] IN BLOOD BY AUTOMATED COUNT: 15.3 % (ref 11.5–14.5)
ERYTHROCYTE [DISTWIDTH] IN BLOOD BY AUTOMATED COUNT: 15.5 % (ref 11.5–14.5)
FLUAV RNA RESP QL NAA+PROBE: NOT DETECTED
FLUBV RNA RESP QL NAA+PROBE: NOT DETECTED
GLUCOSE BLDV-MCNC: 123 MG/DL (ref 74–99)
GLUCOSE SERPL-MCNC: 118 MG/DL (ref 74–99)
GLUCOSE SERPL-MCNC: 94 MG/DL (ref 74–99)
GLUCOSE UR STRIP.AUTO-MCNC: NEGATIVE MG/DL
HCO3 BLDV-SCNC: 33.1 MMOL/L (ref 22–26)
HCT VFR BLD AUTO: 40.8 % (ref 41–52)
HCT VFR BLD AUTO: 44.3 % (ref 41–52)
HCT VFR BLD EST: 43 % (ref 41–52)
HGB BLD-MCNC: 13.4 G/DL (ref 13.5–17.5)
HGB BLD-MCNC: 14.3 G/DL (ref 13.5–17.5)
HGB BLDV-MCNC: 14.2 G/DL (ref 13.5–17.5)
HYALINE CASTS #/AREA URNS AUTO: ABNORMAL /LPF
IMM GRANULOCYTES # BLD AUTO: 0.13 X10*3/UL (ref 0–0.7)
IMM GRANULOCYTES # BLD AUTO: 0.15 X10*3/UL (ref 0–0.7)
IMM GRANULOCYTES NFR BLD AUTO: 1.5 % (ref 0–0.9)
IMM GRANULOCYTES NFR BLD AUTO: 1.7 % (ref 0–0.9)
INHALED O2 CONCENTRATION: 25 %
KETONES UR STRIP.AUTO-MCNC: NEGATIVE MG/DL
LACTATE BLDV-SCNC: 1.9 MMOL/L (ref 0.4–2)
LEUKOCYTE ESTERASE UR QL STRIP.AUTO: NEGATIVE
LYMPHOCYTES # BLD AUTO: 2.45 X10*3/UL (ref 1.2–4.8)
LYMPHOCYTES # BLD AUTO: 2.52 X10*3/UL (ref 1.2–4.8)
LYMPHOCYTES NFR BLD AUTO: 27.6 %
LYMPHOCYTES NFR BLD AUTO: 29.4 %
MAGNESIUM SERPL-MCNC: 1.8 MG/DL (ref 1.6–2.4)
MCH RBC QN AUTO: 30.6 PG (ref 26–34)
MCH RBC QN AUTO: 31.7 PG (ref 26–34)
MCHC RBC AUTO-ENTMCNC: 32.3 G/DL (ref 32–36)
MCHC RBC AUTO-ENTMCNC: 32.8 G/DL (ref 32–36)
MCV RBC AUTO: 95 FL (ref 80–100)
MCV RBC AUTO: 97 FL (ref 80–100)
MONOCYTES # BLD AUTO: 0.69 X10*3/UL (ref 0.1–1)
MONOCYTES # BLD AUTO: 0.83 X10*3/UL (ref 0.1–1)
MONOCYTES NFR BLD AUTO: 8 %
MONOCYTES NFR BLD AUTO: 9.3 %
MUCOUS THREADS #/AREA URNS AUTO: ABNORMAL /LPF
NEUTROPHILS # BLD AUTO: 5.13 X10*3/UL (ref 1.2–7.7)
NEUTROPHILS # BLD AUTO: 5.32 X10*3/UL (ref 1.2–7.7)
NEUTROPHILS NFR BLD AUTO: 59.8 %
NEUTROPHILS NFR BLD AUTO: 59.8 %
NITRITE UR QL STRIP.AUTO: NEGATIVE
NRBC BLD-RTO: 0 /100 WBCS (ref 0–0)
NRBC BLD-RTO: ABNORMAL /100{WBCS}
OXYHGB MFR BLDV: 38.4 % (ref 45–75)
PCO2 BLDV: 56 MM HG (ref 41–51)
PH BLDV: 7.38 PH (ref 7.33–7.43)
PH UR STRIP.AUTO: 5 [PH]
PLATELET # BLD AUTO: 403 X10*3/UL (ref 150–450)
PLATELET # BLD AUTO: 442 X10*3/UL (ref 150–450)
PO2 BLDV: 29 MM HG (ref 35–45)
POTASSIUM BLDV-SCNC: 4.2 MMOL/L (ref 3.5–5.3)
POTASSIUM SERPL-SCNC: 4 MMOL/L (ref 3.5–5.3)
POTASSIUM SERPL-SCNC: 4.1 MMOL/L (ref 3.5–5.3)
PROT SERPL-MCNC: 6.4 G/DL (ref 6.4–8.2)
PROT SERPL-MCNC: 7 G/DL (ref 6.4–8.2)
PROT UR STRIP.AUTO-MCNC: ABNORMAL MG/DL
RBC # BLD AUTO: 4.23 X10*6/UL (ref 4.5–5.9)
RBC # BLD AUTO: 4.68 X10*6/UL (ref 4.5–5.9)
RBC # UR STRIP.AUTO: NEGATIVE /UL
RBC #/AREA URNS AUTO: ABNORMAL /HPF
SAO2 % BLDV: 40 % (ref 45–75)
SARS-COV-2 RNA RESP QL NAA+PROBE: NOT DETECTED
SODIUM BLDV-SCNC: 136 MMOL/L (ref 136–145)
SODIUM SERPL-SCNC: 136 MMOL/L (ref 136–145)
SODIUM SERPL-SCNC: 139 MMOL/L (ref 136–145)
SP GR UR STRIP.AUTO: 1.03
SQUAMOUS #/AREA URNS AUTO: ABNORMAL /HPF
TSH SERPL-ACNC: 2.35 MIU/L (ref 0.44–3.98)
UROBILINOGEN UR STRIP.AUTO-MCNC: 4 MG/DL
WBC # BLD AUTO: 8.6 X10*3/UL (ref 4.4–11.3)
WBC # BLD AUTO: 8.9 X10*3/UL (ref 4.4–11.3)
WBC #/AREA URNS AUTO: ABNORMAL /HPF

## 2024-03-04 PROCEDURE — 83880 ASSAY OF NATRIURETIC PEPTIDE: CPT | Performed by: EMERGENCY MEDICINE

## 2024-03-04 PROCEDURE — 82248 BILIRUBIN DIRECT: CPT | Performed by: EMERGENCY MEDICINE

## 2024-03-04 PROCEDURE — 84443 ASSAY THYROID STIM HORMONE: CPT

## 2024-03-04 PROCEDURE — 84484 ASSAY OF TROPONIN QUANT: CPT | Performed by: EMERGENCY MEDICINE

## 2024-03-04 PROCEDURE — 84132 ASSAY OF SERUM POTASSIUM: CPT | Performed by: EMERGENCY MEDICINE

## 2024-03-04 PROCEDURE — 83735 ASSAY OF MAGNESIUM: CPT | Performed by: EMERGENCY MEDICINE

## 2024-03-04 PROCEDURE — 2550000001 HC RX 255 CONTRASTS: Performed by: EMERGENCY MEDICINE

## 2024-03-04 PROCEDURE — 71045 X-RAY EXAM CHEST 1 VIEW: CPT | Performed by: RADIOLOGY

## 2024-03-04 PROCEDURE — 82533 TOTAL CORTISOL: CPT

## 2024-03-04 PROCEDURE — 84075 ASSAY ALKALINE PHOSPHATASE: CPT

## 2024-03-04 PROCEDURE — 71275 CT ANGIOGRAPHY CHEST: CPT

## 2024-03-04 PROCEDURE — 36415 COLL VENOUS BLD VENIPUNCTURE: CPT | Performed by: EMERGENCY MEDICINE

## 2024-03-04 PROCEDURE — 71045 X-RAY EXAM CHEST 1 VIEW: CPT

## 2024-03-04 PROCEDURE — 2500000004 HC RX 250 GENERAL PHARMACY W/ HCPCS (ALT 636 FOR OP/ED): Performed by: EMERGENCY MEDICINE

## 2024-03-04 PROCEDURE — 85379 FIBRIN DEGRADATION QUANT: CPT | Performed by: EMERGENCY MEDICINE

## 2024-03-04 PROCEDURE — 85025 COMPLETE CBC W/AUTO DIFF WBC: CPT

## 2024-03-04 PROCEDURE — 96374 THER/PROPH/DIAG INJ IV PUSH: CPT | Mod: 59

## 2024-03-04 PROCEDURE — 36415 COLL VENOUS BLD VENIPUNCTURE: CPT

## 2024-03-04 PROCEDURE — 71275 CT ANGIOGRAPHY CHEST: CPT | Performed by: RADIOLOGY

## 2024-03-04 PROCEDURE — 96361 HYDRATE IV INFUSION ADD-ON: CPT

## 2024-03-04 PROCEDURE — 93005 ELECTROCARDIOGRAM TRACING: CPT

## 2024-03-04 PROCEDURE — 81001 URINALYSIS AUTO W/SCOPE: CPT | Performed by: EMERGENCY MEDICINE

## 2024-03-04 PROCEDURE — 99285 EMERGENCY DEPT VISIT HI MDM: CPT | Mod: 25

## 2024-03-04 PROCEDURE — 85025 COMPLETE CBC W/AUTO DIFF WBC: CPT | Performed by: EMERGENCY MEDICINE

## 2024-03-04 PROCEDURE — 87636 SARSCOV2 & INF A&B AMP PRB: CPT | Performed by: EMERGENCY MEDICINE

## 2024-03-04 RX ORDER — DOXYCYCLINE 100 MG/1
100 TABLET ORAL 2 TIMES DAILY
Qty: 20 TABLET | Refills: 0 | Status: SHIPPED | OUTPATIENT
Start: 2024-03-04 | End: 2024-03-14 | Stop reason: HOSPADM

## 2024-03-04 RX ORDER — ONDANSETRON HYDROCHLORIDE 2 MG/ML
4 INJECTION, SOLUTION INTRAVENOUS ONCE
Status: DISCONTINUED | OUTPATIENT
Start: 2024-03-04 | End: 2024-03-05 | Stop reason: HOSPADM

## 2024-03-04 RX ORDER — MORPHINE SULFATE 4 MG/ML
4 INJECTION, SOLUTION INTRAMUSCULAR; INTRAVENOUS ONCE
Status: COMPLETED | OUTPATIENT
Start: 2024-03-04 | End: 2024-03-04

## 2024-03-04 RX ADMIN — IOHEXOL 75 ML: 350 INJECTION, SOLUTION INTRAVENOUS at 20:19

## 2024-03-04 RX ADMIN — MORPHINE SULFATE 4 MG: 4 INJECTION, SOLUTION INTRAMUSCULAR; INTRAVENOUS at 16:34

## 2024-03-04 RX ADMIN — SODIUM CHLORIDE, POTASSIUM CHLORIDE, SODIUM LACTATE AND CALCIUM CHLORIDE 1000 ML: 600; 310; 30; 20 INJECTION, SOLUTION INTRAVENOUS at 16:32

## 2024-03-04 ASSESSMENT — PAIN SCALES - GENERAL
PAINLEVEL_OUTOF10: 5 - MODERATE PAIN
PAINLEVEL: 0-NO PAIN
PAINLEVEL_OUTOF10: 5 - MODERATE PAIN
PAINLEVEL_OUTOF10: 4
PAINLEVEL_OUTOF10: 6

## 2024-03-04 ASSESSMENT — PAIN DESCRIPTION - LOCATION
LOCATION: CHEST
LOCATION: CHEST

## 2024-03-04 ASSESSMENT — PAIN DESCRIPTION - ORIENTATION: ORIENTATION: LEFT

## 2024-03-04 ASSESSMENT — PAIN - FUNCTIONAL ASSESSMENT
PAIN_FUNCTIONAL_ASSESSMENT: 0-10
PAIN_FUNCTIONAL_ASSESSMENT: 0-10

## 2024-03-04 NOTE — SIGNIFICANT EVENT
03/04/24 1457   Prechemo Checklist   Has the patient been in the hospital, ED, or urgent care since last date of service No   Chemo/Immuno Consent Signed Yes   Protocol/Indications Verified Yes   Confirmed to previous date/time of medication Yes  (dose delayed due to low ANC)   Compared to previous dose Yes   All medications are dated accurately Yes   Pregnancy Test Negative Not applicable   Parameters Met Yes   BSA/Weight-Height Verified Yes   Dose Calculations Verified Yes
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
## 2024-03-04 NOTE — PROGRESS NOTES
1510: Patient reports worsening SOB, chest heaviness, fatigue, visual changes/diplopia, dizziness, and light-headedness since he was last seen on 2/28. Dr. Sandhu notified, Desmond Brewer RN to bedside. Per Desmond, patient became tachycardic to 145 and hypoxic to 74% on RA upon standing, placed on 2 L NC. Per Dr. Sandhu, treatment held and 911 called. Patient educated on process. PIV left accessed, Asuncion NEVES notified.

## 2024-03-05 ENCOUNTER — TELEPHONE (OUTPATIENT)
Dept: HEMATOLOGY/ONCOLOGY | Facility: HOSPITAL | Age: 64
End: 2024-03-05
Payer: COMMERCIAL

## 2024-03-05 ENCOUNTER — APPOINTMENT (OUTPATIENT)
Dept: HEMATOLOGY/ONCOLOGY | Facility: CLINIC | Age: 64
End: 2024-03-05
Payer: COMMERCIAL

## 2024-03-05 LAB
ATRIAL RATE: 89 BPM
P AXIS: 65 DEGREES
P OFFSET: 193 MS
P ONSET: 140 MS
PR INTERVAL: 154 MS
Q ONSET: 217 MS
QRS COUNT: 15 BEATS
QRS DURATION: 88 MS
QT INTERVAL: 360 MS
QTC CALCULATION(BAZETT): 438 MS
QTC FREDERICIA: 410 MS
R AXIS: 47 DEGREES
T AXIS: 14 DEGREES
T OFFSET: 397 MS
VENTRICULAR RATE: 89 BPM

## 2024-03-05 NOTE — TELEPHONE ENCOUNTER
Pt called to follow up on ED visit yesterday and inquires how to proceed with his upcoming appointments since he is scheduled for infusion today.

## 2024-03-05 NOTE — ED PROVIDER NOTES
HPI   No chief complaint on file.      HPI: []  64-year-old male with history of lung cancer diagnosed recently on chemotherapy comes in with increasing cough and shortness of breath.  He states for the last about a month duration has a cough is nonproductive.  Today he went for his chemotherapy and he was sent to the ED for evaluation.  Patient denies any chest pain pressure heaviness.  Cough nonproductive.  No fever or chills.  No PND orthopnea.  No hematemesis melena or hematochezia, hemoptysis no abdominal pain no nausea or diarrhea good p.o. take no lethargy no recent travel hospitalization or antibiotic use.  His chemo was deferred last week due to leukopenia    Past history: Lung cancer, hernia repair, right leg amputation  Social: Ex tobacco use denies current tobacco alcohol drug abuse.  REVIEW OF SYSTEMS:    GENERAL.: No weight loss, fatigue, anorexia, insomnia, fever.    EYES: No vision loss, double vision, drainage, eye pain.    ENT: No pharyngitis, dry mouth.    CARDIOPULMONARY: No chest pain, palpitations, syncope, near syncope.  Positive for shortness of breath, positive for cough, hemoptysis.    GI: No abdominal pain, change in bowel habits, melena, hematemesis, hematochezia, nausea, vomiting, diarrhea.    : No discharge, dysuria, frequency, urgency, hematuria.    MS: No limb pain, joint pain, joint swelling.    SKIN: No rashes.    PSYCH: No depression, anxiety, suicidality, homicidality.    Review of systems is otherwise negative unless stated above or in history of present illness.  Social history, family history, allergies reviewed.  PHYSICAL EXAM:    GENERAL: Vitals noted, no distress. Alert and oriented  x 3. Non-toxic.  Chronically ill-appearing    EENT: TMs clear. Posterior oropharynx unremarkable. No meningismus. No LAD.     NECK: Supple. Nontender. No midline tenderness.     CARDIAC: Regular, rate, rhythm. No murmurs rubs or gallops. No JVD    PULMONARY: Fine bibasilar crackles no wheezes  rales or rhonchi. No respiratory distress.  No tachypnea stridor or retractions able to speak in full sentences    ABDOMEN: Soft, nonsurgical. Nontender. No peritoneal signs. Normoactive bowel sounds. No pulsatile masses.     EXTREMITIES: Left lower extremity has no peripheral edema. Negative Homans bilaterally, no cords. 2 Plus bounding pulses well-perfused, right lower extremity is amputated prosthesis in place.    SKIN: No rash. Intact.     NEURO: No focal neurologic deficits, NIH score of 0. Cranial nerves normal as tested from II through XII.     MEDICAL DECISION MAKING:  EKG on my interpretation shows normal sinus rhythm normal axis rate mid 70s with no acute ischemic change.    CBC with differential shows no leukocytosis, chemistries unremarkable, D-dimer elevated, chest ray shows some chronic changes, CT angio shows no pulm embolism but has findings concerning for worsening disease with worsening lymphadenopathy.  Please refer to the radiology report.    Treatment in ED: Upon arrival IV established external cardiac monitor.    ED course: Patient remains asymptomatic remains afebrile normotensive no tachycardia or hypoxia.  Secure message sent to his oncologist Dr. Sandhu, patient made aware of his abnormal CAT scan findings he wants to go home.    Impression: #1 worsening lung cancer    Plan set MDM: 64-year-old male history of lung cancer on chemotherapy presents with increasing cough and shortness of breath imaging concerning for worsening lung cancer with questionable areas of pneumonitis although I think this is all lung cancer was getting worse rather than to infection or pneumonia low concern for sepsis septic shock or pulm embolism or STEMI or NSTEMI or congestive heart failure patient wants to go home not unreasonable will be discharged home with doxycycline advised urgent outpatient follow-up with primary doctor and his oncologist with strict return precautions.                              Ki  Coma Scale Score: 15                     Patient History   Past Medical History:   Diagnosis Date    Cough variant asthma 2017    Cough variant asthma    Hyperlipidemia     Lung cancer (CMS/HCC)     Personal history of other specified conditions 2022    History of postnasal drip     Past Surgical History:   Procedure Laterality Date    HERNIA REPAIR      IR INTERVENTION FILTER PLACEMENT      LEG AMPUTATION Right      Family History   Problem Relation Name Age of Onset    Cancer Mother       Social History     Tobacco Use    Smoking status: Former     Types: Cigarettes     Quit date:      Years since quittin.1    Smokeless tobacco: Never   Substance Use Topics    Alcohol use: Not Currently     Comment: social    Drug use: Never       Physical Exam   ED Triage Vitals   Temperature Heart Rate Respirations BP   24 1558 24 1601 24 1558 24 1558   36.4 °C (97.5 °F) 99 16 (!) 103/92      Pulse Ox Temp Source Heart Rate Source Patient Position   24 1558 24 1558 24 1558 24 1558   98 % Oral Monitor Lying      BP Location FiO2 (%)     24 1558 --     Right arm        Physical Exam    ED Course & MDM   ED Course as of 24 2353   Mon Mar 04, 2024   1803 XR chest 1 view [MT]   2341 Patient EKG on my interpretation shows normal sinus rhythm normal axis rate mid 70s no ischemic changes.  CBC with differential chemistries unremarkable UA negative, chest x-ray shows a chronic changes CT angio of the chest shows no PE but highly concerning for worsening disease and worsening cancer please refer to radiology, patient advised hospital he was to go home currently asymptomatic not hypoxic room air report, stable hemodynamic.  Not unreasonable secure message sent to patient's oncologist Dr. Sandhu, patient be discharged home advised urgent outpatient with strict return precaution.  Follow-up with his oncologist [MT]      ED Course User Index  [MT] Marika Bach,  MD         Diagnoses as of 03/04/24 2358   Malignant neoplasm of lung, unspecified laterality, unspecified part of lung (CMS/HCC)       Medical Decision Making      Procedure  Procedures     Marika Bach MD  03/04/24 7629

## 2024-03-06 ENCOUNTER — APPOINTMENT (OUTPATIENT)
Dept: HEMATOLOGY/ONCOLOGY | Facility: CLINIC | Age: 64
End: 2024-03-06
Payer: COMMERCIAL

## 2024-03-07 ENCOUNTER — APPOINTMENT (OUTPATIENT)
Dept: RADIOLOGY | Facility: HOSPITAL | Age: 64
DRG: 100 | End: 2024-03-07
Payer: COMMERCIAL

## 2024-03-07 ENCOUNTER — APPOINTMENT (OUTPATIENT)
Dept: PRIMARY CARE | Facility: CLINIC | Age: 64
End: 2024-03-07
Payer: COMMERCIAL

## 2024-03-07 ENCOUNTER — HOSPITAL ENCOUNTER (INPATIENT)
Facility: HOSPITAL | Age: 64
LOS: 7 days | Discharge: HOSPICE | DRG: 100 | End: 2024-03-14
Attending: STUDENT IN AN ORGANIZED HEALTH CARE EDUCATION/TRAINING PROGRAM | Admitting: INTERNAL MEDICINE
Payer: COMMERCIAL

## 2024-03-07 DIAGNOSIS — C34.90 LUNG CANCER METASTATIC TO BONE (MULTI): ICD-10-CM

## 2024-03-07 DIAGNOSIS — R56.9 SEIZURE (MULTI): Primary | ICD-10-CM

## 2024-03-07 DIAGNOSIS — C79.51 LUNG CANCER METASTATIC TO BONE (MULTI): ICD-10-CM

## 2024-03-07 DIAGNOSIS — C34.12 SMALL CELL LUNG CANCER, LEFT UPPER LOBE (MULTI): ICD-10-CM

## 2024-03-07 LAB
ALBUMIN SERPL BCP-MCNC: 4.4 G/DL (ref 3.4–5)
ALP SERPL-CCNC: 68 U/L (ref 33–136)
ALT SERPL W P-5'-P-CCNC: 25 U/L (ref 10–52)
ANION GAP BLDA CALCULATED.4IONS-SCNC: 11 MMO/L (ref 10–25)
ANION GAP BLDV CALCULATED.4IONS-SCNC: 23 MMOL/L (ref 10–25)
ANION GAP BLDV CALCULATED.4IONS-SCNC: 8 MMOL/L (ref 10–25)
ANION GAP SERPL CALC-SCNC: 34 MMOL/L (ref 10–20)
APPEARANCE UR: ABNORMAL
AST SERPL W P-5'-P-CCNC: 25 U/L (ref 9–39)
BACTERIA #/AREA URNS AUTO: ABNORMAL /HPF
BASE EXCESS BLDA CALC-SCNC: -1.3 MMOL/L (ref -2–3)
BASE EXCESS BLDV CALC-SCNC: -14.9 MMOL/L (ref -2–3)
BASE EXCESS BLDV CALC-SCNC: -3.9 MMOL/L (ref -2–3)
BASOPHILS # BLD MANUAL: 0 X10*3/UL (ref 0–0.1)
BASOPHILS NFR BLD MANUAL: 0 %
BILIRUB SERPL-MCNC: 0.5 MG/DL (ref 0–1.2)
BILIRUB UR STRIP.AUTO-MCNC: NEGATIVE MG/DL
BODY TEMPERATURE: ABNORMAL
BUN SERPL-MCNC: 22 MG/DL (ref 6–23)
CA-I BLDA-SCNC: 1.16 MMOL/L (ref 1.1–1.33)
CA-I BLDV-SCNC: 1.12 MMOL/L (ref 1.1–1.33)
CA-I BLDV-SCNC: 1.18 MMOL/L (ref 1.1–1.33)
CALCIUM SERPL-MCNC: 9.5 MG/DL (ref 8.6–10.3)
CHLORIDE BLDA-SCNC: 102 MMOL/L (ref 98–107)
CHLORIDE BLDV-SCNC: 101 MMOL/L (ref 98–107)
CHLORIDE BLDV-SCNC: 102 MMOL/L (ref 98–107)
CHLORIDE SERPL-SCNC: 95 MMOL/L (ref 98–107)
CO2 SERPL-SCNC: 14 MMOL/L (ref 21–32)
COLOR UR: YELLOW
CREAT SERPL-MCNC: 1.15 MG/DL (ref 0.5–1.3)
EGFRCR SERPLBLD CKD-EPI 2021: 72 ML/MIN/1.73M*2
EOSINOPHIL # BLD MANUAL: 0 X10*3/UL (ref 0–0.7)
EOSINOPHIL NFR BLD MANUAL: 0 %
ERYTHROCYTE [DISTWIDTH] IN BLOOD BY AUTOMATED COUNT: 15 % (ref 11.5–14.5)
FLUAV RNA RESP QL NAA+PROBE: NOT DETECTED
FLUBV RNA RESP QL NAA+PROBE: NOT DETECTED
GLUCOSE BLD MANUAL STRIP-MCNC: 165 MG/DL (ref 74–99)
GLUCOSE BLD MANUAL STRIP-MCNC: 189 MG/DL (ref 74–99)
GLUCOSE BLDA-MCNC: 146 MG/DL (ref 74–99)
GLUCOSE BLDV-MCNC: 163 MG/DL (ref 74–99)
GLUCOSE BLDV-MCNC: 200 MG/DL (ref 74–99)
GLUCOSE SERPL-MCNC: 188 MG/DL (ref 74–99)
GLUCOSE UR STRIP.AUTO-MCNC: NEGATIVE MG/DL
HCO3 BLDA-SCNC: 22.7 MMOL/L (ref 22–26)
HCO3 BLDV-SCNC: 13.2 MMOL/L (ref 22–26)
HCO3 BLDV-SCNC: 24.1 MMOL/L (ref 22–26)
HCT VFR BLD AUTO: 45.6 % (ref 41–52)
HCT VFR BLD EST: 38 % (ref 41–52)
HCT VFR BLD EST: 41 % (ref 41–52)
HCT VFR BLD EST: 44 % (ref 41–52)
HGB BLD-MCNC: 14.5 G/DL (ref 13.5–17.5)
HGB BLDA-MCNC: 12.8 G/DL (ref 13.5–17.5)
HGB BLDV-MCNC: 13.5 G/DL (ref 13.5–17.5)
HGB BLDV-MCNC: 14.6 G/DL (ref 13.5–17.5)
IMM GRANULOCYTES # BLD AUTO: 1.77 X10*3/UL (ref 0–0.7)
IMM GRANULOCYTES NFR BLD AUTO: 6.7 % (ref 0–0.9)
INHALED O2 CONCENTRATION: 21 %
INHALED O2 CONCENTRATION: 60 %
INHALED O2 CONCENTRATION: 80 %
INR PPP: 1.3 (ref 0.9–1.1)
KETONES UR STRIP.AUTO-MCNC: ABNORMAL MG/DL
LACTATE BLDA-SCNC: 1.5 MMOL/L (ref 0.4–2)
LACTATE BLDV-SCNC: 14.4 MMOL/L (ref 0.4–2)
LACTATE BLDV-SCNC: 3.7 MMOL/L (ref 0.4–2)
LACTATE BLDV-SCNC: 3.7 MMOL/L (ref 0.4–2)
LACTATE SERPL-SCNC: 15.6 MMOL/L (ref 0.4–2)
LEUKOCYTE ESTERASE UR QL STRIP.AUTO: NEGATIVE
LYMPHOCYTES # BLD MANUAL: 2.63 X10*3/UL (ref 1.2–4.8)
LYMPHOCYTES NFR BLD MANUAL: 10 %
MAGNESIUM SERPL-MCNC: 2.45 MG/DL (ref 1.6–2.4)
MCH RBC QN AUTO: 31.2 PG (ref 26–34)
MCHC RBC AUTO-ENTMCNC: 31.8 G/DL (ref 32–36)
MCV RBC AUTO: 98 FL (ref 80–100)
METAMYELOCYTES # BLD MANUAL: 0.53 X10*3/UL
METAMYELOCYTES NFR BLD MANUAL: 2 %
MONOCYTES # BLD MANUAL: 0.79 X10*3/UL (ref 0.1–1)
MONOCYTES NFR BLD MANUAL: 3 %
MRSA DNA SPEC QL NAA+PROBE: NOT DETECTED
MYELOCYTES # BLD MANUAL: 0.53 X10*3/UL
MYELOCYTES NFR BLD MANUAL: 2 %
NEUTROPHILS # BLD MANUAL: 21.57 X10*3/UL (ref 1.2–7.7)
NEUTS BAND # BLD MANUAL: 0.79 X10*3/UL (ref 0–0.7)
NEUTS BAND NFR BLD MANUAL: 3 %
NEUTS SEG # BLD MANUAL: 20.78 X10*3/UL (ref 1.2–7)
NEUTS SEG NFR BLD MANUAL: 79 %
NITRITE UR QL STRIP.AUTO: NEGATIVE
NRBC BLD-RTO: 0.1 /100 WBCS (ref 0–0)
OXYHGB MFR BLDA: 98.4 % (ref 94–98)
OXYHGB MFR BLDV: 55.8 % (ref 45–75)
OXYHGB MFR BLDV: 67.4 % (ref 45–75)
PCO2 BLDA: 35 MM HG (ref 38–42)
PCO2 BLDV: 38 MM HG (ref 41–51)
PCO2 BLDV: 55 MM HG (ref 41–51)
PH BLDA: 7.42 PH (ref 7.38–7.42)
PH BLDV: 7.15 PH (ref 7.33–7.43)
PH BLDV: 7.25 PH (ref 7.33–7.43)
PH UR STRIP.AUTO: 6 [PH]
PHOSPHATE SERPL-MCNC: 5.1 MG/DL (ref 2.5–4.9)
PLATELET # BLD AUTO: 494 X10*3/UL (ref 150–450)
PLATELET CLUMP BLD QL SMEAR: PRESENT
PO2 BLDA: 264 MM HG (ref 85–95)
PO2 BLDV: 38 MM HG (ref 35–45)
PO2 BLDV: 47 MM HG (ref 35–45)
POLYCHROMASIA BLD QL SMEAR: ABNORMAL
POTASSIUM BLDA-SCNC: 3.9 MMOL/L (ref 3.5–5.3)
POTASSIUM BLDV-SCNC: 4.1 MMOL/L (ref 3.5–5.3)
POTASSIUM BLDV-SCNC: 4.8 MMOL/L (ref 3.5–5.3)
POTASSIUM SERPL-SCNC: 3.9 MMOL/L (ref 3.5–5.3)
PROT SERPL-MCNC: 7.6 G/DL (ref 6.4–8.2)
PROT UR STRIP.AUTO-MCNC: ABNORMAL MG/DL
PROTHROMBIN TIME: 14.1 SECONDS (ref 9.8–12.8)
RBC # BLD AUTO: 4.65 X10*6/UL (ref 4.5–5.9)
RBC # UR STRIP.AUTO: ABNORMAL /UL
RBC #/AREA URNS AUTO: >20 /HPF
RBC MORPH BLD: ABNORMAL
SAO2 % BLDA: 100 % (ref 94–100)
SAO2 % BLDV: 57 % (ref 45–75)
SAO2 % BLDV: 70 % (ref 45–75)
SARS-COV-2 RNA RESP QL NAA+PROBE: NOT DETECTED
SODIUM BLDA-SCNC: 132 MMOL/L (ref 136–145)
SODIUM BLDV-SCNC: 130 MMOL/L (ref 136–145)
SODIUM BLDV-SCNC: 132 MMOL/L (ref 136–145)
SODIUM SERPL-SCNC: 139 MMOL/L (ref 136–145)
SP GR UR STRIP.AUTO: 1.05
SQUAMOUS #/AREA URNS AUTO: ABNORMAL /HPF
TEST COMMENT: ABNORMAL
TOTAL CELLS COUNTED BLD: 100
UROBILINOGEN UR STRIP.AUTO-MCNC: <2 MG/DL
VARIANT LYMPHS # BLD MANUAL: 0.26 X10*3/UL (ref 0–0.5)
VARIANT LYMPHS NFR BLD: 1 %
WBC # BLD AUTO: 26.3 X10*3/UL (ref 4.4–11.3)
WBC #/AREA URNS AUTO: ABNORMAL /HPF

## 2024-03-07 PROCEDURE — 96375 TX/PRO/DX INJ NEW DRUG ADDON: CPT | Mod: 59

## 2024-03-07 PROCEDURE — 94640 AIRWAY INHALATION TREATMENT: CPT

## 2024-03-07 PROCEDURE — 87449 NOS EACH ORGANISM AG IA: CPT | Mod: GEALAB

## 2024-03-07 PROCEDURE — 2500000004 HC RX 250 GENERAL PHARMACY W/ HCPCS (ALT 636 FOR OP/ED): Performed by: STUDENT IN AN ORGANIZED HEALTH CARE EDUCATION/TRAINING PROGRAM

## 2024-03-07 PROCEDURE — 71045 X-RAY EXAM CHEST 1 VIEW
CPT | Mod: REPEAT PROCEDURE BY ANOTHER PHYSICIAN | Performed by: STUDENT IN AN ORGANIZED HEALTH CARE EDUCATION/TRAINING PROGRAM

## 2024-03-07 PROCEDURE — 84132 ASSAY OF SERUM POTASSIUM: CPT | Performed by: STUDENT IN AN ORGANIZED HEALTH CARE EDUCATION/TRAINING PROGRAM

## 2024-03-07 PROCEDURE — 83605 ASSAY OF LACTIC ACID: CPT | Performed by: STUDENT IN AN ORGANIZED HEALTH CARE EDUCATION/TRAINING PROGRAM

## 2024-03-07 PROCEDURE — 31500 INSERT EMERGENCY AIRWAY: CPT | Performed by: STUDENT IN AN ORGANIZED HEALTH CARE EDUCATION/TRAINING PROGRAM

## 2024-03-07 PROCEDURE — 71045 X-RAY EXAM CHEST 1 VIEW: CPT

## 2024-03-07 PROCEDURE — 71275 CT ANGIOGRAPHY CHEST: CPT

## 2024-03-07 PROCEDURE — 81001 URINALYSIS AUTO W/SCOPE: CPT | Performed by: STUDENT IN AN ORGANIZED HEALTH CARE EDUCATION/TRAINING PROGRAM

## 2024-03-07 PROCEDURE — 70450 CT HEAD/BRAIN W/O DYE: CPT

## 2024-03-07 PROCEDURE — 85610 PROTHROMBIN TIME: CPT | Performed by: STUDENT IN AN ORGANIZED HEALTH CARE EDUCATION/TRAINING PROGRAM

## 2024-03-07 PROCEDURE — 5A1935Z RESPIRATORY VENTILATION, LESS THAN 24 CONSECUTIVE HOURS: ICD-10-PCS | Performed by: STUDENT IN AN ORGANIZED HEALTH CARE EDUCATION/TRAINING PROGRAM

## 2024-03-07 PROCEDURE — 87081 CULTURE SCREEN ONLY: CPT | Mod: GEALAB

## 2024-03-07 PROCEDURE — 36415 COLL VENOUS BLD VENIPUNCTURE: CPT | Performed by: STUDENT IN AN ORGANIZED HEALTH CARE EDUCATION/TRAINING PROGRAM

## 2024-03-07 PROCEDURE — 71275 CT ANGIOGRAPHY CHEST: CPT | Performed by: RADIOLOGY

## 2024-03-07 PROCEDURE — 5A2204Z RESTORATION OF CARDIAC RHYTHM, SINGLE: ICD-10-PCS | Performed by: STUDENT IN AN ORGANIZED HEALTH CARE EDUCATION/TRAINING PROGRAM

## 2024-03-07 PROCEDURE — 87070 CULTURE OTHR SPECIMN AEROBIC: CPT | Mod: GEALAB

## 2024-03-07 PROCEDURE — 71045 X-RAY EXAM CHEST 1 VIEW: CPT | Mod: 77

## 2024-03-07 PROCEDURE — 83735 ASSAY OF MAGNESIUM: CPT | Performed by: STUDENT IN AN ORGANIZED HEALTH CARE EDUCATION/TRAINING PROGRAM

## 2024-03-07 PROCEDURE — 31720 CLEARANCE OF AIRWAYS: CPT

## 2024-03-07 PROCEDURE — 94002 VENT MGMT INPAT INIT DAY: CPT

## 2024-03-07 PROCEDURE — 82947 ASSAY GLUCOSE BLOOD QUANT: CPT

## 2024-03-07 PROCEDURE — 84100 ASSAY OF PHOSPHORUS: CPT | Performed by: STUDENT IN AN ORGANIZED HEALTH CARE EDUCATION/TRAINING PROGRAM

## 2024-03-07 PROCEDURE — 2500000004 HC RX 250 GENERAL PHARMACY W/ HCPCS (ALT 636 FOR OP/ED)

## 2024-03-07 PROCEDURE — 2020000001 HC ICU ROOM DAILY

## 2024-03-07 PROCEDURE — 99291 CRITICAL CARE FIRST HOUR: CPT | Performed by: STUDENT IN AN ORGANIZED HEALTH CARE EDUCATION/TRAINING PROGRAM

## 2024-03-07 PROCEDURE — 96361 HYDRATE IV INFUSION ADD-ON: CPT | Mod: 59

## 2024-03-07 PROCEDURE — 94760 N-INVAS EAR/PLS OXIMETRY 1: CPT

## 2024-03-07 PROCEDURE — 0BH17EZ INSERTION OF ENDOTRACHEAL AIRWAY INTO TRACHEA, VIA NATURAL OR ARTIFICIAL OPENING: ICD-10-PCS | Performed by: STUDENT IN AN ORGANIZED HEALTH CARE EDUCATION/TRAINING PROGRAM

## 2024-03-07 PROCEDURE — A4217 STERILE WATER/SALINE, 500 ML: HCPCS | Performed by: STUDENT IN AN ORGANIZED HEALTH CARE EDUCATION/TRAINING PROGRAM

## 2024-03-07 PROCEDURE — 94681 O2 UPTK CO2 OUTP % O2 XTRC: CPT

## 2024-03-07 PROCEDURE — 36600 WITHDRAWAL OF ARTERIAL BLOOD: CPT

## 2024-03-07 PROCEDURE — 85027 COMPLETE CBC AUTOMATED: CPT | Performed by: STUDENT IN AN ORGANIZED HEALTH CARE EDUCATION/TRAINING PROGRAM

## 2024-03-07 PROCEDURE — 2500000002 HC RX 250 W HCPCS SELF ADMINISTERED DRUGS (ALT 637 FOR MEDICARE OP, ALT 636 FOR OP/ED)

## 2024-03-07 PROCEDURE — 87640 STAPH A DNA AMP PROBE: CPT

## 2024-03-07 PROCEDURE — 85007 BL SMEAR W/DIFF WBC COUNT: CPT | Performed by: STUDENT IN AN ORGANIZED HEALTH CARE EDUCATION/TRAINING PROGRAM

## 2024-03-07 PROCEDURE — 87899 AGENT NOS ASSAY W/OPTIC: CPT | Mod: GEALAB

## 2024-03-07 PROCEDURE — 2500000005 HC RX 250 GENERAL PHARMACY W/O HCPCS: Performed by: STUDENT IN AN ORGANIZED HEALTH CARE EDUCATION/TRAINING PROGRAM

## 2024-03-07 PROCEDURE — 2500000001 HC RX 250 WO HCPCS SELF ADMINISTERED DRUGS (ALT 637 FOR MEDICARE OP)

## 2024-03-07 PROCEDURE — 94664 DEMO&/EVAL PT USE INHALER: CPT

## 2024-03-07 PROCEDURE — 94799 UNLISTED PULMONARY SVC/PX: CPT

## 2024-03-07 PROCEDURE — 87636 SARSCOV2 & INF A&B AMP PRB: CPT | Performed by: STUDENT IN AN ORGANIZED HEALTH CARE EDUCATION/TRAINING PROGRAM

## 2024-03-07 PROCEDURE — 31500 INSERT EMERGENCY AIRWAY: CPT

## 2024-03-07 PROCEDURE — 2550000001 HC RX 255 CONTRASTS: Performed by: STUDENT IN AN ORGANIZED HEALTH CARE EDUCATION/TRAINING PROGRAM

## 2024-03-07 PROCEDURE — 70450 CT HEAD/BRAIN W/O DYE: CPT | Performed by: RADIOLOGY

## 2024-03-07 PROCEDURE — 84132 ASSAY OF SERUM POTASSIUM: CPT | Performed by: INTERNAL MEDICINE

## 2024-03-07 RX ORDER — IPRATROPIUM BROMIDE AND ALBUTEROL SULFATE 2.5; .5 MG/3ML; MG/3ML
3 SOLUTION RESPIRATORY (INHALATION)
Status: DISCONTINUED | OUTPATIENT
Start: 2024-03-07 | End: 2024-03-13

## 2024-03-07 RX ORDER — INSULIN LISPRO 100 [IU]/ML
0-5 INJECTION, SOLUTION INTRAVENOUS; SUBCUTANEOUS EVERY 4 HOURS
Status: DISCONTINUED | OUTPATIENT
Start: 2024-03-07 | End: 2024-03-09

## 2024-03-07 RX ORDER — LEVETIRACETAM 5 MG/ML
500 INJECTION INTRAVASCULAR 2 TIMES DAILY
Status: DISCONTINUED | OUTPATIENT
Start: 2024-03-07 | End: 2024-03-10

## 2024-03-07 RX ORDER — FENTANYL CITRATE-0.9 % NACL/PF 10 MCG/ML
50-200 PLASTIC BAG, INJECTION (ML) INTRAVENOUS CONTINUOUS
Status: DISCONTINUED | OUTPATIENT
Start: 2024-03-07 | End: 2024-03-08

## 2024-03-07 RX ORDER — PROPOFOL 10 MG/ML
5-50 INJECTION, EMULSION INTRAVENOUS CONTINUOUS
Status: DISCONTINUED | OUTPATIENT
Start: 2024-03-07 | End: 2024-03-08

## 2024-03-07 RX ORDER — ROCURONIUM BROMIDE 10 MG/ML
1 INJECTION, SOLUTION INTRAVENOUS ONCE
Status: COMPLETED | OUTPATIENT
Start: 2024-03-07 | End: 2024-03-07

## 2024-03-07 RX ORDER — PANTOPRAZOLE SODIUM 40 MG/1
40 TABLET, DELAYED RELEASE ORAL
Status: DISCONTINUED | OUTPATIENT
Start: 2024-03-08 | End: 2024-03-14 | Stop reason: HOSPADM

## 2024-03-07 RX ORDER — ALBUTEROL SULFATE 0.83 MG/ML
2.5 SOLUTION RESPIRATORY (INHALATION) EVERY 6 HOURS PRN
Status: DISCONTINUED | OUTPATIENT
Start: 2024-03-07 | End: 2024-03-07

## 2024-03-07 RX ORDER — ETOMIDATE 2 MG/ML
20 INJECTION INTRAVENOUS ONCE
Status: COMPLETED | OUTPATIENT
Start: 2024-03-07 | End: 2024-03-07

## 2024-03-07 RX ORDER — PANTOPRAZOLE SODIUM 40 MG/10ML
40 INJECTION, POWDER, LYOPHILIZED, FOR SOLUTION INTRAVENOUS
Status: DISCONTINUED | OUTPATIENT
Start: 2024-03-08 | End: 2024-03-08

## 2024-03-07 RX ORDER — ENOXAPARIN SODIUM 100 MG/ML
40 INJECTION SUBCUTANEOUS EVERY 24 HOURS
Status: DISCONTINUED | OUTPATIENT
Start: 2024-03-07 | End: 2024-03-14 | Stop reason: HOSPADM

## 2024-03-07 RX ORDER — LEVETIRACETAM 10 MG/ML
2000 INJECTION INTRAVASCULAR ONCE
Status: COMPLETED | OUTPATIENT
Start: 2024-03-07 | End: 2024-03-07

## 2024-03-07 RX ORDER — ALBUTEROL SULFATE 0.83 MG/ML
2.5 SOLUTION RESPIRATORY (INHALATION) EVERY 2 HOUR PRN
Status: DISCONTINUED | OUTPATIENT
Start: 2024-03-07 | End: 2024-03-08

## 2024-03-07 RX ORDER — CEFTRIAXONE 1 G/50ML
1 INJECTION, SOLUTION INTRAVENOUS EVERY 24 HOURS
Status: DISCONTINUED | OUTPATIENT
Start: 2024-03-07 | End: 2024-03-08

## 2024-03-07 RX ORDER — SENNOSIDES 8.6 MG/1
1 TABLET ORAL 2 TIMES DAILY
Status: DISCONTINUED | OUTPATIENT
Start: 2024-03-07 | End: 2024-03-14 | Stop reason: HOSPADM

## 2024-03-07 RX ORDER — IPRATROPIUM BROMIDE AND ALBUTEROL SULFATE 2.5; .5 MG/3ML; MG/3ML
3 SOLUTION RESPIRATORY (INHALATION) EVERY 6 HOURS PRN
Status: DISCONTINUED | OUTPATIENT
Start: 2024-03-07 | End: 2024-03-07

## 2024-03-07 RX ORDER — PROPOFOL 10 MG/ML
INJECTION, EMULSION INTRAVENOUS
Status: COMPLETED
Start: 2024-03-07 | End: 2024-03-07

## 2024-03-07 RX ORDER — DEXTROSE 50 % IN WATER (D50W) INTRAVENOUS SYRINGE
25
Status: DISCONTINUED | OUTPATIENT
Start: 2024-03-07 | End: 2024-03-14 | Stop reason: HOSPADM

## 2024-03-07 RX ORDER — DEXTROSE MONOHYDRATE 100 MG/ML
0.3 INJECTION, SOLUTION INTRAVENOUS ONCE AS NEEDED
Status: DISCONTINUED | OUTPATIENT
Start: 2024-03-07 | End: 2024-03-14 | Stop reason: HOSPADM

## 2024-03-07 RX ORDER — VANCOMYCIN HYDROCHLORIDE 1 G/20ML
INJECTION, POWDER, LYOPHILIZED, FOR SOLUTION INTRAVENOUS DAILY PRN
Status: DISCONTINUED | OUTPATIENT
Start: 2024-03-07 | End: 2024-03-08

## 2024-03-07 RX ADMIN — AZITHROMYCIN MONOHYDRATE 500 MG: 500 INJECTION, POWDER, LYOPHILIZED, FOR SOLUTION INTRAVENOUS at 21:23

## 2024-03-07 RX ADMIN — ETOMIDATE INJECTION 20 MG: 2 SOLUTION INTRAVENOUS at 14:47

## 2024-03-07 RX ADMIN — ENOXAPARIN SODIUM 40 MG: 40 INJECTION SUBCUTANEOUS at 20:11

## 2024-03-07 RX ADMIN — PIPERACILLIN SODIUM AND TAZOBACTAM SODIUM 4.5 G: 4; .5 INJECTION, SOLUTION INTRAVENOUS at 16:12

## 2024-03-07 RX ADMIN — SENNOSIDES 8.6 MG: 8.6 TABLET, FILM COATED ORAL at 20:10

## 2024-03-07 RX ADMIN — ROCURONIUM BROMIDE 80 MG: 10 INJECTION, SOLUTION INTRAVENOUS at 14:48

## 2024-03-07 RX ADMIN — LEVETIRACETAM 2000 MG: 10 INJECTION INTRAVENOUS at 16:12

## 2024-03-07 RX ADMIN — LEVETIRACETAM 500 MG: 5 INJECTION INTRAVENOUS at 21:22

## 2024-03-07 RX ADMIN — VANCOMYCIN HYDROCHLORIDE 2 G: 5 INJECTION, POWDER, LYOPHILIZED, FOR SOLUTION INTRAVENOUS at 18:40

## 2024-03-07 RX ADMIN — AMIODARONE HYDROCHLORIDE 1 MG/MIN: 1.8 INJECTION, SOLUTION INTRAVENOUS at 16:15

## 2024-03-07 RX ADMIN — Medication 100 PERCENT: at 14:45

## 2024-03-07 RX ADMIN — SODIUM CHLORIDE, POTASSIUM CHLORIDE, SODIUM LACTATE AND CALCIUM CHLORIDE 1000 ML: 600; 310; 30; 20 INJECTION, SOLUTION INTRAVENOUS at 16:13

## 2024-03-07 RX ADMIN — CEFTRIAXONE SODIUM 1 G: 1 INJECTION, SOLUTION INTRAVENOUS at 20:33

## 2024-03-07 RX ADMIN — AMIODARONE HYDROCHLORIDE 0.5 MG/MIN: 1.8 INJECTION, SOLUTION INTRAVENOUS at 21:29

## 2024-03-07 RX ADMIN — IPRATROPIUM BROMIDE AND ALBUTEROL SULFATE 3 ML: 2.5; .5 SOLUTION RESPIRATORY (INHALATION) at 19:58

## 2024-03-07 RX ADMIN — PROPOFOL 10 MCG/KG/MIN: 10 INJECTION, EMULSION INTRAVENOUS at 14:55

## 2024-03-07 RX ADMIN — SODIUM CHLORIDE 1000 ML: 9 INJECTION, SOLUTION INTRAVENOUS at 20:07

## 2024-03-07 RX ADMIN — IOHEXOL 90 ML: 350 INJECTION, SOLUTION INTRAVENOUS at 15:48

## 2024-03-07 RX ADMIN — PROPOFOL 25 MCG/KG/MIN: 10 INJECTION, EMULSION INTRAVENOUS at 20:09

## 2024-03-07 RX ADMIN — INSULIN LISPRO 1 UNITS: 100 INJECTION, SOLUTION INTRAVENOUS; SUBCUTANEOUS at 20:31

## 2024-03-07 RX ADMIN — SODIUM CHLORIDE 500 ML: 9 INJECTION, SOLUTION INTRAVENOUS at 23:45

## 2024-03-07 RX ADMIN — AMIODARONE HYDROCHLORIDE 150 MG: 1.5 INJECTION, SOLUTION INTRAVENOUS at 16:12

## 2024-03-07 SDOH — SOCIAL STABILITY: SOCIAL INSECURITY: DO YOU FEEL UNSAFE GOING BACK TO THE PLACE WHERE YOU ARE LIVING?: UNABLE TO ASSESS

## 2024-03-07 SDOH — HEALTH STABILITY: PHYSICAL HEALTH
ON AVERAGE, HOW MANY DAYS PER WEEK DO YOU ENGAGE IN MODERATE TO STRENUOUS EXERCISE (LIKE A BRISK WALK)?: PATIENT UNABLE TO ANSWER

## 2024-03-07 SDOH — SOCIAL STABILITY: SOCIAL INSECURITY: DO YOU FEEL ANYONE HAS EXPLOITED OR TAKEN ADVANTAGE OF YOU FINANCIALLY OR OF YOUR PERSONAL PROPERTY?: UNABLE TO ASSESS

## 2024-03-07 SDOH — SOCIAL STABILITY: SOCIAL INSECURITY: HAVE YOU HAD THOUGHTS OF HARMING ANYONE ELSE?: UNABLE TO ASSESS

## 2024-03-07 SDOH — HEALTH STABILITY: MENTAL HEALTH: HOW MANY STANDARD DRINKS CONTAINING ALCOHOL DO YOU HAVE ON A TYPICAL DAY?: PATIENT UNABLE TO ANSWER

## 2024-03-07 SDOH — HEALTH STABILITY: MENTAL HEALTH: HOW OFTEN DO YOU HAVE A DRINK CONTAINING ALCOHOL?: PATIENT UNABLE TO ANSWER

## 2024-03-07 SDOH — SOCIAL STABILITY: SOCIAL INSECURITY: DOES ANYONE TRY TO KEEP YOU FROM HAVING/CONTACTING OTHER FRIENDS OR DOING THINGS OUTSIDE YOUR HOME?: UNABLE TO ASSESS

## 2024-03-07 SDOH — HEALTH STABILITY: PHYSICAL HEALTH: ON AVERAGE, HOW MANY MINUTES DO YOU ENGAGE IN EXERCISE AT THIS LEVEL?: PATIENT UNABLE TO ANSWER

## 2024-03-07 SDOH — HEALTH STABILITY: MENTAL HEALTH: HOW OFTEN DO YOU HAVE 6 OR MORE DRINKS ON ONE OCCASION?: PATIENT UNABLE TO ANSWER

## 2024-03-07 SDOH — ECONOMIC STABILITY: INCOME INSECURITY
IN THE LAST 12 MONTHS, WAS THERE A TIME WHEN YOU WERE NOT ABLE TO PAY THE MORTGAGE OR RENT ON TIME?: PATIENT UNABLE TO ANSWER

## 2024-03-07 SDOH — ECONOMIC STABILITY: INCOME INSECURITY
IN THE PAST 12 MONTHS, HAS THE ELECTRIC, GAS, OIL, OR WATER COMPANY THREATENED TO SHUT OFF SERVICE IN YOUR HOME?: PATIENT UNABLE TO ANSWER

## 2024-03-07 SDOH — SOCIAL STABILITY: SOCIAL INSECURITY: ARE THERE ANY APPARENT SIGNS OF INJURIES/BEHAVIORS THAT COULD BE RELATED TO ABUSE/NEGLECT?: UNABLE TO ASSESS

## 2024-03-07 SDOH — SOCIAL STABILITY: SOCIAL INSECURITY: ARE YOU OR HAVE YOU BEEN THREATENED OR ABUSED PHYSICALLY, EMOTIONALLY, OR SEXUALLY BY ANYONE?: UNABLE TO ASSESS

## 2024-03-07 SDOH — SOCIAL STABILITY: SOCIAL INSECURITY: WERE YOU ABLE TO COMPLETE ALL THE BEHAVIORAL HEALTH SCREENINGS?: NO

## 2024-03-07 SDOH — SOCIAL STABILITY: SOCIAL INSECURITY: HAS ANYONE EVER THREATENED TO HURT YOUR FAMILY OR YOUR PETS?: UNABLE TO ASSESS

## 2024-03-07 SDOH — ECONOMIC STABILITY: INCOME INSECURITY
HOW HARD IS IT FOR YOU TO PAY FOR THE VERY BASICS LIKE FOOD, HOUSING, MEDICAL CARE, AND HEATING?: PATIENT UNABLE TO ANSWER

## 2024-03-07 SDOH — SOCIAL STABILITY: SOCIAL INSECURITY: ABUSE: ADULT

## 2024-03-07 ASSESSMENT — PATIENT HEALTH QUESTIONNAIRE - PHQ9
SUM OF ALL RESPONSES TO PHQ9 QUESTIONS 1 & 2: 0
2. FEELING DOWN, DEPRESSED OR HOPELESS: NOT AT ALL
1. LITTLE INTEREST OR PLEASURE IN DOING THINGS: NOT AT ALL

## 2024-03-07 ASSESSMENT — ACTIVITIES OF DAILY LIVING (ADL)
BATHING: UNABLE TO ASSESS
TOILETING: UNABLE TO ASSESS
FEEDING YOURSELF: UNABLE TO ASSESS
PATIENT'S MEMORY ADEQUATE TO SAFELY COMPLETE DAILY ACTIVITIES?: UNABLE TO ASSESS
GROOMING: UNABLE TO ASSESS
JUDGMENT_ADEQUATE_SAFELY_COMPLETE_DAILY_ACTIVITIES: UNABLE TO ASSESS
HEARING - RIGHT EAR: UNABLE TO ASSESS
HEARING - LEFT EAR: UNABLE TO ASSESS
LACK_OF_TRANSPORTATION: PATIENT UNABLE TO ANSWER
DRESSING YOURSELF: INDEPENDENT
WALKS IN HOME: UNABLE TO ASSESS
ADEQUATE_TO_COMPLETE_ADL: UNABLE TO ASSESS
LACK_OF_TRANSPORTATION: PATIENT UNABLE TO ANSWER

## 2024-03-07 ASSESSMENT — LIFESTYLE VARIABLES
HOW OFTEN DO YOU HAVE A DRINK CONTAINING ALCOHOL: PATIENT UNABLE TO ANSWER
HOW OFTEN DO YOU HAVE 6 OR MORE DRINKS ON ONE OCCASION: PATIENT UNABLE TO ANSWER
AUDIT-C TOTAL SCORE: -1
AUDIT-C TOTAL SCORE: -1
HOW MANY STANDARD DRINKS CONTAINING ALCOHOL DO YOU HAVE ON A TYPICAL DAY: PATIENT UNABLE TO ANSWER
SKIP TO QUESTIONS 9-10: 0
AUDIT-C TOTAL SCORE: -1
SKIP TO QUESTIONS 9-10: 0

## 2024-03-07 ASSESSMENT — COLUMBIA-SUICIDE SEVERITY RATING SCALE - C-SSRS
2. HAVE YOU ACTUALLY HAD ANY THOUGHTS OF KILLING YOURSELF?: NO
1. IN THE PAST MONTH, HAVE YOU WISHED YOU WERE DEAD OR WISHED YOU COULD GO TO SLEEP AND NOT WAKE UP?: NO
6. HAVE YOU EVER DONE ANYTHING, STARTED TO DO ANYTHING, OR PREPARED TO DO ANYTHING TO END YOUR LIFE?: NO

## 2024-03-07 ASSESSMENT — COGNITIVE AND FUNCTIONAL STATUS - GENERAL: PATIENT BASELINE BEDBOUND: UNABLE TO ASSESS AT THIS TIME

## 2024-03-07 ASSESSMENT — PAIN SCALES - GENERAL
PAINLEVEL_OUTOF10: 0 - NO PAIN
PAINLEVEL_OUTOF10: 2

## 2024-03-07 ASSESSMENT — PAIN - FUNCTIONAL ASSESSMENT: PAIN_FUNCTIONAL_ASSESSMENT: CPOT (CRITICAL CARE PAIN OBSERVATION TOOL)

## 2024-03-07 NOTE — ED PROCEDURE NOTE
Procedure  Critical Care    Performed by: Jody Lucia DO  Authorized by: Jody Lucia DO    Critical care provider statement:     Critical care time (minutes):  39    Critical care time was exclusive of:  Separately billable procedures and treating other patients    Critical care was necessary to treat or prevent imminent or life-threatening deterioration of the following conditions:  CNS failure or compromise, respiratory failure and cardiac failure    Critical care was time spent personally by me on the following activities:  Blood draw for specimens, discussions with primary provider, evaluation of patient's response to treatment, examination of patient, obtaining history from patient or surrogate, ordering and performing treatments and interventions, pulse oximetry, re-evaluation of patient's condition and ventilator management    Care discussed with: admitting provider                 Jody Lucia DO  03/07/24 2708

## 2024-03-07 NOTE — ED PROVIDER NOTES
.CC: seizure, AMS    HPI:  Patient is a 64-year-old male with PMH of metastatic lung cancer not currently on chemotherapy, phantom limb pain secondary to left AKA, presenting to the ED via EMS for seizure and altered mental status.  Per EMS report, initially called for altered mental status however when they arrived patient was having witnessed generalized tonic-clonic seizure.  Seizure reportedly self aborted without medication.  Patient was placed on nonrebreather and remained postictal and route.  Rest of history gathering limited by patient's altered status.      Limitations to History: AMS    Additional History Obtained from: Documentation, EMS, and Spouse/Significant Other    Records Reviewed: Recent available ED and inpatient notes reviewed in EMR.    PMHx/PSHx:  Per HPI.   - has a past medical history of Cough variant asthma (11/14/2017), Hyperlipidemia, Lung cancer (CMS/[facility]), and Personal history of other specified conditions (12/08/2022).  - has a past surgical history that includes Leg amputation (Right); Hernia repair; and IR intervention filter placement.    Medications: Reviewed in EMR. See EMR for complete list of medications and doses.    Allergies:  Etoposide and Fosaprepitant    Social History:  - Tobacco:  reports that he quit smoking about 2 years ago. His smoking use included cigarettes. He has never used smokeless tobacco.   - Alcohol:  reports that he does not currently use alcohol.   - Illicit Drugs:  reports no history of drug use.   ???????????????????????????????????????????????????????????????  Triage Vitals:  T 36.5 °C (97.7 °F)  HR (!) 218  BP (!) 173/139  RR (!) 22  O2 100 % Supplemental oxygen    PHYSICAL EXAM:   VS: As documented in the triage note and EMR flowsheet from this visit were reviewed.  Gen: ill appearing male on stretcher  Eyes: PERRL, Clear scerla.  HENT: NC/AT, Mucosal membranes dry  Neck: Supple, no cervical LAD  Resp: Sonorous respirations on nonrebreather  CV:  Tachycardic, regular irregular rhythm  Abd: Soft, non-distended, no rigidity  Ext: s/p AKA R, no LLE edema  Skin: WWP. No systemic rashes or lesions.  MSK: normal muscle bulk, no obvious joint swelling in extremities  Neuro:  obtunded, not following commands, DRISCOLL x4 spontaneously, no obvious facial droop  Psych: altered, obtunded  ???????????????????????????????????????????????????????????????    ED Labs/Imaging:   Labs Reviewed   RESPIRATORY CULTURE/SMEAR - Abnormal       Result Value    Gram Stain No polymorphonuclear leukocytes seen (*)     Gram Stain   (*)     Value: (3+) Moderate Mixed Gram positive and Gram negative bacteria   BLOOD GAS VENOUS FULL PANEL - Abnormal    POCT pH, Venous 7.15 (*)     POCT pCO2, Venous 38 (*)     POCT pO2, Venous 47 (*)     POCT SO2, Venous 70      POCT Oxy Hemoglobin, Venous 67.4      POCT Hematocrit Calculated, Venous 44.0      POCT Sodium, Venous 132 (*)     POCT Potassium, Venous 4.8      POCT Chloride, Venous 101      POCT Ionized Calicum, Venous 1.18      POCT Glucose, Venous 200 (*)     POCT Lactate, Venous 14.4 (*)     POCT Base Excess, Venous -14.9 (*)     POCT HCO3 Calculated, Venous 13.2 (*)     POCT Hemoglobin, Venous 14.6      POCT Anion Gap, Venous 23.0      Patient Temperature        FiO2 21     BLOOD GAS LACTIC ACID, VENOUS - Abnormal    POCT Lactate, Venous 3.7 (*)    CBC WITH AUTO DIFFERENTIAL - Abnormal    WBC 26.3 (*)     nRBC 0.1 (*)     RBC 4.65      Hemoglobin 14.5      Hematocrit 45.6      MCV 98      MCH 31.2      MCHC 31.8 (*)     RDW 15.0 (*)     Platelets 494 (*)     Immature Granulocytes %, Automated 6.7 (*)     Immature Granulocytes Absolute, Automated 1.77 (*)     Narrative:     The previously reported component Neutrophils % is no longer being reported.  The previously reported component Lymphocytes % is no longer being reported.  The previously reported component Monocytes % is no longer being reported.  The previously   reported component  Eosinophils % is no longer being reported.  The previously reported component Basophils % is no longer being reported.  The previously reported component Absolute Neutrophils is no longer being reported.  The previously reported   component Absolute Lymphocytes is no longer being reported.  The previously reported component Absolute Monocytes is no longer being reported.  The previously reported component Absolute Eosinophils is no longer being reported.  The previously reported   component Absolute Basophils is no longer being reported.   COMPREHENSIVE METABOLIC PANEL - Abnormal    Glucose 188 (*)     Sodium 139      Potassium 3.9      Chloride 95 (*)     Bicarbonate 14 (*)     Anion Gap 34 (*)     Urea Nitrogen 22      Creatinine 1.15      eGFR 72      Calcium 9.5      Albumin 4.4      Alkaline Phosphatase 68      Total Protein 7.6      AST 25      Bilirubin, Total 0.5      ALT 25     MAGNESIUM - Abnormal    Magnesium 2.45 (*)    PHOSPHORUS - Abnormal    Phosphorus 5.1 (*)    LACTATE - Abnormal    Lactate 15.6 (*)     Narrative:     Venipuncture immediately after or during the administration of Metamizole may lead to falsely low results. Testing should be performed immediately  prior to Metamizole dosing.   PROTIME-INR - Abnormal    Protime 14.1 (*)     INR 1.3 (*)    BLOOD GAS VENOUS FULL PANEL - Abnormal    POCT pH, Venous 7.25 (*)     POCT pCO2, Venous 55 (*)     POCT pO2, Venous 38      POCT SO2, Venous 57      POCT Oxy Hemoglobin, Venous 55.8      POCT Hematocrit Calculated, Venous 41.0      POCT Sodium, Venous 130 (*)     POCT Potassium, Venous 4.1      POCT Chloride, Venous 102      POCT Ionized Calicum, Venous 1.12      POCT Glucose, Venous 163 (*)     POCT Lactate, Venous 3.7 (*)     POCT Base Excess, Venous -3.9 (*)     POCT HCO3 Calculated, Venous 24.1      POCT Hemoglobin, Venous 13.5      POCT Anion Gap, Venous 8.0 (*)     Patient Temperature        FiO2 60     URINALYSIS WITH REFLEX CULTURE AND  MICROSCOPIC - Abnormal    Color, Urine Yellow      Appearance, Urine Hazy (*)     Specific Gravity, Urine 1.055 (*)     pH, Urine 6.0      Protein, Urine 30 (1+) (*)     Glucose, Urine NEGATIVE      Blood, Urine MODERATE (2+) (*)     Ketones, Urine 20 (1+) (*)     Bilirubin, Urine NEGATIVE      Urobilinogen, Urine <2.0      Nitrite, Urine NEGATIVE      Leukocyte Esterase, Urine NEGATIVE     BLOOD GAS ARTERIAL FULL PANEL - Abnormal    POCT pH, Arterial 7.42      POCT pCO2, Arterial 35 (*)     POCT pO2, Arterial 264 (*)     POCT SO2, Arterial 100      POCT Oxy Hemoglobin, Arterial 98.4 (*)     POCT Hematocrit Calculated, Arterial 38.0 (*)     POCT Sodium, Arterial 132 (*)     POCT Potassium, Arterial 3.9      POCT Chloride, Arterial 102      POCT Ionized Calcium, Arterial 1.16      POCT Glucose, Arterial 146 (*)     POCT Lactate, Arterial 1.5      POCT Base Excess, Arterial -1.3      POCT HCO3 Calculated, Arterial 22.7      POCT Hemoglobin, Arterial 12.8 (*)     POCT Anion Gap, Arterial 11      Patient Temperature        FiO2 80      Test Comment 28/500/.80/+5      Narrative:     28/500/.80/+5   PROCALCITONIN - Abnormal    Procalcitonin 0.13 (*)     Narrative:     Procalcitonin (PCT) results measured serially can  aid in decision-making for antibiotic discontinuation in  patients with suspected or confirmed sepsis in conjunction  with additional clinical information. Antibiotic  discontinuation may be considered with a change in PCT of  >80% from the peak result or when PCT falls below 0.50 ng/mL.    Procalcitonin results should not be used in isolation but  should be interpreted in conjunction with additional clinical  and laboratory findings. Procalcitonin results should not be  used to guide the initiation of antibiotic therapy.    Falsely low PCT values in the presence of bacterial infection  may occur in early infection, with atypical pathogens,  localized infections, and subacute infectious  endocarditis.    Falsely elevated results outside of severe bacterial  infection/sepsis may be seen in patients with renal failure  or insufficiency, severe trauma or burns, recent major  abdominal/cardiac surgery, acute multi-organ failure, rarely  in patients with medullary thyroid carcinoma and rare  neuroendocrine tumors, and non-specific interfering antibodies  (heterophile antibodies, rheumatoid factor, human anti-mouse  antibodies (HAMA), etc).    Performance of the PCT test in pediatric patients (<17yo),  pregnant women, immunocompromised patients, and patients on  immunomodulatory medications has not been evaluated.   CBC WITH AUTO DIFFERENTIAL - Abnormal    WBC 14.6 (*)     nRBC 0.0      RBC 3.85 (*)     Hemoglobin 11.8 (*)     Hematocrit 36.3 (*)     MCV 94      MCH 30.6      MCHC 32.5      RDW 15.4 (*)     Platelets 250      Neutrophils % 66.8      Immature Granulocytes %, Automated 1.8 (*)     Lymphocytes % 20.2      Monocytes % 10.1      Eosinophils % 0.2      Basophils % 0.9      Neutrophils Absolute 9.73 (*)     Immature Granulocytes Absolute, Automated 0.26      Lymphocytes Absolute 2.95      Monocytes Absolute 1.47 (*)     Eosinophils Absolute 0.03      Basophils Absolute 0.13 (*)    COMPREHENSIVE METABOLIC PANEL - Abnormal    Glucose 86      Sodium 135 (*)     Potassium 3.2 (*)     Chloride 105      Bicarbonate 21      Anion Gap 12      Urea Nitrogen 20      Creatinine 1.17      eGFR 70      Calcium 7.8 (*)     Albumin 3.1 (*)     Alkaline Phosphatase 41      Total Protein 5.3 (*)     AST 19      Bilirubin, Total 0.3      ALT 19     BLOOD GAS VENOUS FULL PANEL - Abnormal    POCT pH, Venous 7.36      POCT pCO2, Venous 43      POCT pO2, Venous 32 (*)     POCT SO2, Venous 47      POCT Oxy Hemoglobin, Venous 46.1      POCT Hematocrit Calculated, Venous 36.0 (*)     POCT Sodium, Venous 134 (*)     POCT Potassium, Venous 3.3 (*)     POCT Chloride, Venous 103      POCT Ionized Calicum, Venous 1.15       POCT Glucose, Venous 79      POCT Lactate, Venous 1.1      POCT Base Excess, Venous -1.2      POCT HCO3 Calculated, Venous 24.3      POCT Hemoglobin, Venous 12.0 (*)     POCT Anion Gap, Venous 10.0      Patient Temperature        FiO2 50      Test Comment 28/500/.50/+5     BLOOD GAS ARTERIAL FULL PANEL - Abnormal    POCT pH, Arterial 7.36 (*)     POCT pCO2, Arterial 35 (*)     POCT pO2, Arterial 93      POCT SO2, Arterial 99      POCT Oxy Hemoglobin, Arterial 96.5      POCT Hematocrit Calculated, Arterial 35.0 (*)     POCT Sodium, Arterial 133 (*)     POCT Potassium, Arterial 3.3 (*)     POCT Chloride, Arterial 107      POCT Ionized Calcium, Arterial 1.16      POCT Glucose, Arterial 103 (*)     POCT Lactate, Arterial 1.2      POCT Base Excess, Arterial -5.0 (*)     POCT HCO3 Calculated, Arterial 19.8 (*)     POCT Hemoglobin, Arterial 11.7 (*)     POCT Anion Gap, Arterial 10      Patient Temperature        FiO2 40      Peep CHM2O 5.0      Pressure Support 5     CBC - Abnormal    WBC 12.2 (*)     nRBC 0.0      RBC 3.68 (*)     Hemoglobin 11.6 (*)     Hematocrit 35.3 (*)     MCV 96      MCH 31.5      MCHC 32.9      RDW 15.6 (*)     Platelets 211     COMPREHENSIVE METABOLIC PANEL - Abnormal    Glucose 86      Sodium 141      Potassium 2.9 (*)     Chloride 107      Bicarbonate 26      Anion Gap 11      Urea Nitrogen 13      Creatinine 0.90      eGFR >90      Calcium 8.1 (*)     Albumin 3.2 (*)     Alkaline Phosphatase 44      Total Protein 5.6 (*)     AST 27      Bilirubin, Total 0.6      ALT 18     POCT GLUCOSE - Abnormal    POCT Glucose 189 (*)    POCT GLUCOSE - Abnormal    POCT Glucose 165 (*)    POCT GLUCOSE - Abnormal    POCT Glucose 109 (*)    POCT GLUCOSE - Abnormal    POCT Glucose 111 (*)    MANUAL DIFFERENTIAL - Abnormal    Neutrophils %, Manual 79.0      Bands %, Manual 3.0      Lymphocytes %, Manual 10.0      Monocytes %, Manual 3.0      Eosinophils %, Manual 0.0      Basophils %, Manual 0.0      Atypical  Lymphocytes %, Manual 1.0      Metamyelocytes %, Manual 2.0      Myelocytes %, Manual 2.0      Seg Neutrophils Absolute, Manual 20.78 (*)     Bands Absolute, Manual 0.79 (*)     Lymphocytes Absolute, Manual 2.63      Monocytes Absolute, Manual 0.79      Eosinophils Absolute, Manual 0.00      Basophils Absolute, Manual 0.00      Atypical Lymphs Absolute, Manual 0.26      Metamyelocytes Absolute, Manual 0.53      Myelocytes Absolute, Manual 0.53      Total Cells Counted 100      Neutrophils Absolute, Manual 21.57 (*)     RBC Morphology See Below      Polychromasia Mild      Clumped Platelets Present     URINALYSIS MICROSCOPIC WITH REFLEX CULTURE - Abnormal    WBC, Urine NONE      RBC, Urine >20 (*)     Squamous Epithelial Cells, Urine 1-9 (SPARSE)      Bacteria, Urine 1+ (*)    BLOOD CULTURE - Normal    Blood Culture No growth at 1 day     STAPHYLOCOCCUS AUREUS/MRSA COLONIZATION, CULTURE - Normal    Staph/MRSA Screen Culture No Staphylococcus aureus isolated     LEGIONELLA ANTIGEN, URINE - Normal    L. pneumophila Urine Ag Negative     STREPTOCOCCUS PNEUMONIAE ANTIGEN, URINE - Normal    Streptococcus pneumoniae Ag, Urine Negative     MRSA SURVEILLANCE FOR VANCOMYCIN DE-ESCALATION, PCR - Normal    MRSA PCR Not Detected      Narrative:     This assay is an FDA-approved in vitro diagnostic nucleic acid amplification test for the detection of methicillin-resistant Staphylococcus aureus (MRSA) DNA directly from nasal swabs in patients at risk for nasal colonization. MRSA NxG is intended to aid in the prevention and control of MRSA infections in healthcare settings. This assay is NOT intended to diagnose, guide, or monitor treatment for MRSA infections, or provide results of susceptibility to methicillin. A negative result does not preclude MRSA nasal colonization. Test performance has not been evaluated in patients less than two years of age.   BLOOD CULTURE - Normal    Blood Culture No growth at 1 day     SARS-COV-2 AND  INFLUENZA A/B PCR - Normal    Flu A Result Not Detected      Flu B Result Not Detected      Coronavirus 2019, PCR Not Detected      Narrative:     This assay has received FDA Emergency Use Authorization (EUA) and  is only authorized for the duration of time that circumstances exist to justify the authorization of the emergency use of in vitro diagnostic tests for the detection of SARS-CoV-2 virus and/or diagnosis of COVID-19 infection under section 564(b)(1) of the Act, 21 U.S.C. 360bbb-3(b)(1). Testing for SARS-CoV-2 is only recommended for patients who meet current clinical and/or epidemiological criteria as defined by federal, state, or local public health directives. This assay is an in vitro diagnostic nucleic acid amplification test for the qualitative detection of SARS-CoV-2, Influenza A, and Influenza B from nasopharyngeal specimens and has been validated for use at [facility]. Negative results do not preclude COVID-19 infections or Influenza A/B infections, and should not be used as the sole basis for diagnosis, treatment, or other management decisions. If Influenza A/B and RSV PCR results are negative, testing for Parainfluenza virus, Adenovirus and Metapneumovirus is routinely performed for [facility] pediatric oncology and intensive care inpatients, and is available on other patients by placing an add-on request.    MAGNESIUM - Normal    Magnesium 2.01     PHOSPHORUS - Normal    Phosphorus 3.7     CREATINE KINASE - Normal    Creatine Kinase 121     TROPONIN I, HIGH SENSITIVITY - Normal    Troponin I, High Sensitivity 18      Narrative:     Less than 99th percentile of normal range cutoff-  Female and children under 18 years old <14 ng/L; Male <21 ng/L: Negative  Repeat testing should be performed if clinically indicated.     Female and children under 18 years old 14-50 ng/L; Male 21-50 ng/L:  Consistent with possible cardiac damage and possible increased clinical   risk. Serial  measurements may help to assess extent of myocardial damage.     >50 ng/L: Consistent with cardiac damage, increased clinical risk and  myocardial infarction. Serial measurements may help assess extent of   myocardial damage.      NOTE: Children less than 1 year old may have higher baseline troponin   levels and results should be interpreted in conjunction with the overall   clinical context.     NOTE: Troponin I testing is performed using a different   testing methodology at [facility] than at other   [facility]. Direct result comparisons should only   be made within the same method.   LACTATE - Normal    Lactate 0.9      Narrative:     Venipuncture immediately after or during the administration of Metamizole may lead to falsely low results. Testing should be performed immediately  prior to Metamizole dosing.   MAGNESIUM - Normal    Magnesium 1.91     POCT GLUCOSE - Normal    POCT Glucose 83     POCT GLUCOSE - Normal    POCT Glucose 83     POCT GLUCOSE - Normal    POCT Glucose 92     POCT GLUCOSE - Normal    POCT Glucose 95     URINALYSIS WITH REFLEX CULTURE AND MICROSCOPIC    Narrative:     The following orders were created for panel order Urinalysis with Reflex Culture and Microscopic.  Procedure                               Abnormality         Status                     ---------                               -----------         ------                     Urinalysis with Reflex C...[[id number]]  Abnormal            Final result               Extra Urine Gray Tube[[id number]]                            Final result                 Please view results for these tests on the individual orders.   EXTRA URINE GRAY TUBE    Extra Tube Hold for add-ons.     PROCALCITONIN     XR chest 1 view   Final Result   1.  New enteric tube courses inferior to the diaphragm, with the tip   coiling in the left upper quadrant, in the expected location of the   gastric body. Some advancement of the endotracheal tube  since prior   imaging, tip of which overlies the carlyle by 1.1 cm. Slight   retraction can be considered.   2. Asymmetric diminished volume of the left lung compared to the   contralateral right side, with persistent small left-sided pleural   effusion and associated atelectatic changes. No new consolidation or   pleural effusion is present in the right lung.                  MACRO:   None        Signed by: Bhargav Malloy 3/7/2024 8:10 PM   Dictation workstation:   [id number]      CT angio chest for pulmonary embolism   Final Result   1. No pulmonary embolism.   2. History of lung carcinoma with a single large lobular mass or   conglomeration of masses in the left hilum extending into the left   upper lobe invading the mediastinum. There is associated mediastinal   adenopathy. There is adenopathy in the left lower neck. There is   occlusion of the proximal left upper lobe mainstem bronchus. There is   narrowing of the proximal segmental arteries to the left upper lobe.   3. There is moderate elevation left hemidiaphragm.   4. Bibasilar atelectasis.   5. Endotracheal tube in satisfactory position        MACRO:   None        Signed by: Mili Arias 3/7/2024 4:10 PM   Dictation workstation:   [id number]      CT head wo IV contrast   Final Result   No acute intracranial finding. MRI with and without IV contrast may   be obtained if clinical suspicion for intracranial metastasis is high.        Signed by: Tyree Llamas 3/7/2024 3:49 PM   Dictation workstation:   [id number]      XR chest 1 view   Final Result   1. Status post intubation with the tip of the tube just above the   clavicles as above.   2. Elevation left hemidiaphragm with left perihilar and basilar   atelectasis and/or infiltrate.        MACRO:   none        Signed by: Darek German 3/7/2024 3:16 PM   Dictation workstation:   [id number]      MR brain w and wo IV contrast    (Results Pending)       ED Course & MDM   ED Course as of 03/09/24  1100   Thu Mar 07, 2024   1651 EKG shows a narrow complex tachycardia at 209 ms.  Left axis deviation.  Nonspecific ST segment changes likely due to significant tachycardia. [HD]   1651 Patient cardioverted 3 times without success.  He was synchronized cardioverted at 360 J.  Attempted to use procainamide but it is currently on backorder therefore patient given amiodarone. [HD]   1651 Significant leukocytosis and elevated lactate and after discussion with his wife states being treated for outpatient pneumonia with doxycycline patient covered with vancomycin and Zosyn. [HD]   1652 Gas shows significantly improved lactate does have a mild respiratory acidosis and will increase his respiratory rate on ventilator. [HD]      ED Course User Index  [HD] Jody Lucia,          Diagnoses as of 03/09/24 1100   Seizure (CMS/[facility])       Medical Decision Making:  This is a 64-year-old male with history of metastatic cancer with altered mental status and seizure.  Patient arrives via EMS on nonrebreather with sonorous respirations.  Patient moving all extremities equally however is not following commands, and is having desaturations and concern for airway protection.  Point-of-care glucose within normal limits.  Patient was intubated for failure to protect airway, anticipated clinical course, and hypoxia.  Please see separate procedure note for details.  Patient also found to be in rapid A-fib in the 200s, unstable, therefore cardioversion attempt after x 3 was unsuccessful.  Procainamide pharmacy.  Patient was given amiodarone.  Patient was taken emergently for imaging including head given seizure and cancer history.  Patient was also started on broad-spectrum antibiotics.  Prophylactically for sepsis given critically ill state without exact cause.  Wife and family updated at bedside and all questions were answered.  Will be admitted to the ICU for further workup and management.      Social Determinants Limiting Care:  None  identified    Disposition:  ICU admit    Patient seen and discussed with attending physician.    Jenny Ayers MD PGY3  Emergency Medicine      Procedures ? SmartLinks last updated 3/9/2024 11:00 AM          Jenny Ayers MD  Resident  03/09/24 1120

## 2024-03-07 NOTE — ED PROCEDURE NOTE
Procedure  Intubation    Performed by: Jenny Ayers MD  Authorized by: Jody Lucia DO    Consent:     Consent obtained:  Emergent situation  Universal protocol:     Patient identity confirmed:  Arm band and provided demographic data  Pre-procedure details:     Indications: airway protection, altered consciousness and respiratory failure      Patient status:  Altered mental status    Look externally: large incisors      Obstruction: none      Neck mobility: normal      Pharmacologic strategy: RSI      Induction agents:  Etomidate    Paralytics:  Rocuronium  Procedure details:     Preoxygenation:  Nonrebreather mask    Number of attempts:  1  Successful intubation attempt details:     Intubation method:  Oral    Intubation technique: video assisted      Laryngoscope blade:  Hypercurved    Bougie used: no      Grade view: I      Tube size (mm):  7.0    Tube type:  Cuffed    Tube visualized through cords: yes    Placement assessment:     ETT at teeth/gumline (cm):  23 at gumline    Tube secured with:  ETT ferrer    Breath sounds:  Equal    Placement verification: chest rise, colorimetric ETCO2, CXR verification, direct visualization and equal breath sounds      CXR findings:  Appropriate position  Post-procedure details:     Procedure completion:  Tolerated well, no immediate complications               Jenny Ayers MD  Resident  03/07/24 8163

## 2024-03-07 NOTE — ED PROCEDURE NOTE
Procedure  Electrical Cardioversion    Performed by: Jenny Ayers MD  Authorized by: Jody Lucia DO    Consent:     Consent obtained:  Emergent situation  Universal protocol:     Patient identity confirmed:  Arm band, provided demographic data and hospital-assigned identification number  Pre-procedure details:     Cardioversion basis:  Emergent    Rhythm:  Atrial fibrillation  Attempt one:     Cardioversion mode:  Synchronous    Shock (Joules):  360    Shock outcome:  No change in rhythm  Attempt two:     Cardioversion mode:  Synchronous    Shock (Joules):  360    Shock outcome:  No change in rhythm  Attempt three:     Cardioversion mode:  Synchronous (changed to anterior/posterior placement of pads)    Shock (Joules):  360    Shock outcome:  No change in rhythm  Post-procedure details:     Procedure completion:  Tolerated well, no immediate complications               Jenny Ayers MD  Resident  03/07/24 [id number]

## 2024-03-07 NOTE — H&P
Intensive Care Unit:  Initial History and Physical Note                          Patient: Mike Franco, Age: 64 y.o., SEX: male, MRN:[id number], ROOM:JUAN/JUAN                                                                   Code:Full Code                                                               Admitted On: 3/7/2024                                                               Admitting Dx: No admission diagnoses are documented for this encounter.                                                                         PCP: Real Nichols MD                                                                 Attending: Jody Lucia DO        Subjective:    No chief complaint on file.      HPI:   Mike Franco is a 64 y.o. 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   Pt presented to the ED 3 days ago for cough and SOB that has lasted one month, and imaging concerning for worsening lung cancer with questionable areas of pneumonitis. Pt was stable and was discharged home with doxycycline for CAP with urgent outpt follow up with PCP and oncologist.     Today, pt presented to ED with altered mental status and seizure. Per family, pt has been feeling sick with fatigue and a cough, and feeling too weak to get out of bed. Wife went to bedroom and found the pt extremely lethargic and unarousable, and called EMS. Before the squad arrived to the home, the pt's entire body started shaking. He has never had seizures before.    ED course:    Pt with new-onset seizure and was postictal during his stay. He was intubated to protect his airway. He was also tachycardic and in new-onset a-fib with RVR with rate over 200, pt was  cardioverted x3 at 360 J without success, and started on an amiodarone drip.  Glucose 188, Na 139, K 3.9, bicarb 14, lactate 15.6, INR 1.3, WBC 26.3, Hgb 14.5, platelet 494. UA with moderate blood, 1+ bacteria, no LE or nitrites.   Imaging: CXR: s/p intubation, left perihilar and basilar atelectasis or infiltrate. CTA chest: no PE; large lobular mass in left hilum extending in to upper lobe invading mediastinum, associated mediastinal adenopathy, and left lower neck adenopathy; Occlusion of proximal left upper lobe mainstem bronchus and narrowing of proximal segmental arteries to left upper lobe; bibasilar atelectasis. CT head: no acute intracranial finding.   Pt was given vancomycin and zosyn in the ED to cover for CAP.   Last VBG had pH 7.25, pCO2 55, pO2 38, respiratory rate increased from 22 to 28 and pt was transferred to the ICU.     ICU Arrival:   Pt arrived on ventilator with propofol, fentanyl, and on amiodarone drip. Pt was in normal sinus rhythm on arrival. OG tube placed, blood cultures sent from ICU. Hypotensive 89/64, given 1L NS bolus. History provided by wife and daughter.     Of note: Recent MRI brain (1/12/24) showed no evidence of parenchymal metastatic disease; punctate enhancing nodule in right internal auditory canal near basal turn of cochlear that may reflect a small schwannoma, less likely metastatic focus; no other evidence to suggest leptomeningeal metastatic disease    ROS: 12 points review of system is negative except as stated in the HPI above.     Past Medical History:     has a past medical history of Cough variant asthma (11/14/2017), Hyperlipidemia, Lung cancer (CMS/HCC), and Personal history of other specified conditions (12/08/2022).       Allergies:   Allergies   Allergen Reactions    Etoposide Shortness of breath and Other     Rigors  See adverse drug reaction note- 2/7/24    Fosaprepitant Hives, Shortness of breath and Nausea/vomiting      Meds: (Not in a hospital  admission)      Past surgical history:      Past Surgical History:   Procedure Laterality Date    HERNIA REPAIR      IR INTERVENTION FILTER PLACEMENT      LEG AMPUTATION Right         Social history:      Social History     Socioeconomic History    Marital status: Single     Spouse name: Not on file    Number of children: Not on file    Years of education: Not on file    Highest education level: Not on file   Occupational History    Not on file   Tobacco Use    Smoking status: Former     Types: Cigarettes     Quit date:      Years since quittin.1    Smokeless tobacco: Never   Substance and Sexual Activity    Alcohol use: Not Currently     Comment: social    Drug use: Never    Sexual activity: Defer   Other Topics Concern    Not on file   Social History Narrative    Not on file     Social Determinants of Health     Financial Resource Strain: Low Risk  (2024)    Overall Financial Resource Strain (CARDIA)     Difficulty of Paying Living Expenses: Not hard at all   Food Insecurity: Not on file   Transportation Needs: No Transportation Needs (2024)    PRAPARE - Transportation     Lack of Transportation (Medical): No     Lack of Transportation (Non-Medical): No   Physical Activity: Not on file   Stress: Not on file   Social Connections: Not on file   Intimate Partner Violence: Not on file   Housing Stability: Low Risk  (2024)    Housing Stability Vital Sign     Unable to Pay for Housing in the Last Year: No     Number of Places Lived in the Last Year: 1     Unstable Housing in the Last Year: No        Current medications:      Current Facility-Administered Medications:     amiodarone (Nexterone) 360 mg in dextrose,iso-osm 200 mL (1.8 mg/mL) infusion (premix), 0.5-1 mg/min, intravenous, Continuous, Jody Lucia DO, Last Rate: 33.3 mL/hr at 24 1615, 1 mg/min at 24 1615    enoxaparin (Lovenox) syringe 40 mg, 40 mg, subcutaneous, q24h, Maricel Quinones DO    fentanyl (Sublimaze) 1000 mcg  in sodium chloride 0.9% 100 mL (10 mcg/mL) infusion (premix),  mcg/hr, intravenous, Continuous, Jody Lucia DO    lactated Ringer's bolus 1,000 mL, 1,000 mL, intravenous, Once, Jenyn Ayers MD    oxygen (O2) therapy, , inhalation, Continuous PRN - O2/gases, Jody Lucia DO, 100 percent at 03/07/24 1445    propofol (Diprivan) infusion, 5-50 mcg/kg/min, intravenous, Continuous, Jody Lucia DO, Last Rate: 4.82 mL/hr at 03/07/24 1455, 10 mcg/kg/min at 03/07/24 1455    vancomycin (Vancocin) 2000 mg/500 ml in D5W 500 mL IV piggyback 2 g, 2 g, intravenous, Once, Jenny Ayers MD    Current Outpatient Medications:     albuterol 2.5 mg /3 mL (0.083 %) nebulizer solution, Take 3 mL by nebulization every 6 hours if needed (For respiratory rate GREATER THAN OR EQUAL TO 28 breaths/minute and/or wheezing.), Disp: 75 mL, Rfl: 11    albuterol 90 mcg/actuation inhaler, Inhale 2 puffs every 6 hours if needed for wheezing., Disp: 18 g, Rfl: 11    ALPRAZolam (Xanax) 0.25 mg tablet, Take 1 tablet (0.25 mg) by mouth 3 times a day as needed for anxiety., Disp: , Rfl:     dextromethorphan-guaifenesin (Mucinex DM)  mg 12 hr tablet, Take 1 tablet by mouth every 12 hours. Do not crush, chew, or split., Disp: , Rfl:     doxycycline (Adoxa) 100 mg tablet, Take 1 tablet (100 mg) by mouth 2 times a day for 10 days. Take with a full glass of water and do not lie down for at least 30 minutes after, Disp: 20 tablet, Rfl: 0    EPINEPHrine 0.3 mg/0.3 mL injection syringe, Inject 0.3 mL (0.3 mg) into the muscle. As Directed, Disp: , Rfl:     gabapentin (Neurontin) 100 mg capsule, Take 1 capsule (100 mg) by mouth as needed at bedtime (phantom limb pain)., Disp: , Rfl:     LORazepam (Ativan) 0.5 mg tablet, Take 1 tablet before test - may repeat it once, Disp: 30 tablet, Rfl: 0    multivitamin tablet, Take 1 tablet by mouth once daily., Disp: , Rfl:     OLANZapine (ZyPREXA) 5 mg tablet, Take 1 tablet (5 mg) by mouth once  daily at bedtime. For 4 days starting the evening of treatment, Disp: 4 tablet, Rfl: 3    ondansetron (Zofran) 8 mg tablet, Take 1 tablet (8 mg) by mouth every 8 hours if needed for nausea or vomiting., Disp: 30 tablet, Rfl: 5    oxyCODONE (Roxicodone) 5 mg immediate release tablet, Take 1 tablet (5 mg) by mouth every 6 hours if needed for moderate pain (4 - 6)., Disp: 30 tablet, Rfl: 0    pantoprazole (ProtoNix) 40 mg EC tablet, Do not crush, chew, or split. Take it 30 min before breakfast daily, Disp: 30 tablet, Rfl: 5    prochlorperazine (Compazine) 10 mg tablet, Take 1 tablet (10 mg) by mouth every 6 hours if needed for nausea or vomiting., Disp: 30 tablet, Rfl: 5    rosuvastatin (Crestor) 5 mg tablet, Take 1 tablet (5 mg) by mouth once daily., Disp: , Rfl:       Objective:    Vitals: Blood pressure 110/77, pulse (!) 111, resp. rate (!) 32, SpO2 100 %. .vit  I/Os: No intake or output data in the 24 hours ending 03/07/24 1719    Vent Mode: Volume control/assist control  FiO2 (%):  [100 %] 100 %  S RR:  [22] 22  S VT:  [500 mL] 500 mL  PEEP/CPAP (cm H2O):  [5 cm H20] 5 cm H20  MAP (cm H2O):  [9.9] 9.9     Physical Examination:  Constitutional: patient was seen and examined at the bedside, intubated on ventilator  Eyes: PERRL bilaterally   Head: atraumatic, normocephalic  Heart: RRR, no M/R/G  Lungs: CTA, b/l, no W/R/R  Abdomen: soft, ND, NT, + BS in all 4 quadrants   Extremities: right AKA. no edema, ecchymoses, erythema. Left LE pulse via doppler  Neuro: intubated, sedated, limited exam,  Psych: unable to assess, medically sedated.      Laboratory:    Laboratory finding:   Lab Results   Component Value Date    WBC 26.3 (H) 03/07/2024    WBC 8.9 03/04/2024    WBC 8.6 03/04/2024    HGB 14.5 03/07/2024    HGB 13.4 (L) 03/04/2024    HGB 14.3 03/04/2024    MCV 98 03/07/2024    MCV 97 03/04/2024    MCV 95 03/04/2024     (H) 03/07/2024     03/04/2024     03/04/2024    HCT 45.6 03/07/2024    HCT 40.8  (L) 03/04/2024    HCT 44.3 03/04/2024     Lab Results   Component Value Date     03/07/2024     03/04/2024     03/04/2024    K 3.9 03/07/2024    K 4.0 03/04/2024    K 4.1 03/04/2024    BUN 22 03/07/2024    BUN 19 03/04/2024    BUN 19 03/04/2024    CREATININE 1.15 03/07/2024    CREATININE 0.84 03/04/2024    CREATININE 0.90 03/04/2024    CL 95 (L) 03/07/2024     03/04/2024     03/04/2024    AKD7QZA 24.1 03/07/2024    FLE8YJP 13.2 (L) 03/07/2024    GOP3SBP 33.1 (H) 03/04/2024     Lab Results   Component Value Date    TROPHS <3 03/04/2024    TROPHS 3 08/21/2022    TROPHS <3 08/21/2022    BNP 13 03/04/2024    BNP 19 04/04/2020    DDIMERVTE 881 (H) 03/04/2024    DDIMERVTE 1,895 (A) 04/04/2020     Lab Results   Component Value Date    TSH 2.35 03/04/2024    TSH 1.96 02/06/2024    TSH 1.31 12/05/2023    MG 2.45 (H) 03/07/2024    MG 1.80 03/04/2024    MG 2.10 01/15/2024    PHOS 5.1 (H) 03/07/2024    PHOS 2.8 01/15/2024    PHOS 3.0 01/15/2024        Imaging:   CT angio chest for pulmonary embolism  Narrative: Interpreted By:  Mili Arias,   STUDY:  CT ANGIO CHEST FOR PULMONARY EMBOLISM;  3/7/2024 3:46 pm      INDICATION:  Signs/Symptoms:afib RVR hx lung cancer.      COMPARISON:  Chest radiograph 03/07/2024, chest CT 03/04/2024, 02/23/2024,  12/14/2023      ACCESSION NUMBER(S):  [id number]      ORDERING CLINICIAN:  ANDRY CAMEJO      TECHNIQUE:  Helical data acquisition of the chest was obtained  90 mL Omnipaque 350.  Images were reformatted in axial, coronal, and sagittal planes.  3D MIP images were generated and reviewed.      FINDINGS:  LUNGS and AIRWAYS:  Known history of primary lung carcinoma.  There is a conglomeration of masses or 1 large lobular mass in the  left hilum extending along the left upper paramediastinal region  invading the mediastinum. Combined, this measures 9.7 x 5.5 cm. This  was more masslike and lobular on 12/14/2023. On that study, this  measured 10.0 x 6.0 cm.  There is occlusion of the proximal left upper  lobe bronchus seen previously on 03/04/2024.  there is narrowing of  the proximal segmental arteries to the left upper lobe There is  associated moderate elevation of the left hemidiaphragm. There is  peripheral right lower lobe atelectasis, new compared to 03/04/2024.  There is peripheral left lower lobe atelectasis, unchanged compared  to 03/24/2024. There is bandlike atelectasis in the lingula.  There are emphysematous changes of the lungs with biapical  thin-walled blebs and bullae. There are chronic interstitial changes  of the lungs.      VAL AND MEDIASTINUM:  There is mediastinal adenopathy in the prevascular space superior  mediastinum left hilum may aortopulmonary window and subcarinal  space. There are enlarged lymph nodes in the left lower neck. The  largest measures 1.5 x 1.1 cm There is an endotracheal tube with the  tip in the proximal trachea      HEART and VESSELS:  There is no thoracic aortic aneurysm or dissection.      There is good opacification of the pulmonary arterial system with no  embolism.      There is atherosclerotic calcification of the native coronary  arteries the study is not optimized for evaluation of coronary  arteries.      The cardiac chambers are not enlarged.      There is a small pericardial effusion, new.      UPPER ABDOMEN:  There are gastrohepatic ligament lymph nodes.  There is a 1.3 x 1.1 cm gastrohepatic ligament lymph node.  There is a 1.4 x 1.1 cm gastrohepatic ligament lymph node.  There is a 0.9 x 0.8 cm gastrohepatic ligament lymph node.  There is a 1.3 x 0.9 cm gastrohepatic ligament lymph node.  These have been seen on the prior studies.  The gallbladder is not completely included but there are gallstones.  There is partial visualization of a heavily calcified 1.8 cm  gallstone with central air.          CHEST WALL and OSSEOUS STRUCTURES:  There are no suspicious osseous lesions. Multilevel degenerative  changes are  present There is a remote healed right lower lateral rib  fracture nonpathologic.      Impression: 1. No pulmonary embolism.  2. History of lung carcinoma with a single large lobular mass or  conglomeration of masses in the left hilum extending into the left  upper lobe invading the mediastinum. There is associated mediastinal  adenopathy. There is adenopathy in the left lower neck. There is  occlusion of the proximal left upper lobe mainstem bronchus. There is  narrowing of the proximal segmental arteries to the left upper lobe.  3. There is moderate elevation left hemidiaphragm.  4. Bibasilar atelectasis.  5. Endotracheal tube in satisfactory position      MACRO:  None      Signed by: Mili Arias 3/7/2024 4:10 PM  Dictation workstation:   [id number]  CT head wo IV contrast  Narrative: Interpreted By:  Tyree Llamas,   STUDY:  CT HEAD WO IV CONTRAST;  3/7/2024 3:44 pm      INDICATION:  Signs/Symptoms:seizure hx metastatic cancer. intubated.      COMPARISON:  None.      ACCESSION NUMBER(S):  [id number]      ORDERING CLINICIAN:  ANDRY CAMEJO      TECHNIQUE:  Noncontrast axial CT scan of head was performed. Angled reformats in  brain and bone windows were generated. The images were reviewed in  bone, brain, blood and soft tissue windows.      FINDINGS:  There is no intra/extra-axial fluid collection, mass effect, or  midline shift. The gray/white matter junction is preserved. The basal  cisterns are patent. The bilateral paranasal sinuses and mastoid air  cells are clear. The calvarium is intact.      Impression: No acute intracranial finding. MRI with and without IV contrast may  be obtained if clinical suspicion for intracranial metastasis is high.      Signed by: Tyree Llamas 3/7/2024 3:49 PM  Dictation workstation:   [id number]  XR chest 1 view  Narrative: Interpreted By:  Darek German,   STUDY:  XR CHEST 1 VIEW; 3/7/2024 3:08 pm      INDICATION:  CLINICAL INFORMATION: Signs/Symptoms:post tube.     "  COMPARISON:  03/04/2020      ACCESSION NUMBER(S):  [id number]      ORDERING CLINICIAN:  MELINDA MARTIN      TECHNIQUE:  Portable chest one view.      FINDINGS:  The cardiac size is indeterminate in view of the AP projection.  Left  hemidiaphragm is elevated, unchanged compared to the prior study.  Cutaneous defibrillator patches are present overlying the right and  left hemithorax. Endotracheal tube tip lies above the clavicles,  approximately 9 cm above the carlyle. Patchy atelectasis and  infiltrate is suspected within left perihilar and left basilar  distribution. Right hemithorax is clear. No alveolar consolidation is  identified.      Impression: 1. Status post intubation with the tip of the tube just above the  clavicles as above.  2. Elevation left hemidiaphragm with left perihilar and basilar  atelectasis and/or infiltrate.      MACRO:  none      Signed by: Darek German 3/7/2024 3:16 PM  Dictation workstation:   [id number]       Microbiology:   No results found for the last 90 days.    No results found for: \"URINECULTURE\", \"BLOODCULT\", \"CSFCULTSMEAR\"                 No lab exists for component: \"AGALPCRNB\"     Assessment and Plan:    Problem list:   Principal Problem:    Seizure (CMS/HCC)      Assessment and Plan:     Mike Franco is a 64 y.o. 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     Neurology:   #acute metabolic encephalopathy  #likely seizure, new-onset  -seizure precautions  -fall precautions  -s/p Keppra 2g loading dose in ED  -Keppra 500 mg IV BID  -SAT and SBT in the AM    Cardiovascular:   #A-fib with " RVR, new onset  - Tachycardic over 200 bpm in ED  -s/p cardioversion x3 unsuccessfully in ED  -started on amiodarone drip  - reverted to normal sinus rhythm    #hypotension  - s/p 1L LR in ED  - 1L NS bolus    #HLD  - home Crestor 5 mg daily    Respiratory:   #acute hypoxic respiratory failure 2/2 metabolic acidosis from acute seizure  - intubated in ED  - VBG in ED: pH 7.15  - on propofol and fentanyl  - ventilator care  - duonebs and albuterol    Gastrointestinal:  #bowel regimen  - Senokot 8.6 mg PO BID    Heme Onc:  #small cell lung CA with bone mets  #immunocompromised  - receiving active chemo/immuno therapy    #DVT prophylaxis  - lovenox 40 mg subcutaneous Q24H  - SCDs    Infectious Disease:   #CAP  #possible HAP  - pt had recent hospital admission around 01/11/24 for 3 days   -s/p zosyn in ED  - negative influenza A, B, COVID  - azithromycin 500mg IV Q24H  - ceftriaxzone 1 g IV Q24H  - vancomycin IV  -MRSA, procal, urine antigens, blood cultures pending    /Renal:   #elevated lactate  #metabolic acidosis  - lactate 15 in ED  -improving, down-trending  - monitor    Musculoskeletal:   #phantom pain for right AKA  - hold home roxicodone 5 mg PO Q6H PRN    #peripheral neuropathy  - hold gabapentin 100 mg PO nightly PRN    Endocrinology:   #hyperglycemia  - lispro SS #1    F: 1L NS, Replete as needed  E: Replete as needed  N: NPO  G: protonix  VTE: Lovenox, SCDs  Antibiotics: Vanc (3/7 - ), Ceftriaxone (3/7 - ), Azithromycin (3/7 - ); stopped zozyn (3/7)  Lines/Torrez: ET tube, OG tube, PIV x3 in UE, Torrez    Code:   Full Code    Disposition: 64 y.o.male admitted for acute hypoxic respiratory failure requiring intubation on ventilation, continue abx, likely seizure. The patient is at risk for immediate hemodynamic collapse necessitating continued ICU stay, Anticipate >48hours inpatient LOS.     Maricel Quinones DO

## 2024-03-08 ENCOUNTER — APPOINTMENT (OUTPATIENT)
Dept: CARDIOLOGY | Facility: HOSPITAL | Age: 64
DRG: 100 | End: 2024-03-08
Payer: COMMERCIAL

## 2024-03-08 LAB
ALBUMIN SERPL BCP-MCNC: 3.1 G/DL (ref 3.4–5)
ALP SERPL-CCNC: 41 U/L (ref 33–136)
ALT SERPL W P-5'-P-CCNC: 19 U/L (ref 10–52)
ANION GAP BLDA CALCULATED.4IONS-SCNC: 10 MMO/L (ref 10–25)
ANION GAP BLDV CALCULATED.4IONS-SCNC: 10 MMOL/L (ref 10–25)
ANION GAP SERPL CALC-SCNC: 12 MMOL/L (ref 10–20)
AST SERPL W P-5'-P-CCNC: 19 U/L (ref 9–39)
BASE EXCESS BLDA CALC-SCNC: -5 MMOL/L (ref -2–3)
BASE EXCESS BLDV CALC-SCNC: -1.2 MMOL/L (ref -2–3)
BASOPHILS # BLD AUTO: 0.13 X10*3/UL (ref 0–0.1)
BASOPHILS NFR BLD AUTO: 0.9 %
BILIRUB SERPL-MCNC: 0.3 MG/DL (ref 0–1.2)
BODY TEMPERATURE: ABNORMAL
BODY TEMPERATURE: ABNORMAL
BUN SERPL-MCNC: 20 MG/DL (ref 6–23)
CA-I BLDA-SCNC: 1.16 MMOL/L (ref 1.1–1.33)
CA-I BLDV-SCNC: 1.15 MMOL/L (ref 1.1–1.33)
CALCIUM SERPL-MCNC: 7.8 MG/DL (ref 8.6–10.3)
CARDIAC TROPONIN I PNL SERPL HS: 18 NG/L (ref 0–20)
CHLORIDE BLDA-SCNC: 107 MMOL/L (ref 98–107)
CHLORIDE BLDV-SCNC: 103 MMOL/L (ref 98–107)
CHLORIDE SERPL-SCNC: 105 MMOL/L (ref 98–107)
CK SERPL-CCNC: 121 U/L (ref 0–325)
CO2 SERPL-SCNC: 21 MMOL/L (ref 21–32)
CREAT SERPL-MCNC: 1.17 MG/DL (ref 0.5–1.3)
EGFRCR SERPLBLD CKD-EPI 2021: 70 ML/MIN/1.73M*2
EOSINOPHIL # BLD AUTO: 0.03 X10*3/UL (ref 0–0.7)
EOSINOPHIL NFR BLD AUTO: 0.2 %
ERYTHROCYTE [DISTWIDTH] IN BLOOD BY AUTOMATED COUNT: 15.4 % (ref 11.5–14.5)
GLUCOSE BLD MANUAL STRIP-MCNC: 109 MG/DL (ref 74–99)
GLUCOSE BLD MANUAL STRIP-MCNC: 111 MG/DL (ref 74–99)
GLUCOSE BLD MANUAL STRIP-MCNC: 83 MG/DL (ref 74–99)
GLUCOSE BLD MANUAL STRIP-MCNC: 83 MG/DL (ref 74–99)
GLUCOSE BLD MANUAL STRIP-MCNC: 92 MG/DL (ref 74–99)
GLUCOSE BLDA-MCNC: 103 MG/DL (ref 74–99)
GLUCOSE BLDV-MCNC: 79 MG/DL (ref 74–99)
GLUCOSE SERPL-MCNC: 86 MG/DL (ref 74–99)
HCO3 BLDA-SCNC: 19.8 MMOL/L (ref 22–26)
HCO3 BLDV-SCNC: 24.3 MMOL/L (ref 22–26)
HCT VFR BLD AUTO: 36.3 % (ref 41–52)
HCT VFR BLD EST: 35 % (ref 41–52)
HCT VFR BLD EST: 36 % (ref 41–52)
HGB BLD-MCNC: 11.8 G/DL (ref 13.5–17.5)
HGB BLDA-MCNC: 11.7 G/DL (ref 13.5–17.5)
HGB BLDV-MCNC: 12 G/DL (ref 13.5–17.5)
HOLD SPECIMEN: NORMAL
IMM GRANULOCYTES # BLD AUTO: 0.26 X10*3/UL (ref 0–0.7)
IMM GRANULOCYTES NFR BLD AUTO: 1.8 % (ref 0–0.9)
INHALED O2 CONCENTRATION: 40 %
INHALED O2 CONCENTRATION: 50 %
LACTATE BLDA-SCNC: 1.2 MMOL/L (ref 0.4–2)
LACTATE BLDV-SCNC: 1.1 MMOL/L (ref 0.4–2)
LEGIONELLA AG UR QL: NEGATIVE
LYMPHOCYTES # BLD AUTO: 2.95 X10*3/UL (ref 1.2–4.8)
LYMPHOCYTES NFR BLD AUTO: 20.2 %
MAGNESIUM SERPL-MCNC: 2.01 MG/DL (ref 1.6–2.4)
MCH RBC QN AUTO: 30.6 PG (ref 26–34)
MCHC RBC AUTO-ENTMCNC: 32.5 G/DL (ref 32–36)
MCV RBC AUTO: 94 FL (ref 80–100)
MONOCYTES # BLD AUTO: 1.47 X10*3/UL (ref 0.1–1)
MONOCYTES NFR BLD AUTO: 10.1 %
NEUTROPHILS # BLD AUTO: 9.73 X10*3/UL (ref 1.2–7.7)
NEUTROPHILS NFR BLD AUTO: 66.8 %
NRBC BLD-RTO: 0 /100 WBCS (ref 0–0)
OXYHGB MFR BLDA: 96.5 % (ref 94–98)
OXYHGB MFR BLDV: 46.1 % (ref 45–75)
PCO2 BLDA: 35 MM HG (ref 38–42)
PCO2 BLDV: 43 MM HG (ref 41–51)
PEEP CMH2O: 5 CM H2O
PH BLDA: 7.36 PH (ref 7.38–7.42)
PH BLDV: 7.36 PH (ref 7.33–7.43)
PHOSPHATE SERPL-MCNC: 3.7 MG/DL (ref 2.5–4.9)
PLATELET # BLD AUTO: 250 X10*3/UL (ref 150–450)
PO2 BLDA: 93 MM HG (ref 85–95)
PO2 BLDV: 32 MM HG (ref 35–45)
POTASSIUM BLDA-SCNC: 3.3 MMOL/L (ref 3.5–5.3)
POTASSIUM BLDV-SCNC: 3.3 MMOL/L (ref 3.5–5.3)
POTASSIUM SERPL-SCNC: 3.2 MMOL/L (ref 3.5–5.3)
PRESSURE SUPPORT: 5 CM H2O
PROCALCITONIN SERPL-MCNC: 0.13 NG/ML
PROT SERPL-MCNC: 5.3 G/DL (ref 6.4–8.2)
RBC # BLD AUTO: 3.85 X10*6/UL (ref 4.5–5.9)
S PNEUM AG UR QL: NEGATIVE
SAO2 % BLDA: 99 % (ref 94–100)
SAO2 % BLDV: 47 % (ref 45–75)
SODIUM BLDA-SCNC: 133 MMOL/L (ref 136–145)
SODIUM BLDV-SCNC: 134 MMOL/L (ref 136–145)
SODIUM SERPL-SCNC: 135 MMOL/L (ref 136–145)
TEST COMMENT: ABNORMAL
WBC # BLD AUTO: 14.6 X10*3/UL (ref 4.4–11.3)

## 2024-03-08 PROCEDURE — 84100 ASSAY OF PHOSPHORUS: CPT

## 2024-03-08 PROCEDURE — 87040 BLOOD CULTURE FOR BACTERIA: CPT | Mod: GEALAB

## 2024-03-08 PROCEDURE — 9420000001 HC RT PATIENT EDUCATION 5 MIN

## 2024-03-08 PROCEDURE — 87040 BLOOD CULTURE FOR BACTERIA: CPT | Mod: GEALAB | Performed by: STUDENT IN AN ORGANIZED HEALTH CARE EDUCATION/TRAINING PROGRAM

## 2024-03-08 PROCEDURE — 99291 CRITICAL CARE FIRST HOUR: CPT

## 2024-03-08 PROCEDURE — 36600 WITHDRAWAL OF ARTERIAL BLOOD: CPT

## 2024-03-08 PROCEDURE — 85025 COMPLETE CBC W/AUTO DIFF WBC: CPT

## 2024-03-08 PROCEDURE — 2500000004 HC RX 250 GENERAL PHARMACY W/ HCPCS (ALT 636 FOR OP/ED)

## 2024-03-08 PROCEDURE — 94667 MNPJ CHEST WALL 1ST: CPT

## 2024-03-08 PROCEDURE — 94681 O2 UPTK CO2 OUTP % O2 XTRC: CPT

## 2024-03-08 PROCEDURE — 84075 ASSAY ALKALINE PHOSPHATASE: CPT

## 2024-03-08 PROCEDURE — 93010 ELECTROCARDIOGRAM REPORT: CPT | Performed by: INTERNAL MEDICINE

## 2024-03-08 PROCEDURE — 94003 VENT MGMT INPAT SUBQ DAY: CPT

## 2024-03-08 PROCEDURE — 2500000001 HC RX 250 WO HCPCS SELF ADMINISTERED DRUGS (ALT 637 FOR MEDICARE OP)

## 2024-03-08 PROCEDURE — 99222 1ST HOSP IP/OBS MODERATE 55: CPT | Performed by: STUDENT IN AN ORGANIZED HEALTH CARE EDUCATION/TRAINING PROGRAM

## 2024-03-08 PROCEDURE — 84132 ASSAY OF SERUM POTASSIUM: CPT | Performed by: INTERNAL MEDICINE

## 2024-03-08 PROCEDURE — 94760 N-INVAS EAR/PLS OXIMETRY 1: CPT

## 2024-03-08 PROCEDURE — 2500000002 HC RX 250 W HCPCS SELF ADMINISTERED DRUGS (ALT 637 FOR MEDICARE OP, ALT 636 FOR OP/ED)

## 2024-03-08 PROCEDURE — 2020000001 HC ICU ROOM DAILY

## 2024-03-08 PROCEDURE — 2500000004 HC RX 250 GENERAL PHARMACY W/ HCPCS (ALT 636 FOR OP/ED): Performed by: STUDENT IN AN ORGANIZED HEALTH CARE EDUCATION/TRAINING PROGRAM

## 2024-03-08 PROCEDURE — 84484 ASSAY OF TROPONIN QUANT: CPT

## 2024-03-08 PROCEDURE — 36415 COLL VENOUS BLD VENIPUNCTURE: CPT

## 2024-03-08 PROCEDURE — 94640 AIRWAY INHALATION TREATMENT: CPT

## 2024-03-08 PROCEDURE — 83735 ASSAY OF MAGNESIUM: CPT

## 2024-03-08 PROCEDURE — 93005 ELECTROCARDIOGRAM TRACING: CPT

## 2024-03-08 PROCEDURE — 84145 PROCALCITONIN (PCT): CPT | Mod: GEALAB

## 2024-03-08 PROCEDURE — 36415 COLL VENOUS BLD VENIPUNCTURE: CPT | Performed by: STUDENT IN AN ORGANIZED HEALTH CARE EDUCATION/TRAINING PROGRAM

## 2024-03-08 PROCEDURE — C9113 INJ PANTOPRAZOLE SODIUM, VIA: HCPCS

## 2024-03-08 PROCEDURE — 84132 ASSAY OF SERUM POTASSIUM: CPT

## 2024-03-08 PROCEDURE — 82550 ASSAY OF CK (CPK): CPT

## 2024-03-08 PROCEDURE — 31720 CLEARANCE OF AIRWAYS: CPT

## 2024-03-08 PROCEDURE — 2500000005 HC RX 250 GENERAL PHARMACY W/O HCPCS: Performed by: INTERNAL MEDICINE

## 2024-03-08 PROCEDURE — 82947 ASSAY GLUCOSE BLOOD QUANT: CPT

## 2024-03-08 RX ORDER — OXYCODONE HYDROCHLORIDE 5 MG/1
5 TABLET ORAL EVERY 6 HOURS PRN
Status: DISCONTINUED | OUTPATIENT
Start: 2024-03-08 | End: 2024-03-14 | Stop reason: HOSPADM

## 2024-03-08 RX ORDER — ALBUTEROL SULFATE 0.83 MG/ML
2.5 SOLUTION RESPIRATORY (INHALATION) EVERY 2 HOUR PRN
Status: DISCONTINUED | OUTPATIENT
Start: 2024-03-08 | End: 2024-03-14 | Stop reason: HOSPADM

## 2024-03-08 RX ORDER — ALBUTEROL SULFATE 0.83 MG/ML
2.5 SOLUTION RESPIRATORY (INHALATION) EVERY 4 HOURS
Status: DISCONTINUED | OUTPATIENT
Start: 2024-03-08 | End: 2024-03-08

## 2024-03-08 RX ORDER — LORAZEPAM 0.5 MG/1
0.5 TABLET ORAL ONCE
Status: DISCONTINUED | OUTPATIENT
Start: 2024-03-08 | End: 2024-03-09

## 2024-03-08 RX ORDER — GABAPENTIN 100 MG/1
100 CAPSULE ORAL NIGHTLY PRN
Status: DISCONTINUED | OUTPATIENT
Start: 2024-03-08 | End: 2024-03-14 | Stop reason: HOSPADM

## 2024-03-08 RX ORDER — MULTIVIT-MIN/IRON FUM/FOLIC AC 7.5 MG-4
1 TABLET ORAL DAILY
Status: DISCONTINUED | OUTPATIENT
Start: 2024-03-08 | End: 2024-03-14 | Stop reason: HOSPADM

## 2024-03-08 RX ORDER — OXYCODONE HYDROCHLORIDE 5 MG/1
5 TABLET ORAL EVERY 6 HOURS PRN
Status: DISCONTINUED | OUTPATIENT
Start: 2024-03-08 | End: 2024-03-08

## 2024-03-08 RX ORDER — ROSUVASTATIN CALCIUM 10 MG/1
5 TABLET, COATED ORAL DAILY
Status: DISCONTINUED | OUTPATIENT
Start: 2024-03-08 | End: 2024-03-14 | Stop reason: HOSPADM

## 2024-03-08 RX ORDER — ALBUTEROL SULFATE 90 UG/1
2 AEROSOL, METERED RESPIRATORY (INHALATION) EVERY 6 HOURS PRN
Status: DISCONTINUED | OUTPATIENT
Start: 2024-03-08 | End: 2024-03-08

## 2024-03-08 RX ORDER — ALPRAZOLAM 0.25 MG/1
0.25 TABLET ORAL 3 TIMES DAILY PRN
Status: DISCONTINUED | OUTPATIENT
Start: 2024-03-08 | End: 2024-03-09

## 2024-03-08 RX ORDER — BENZONATATE 100 MG/1
100 CAPSULE ORAL 3 TIMES DAILY PRN
Status: DISCONTINUED | OUTPATIENT
Start: 2024-03-08 | End: 2024-03-14 | Stop reason: HOSPADM

## 2024-03-08 RX ADMIN — SENNOSIDES 8.6 MG: 8.6 TABLET, FILM COATED ORAL at 08:10

## 2024-03-08 RX ADMIN — ENOXAPARIN SODIUM 40 MG: 40 INJECTION SUBCUTANEOUS at 17:49

## 2024-03-08 RX ADMIN — Medication 5 L/MIN: at 09:55

## 2024-03-08 RX ADMIN — IPRATROPIUM BROMIDE AND ALBUTEROL SULFATE 3 ML: 2.5; .5 SOLUTION RESPIRATORY (INHALATION) at 12:03

## 2024-03-08 RX ADMIN — SODIUM CHLORIDE 1000 ML: 9 INJECTION, SOLUTION INTRAVENOUS at 07:43

## 2024-03-08 RX ADMIN — ROSUVASTATIN CALCIUM 5 MG: 10 TABLET, FILM COATED ORAL at 11:51

## 2024-03-08 RX ADMIN — Medication 1 TABLET: at 11:51

## 2024-03-08 RX ADMIN — PANTOPRAZOLE SODIUM 40 MG: 40 INJECTION, POWDER, FOR SOLUTION INTRAVENOUS at 06:00

## 2024-03-08 RX ADMIN — ALBUTEROL SULFATE 2.5 MG: 2.5 SOLUTION RESPIRATORY (INHALATION) at 22:27

## 2024-03-08 RX ADMIN — OXYCODONE HYDROCHLORIDE 5 MG: 5 TABLET ORAL at 22:47

## 2024-03-08 RX ADMIN — SENNOSIDES 8.6 MG: 8.6 TABLET, FILM COATED ORAL at 20:13

## 2024-03-08 RX ADMIN — GABAPENTIN 100 MG: 100 CAPSULE ORAL at 20:13

## 2024-03-08 RX ADMIN — IPRATROPIUM BROMIDE AND ALBUTEROL SULFATE 3 ML: 2.5; .5 SOLUTION RESPIRATORY (INHALATION) at 15:53

## 2024-03-08 RX ADMIN — Medication 50 MCG/HR: at 08:51

## 2024-03-08 RX ADMIN — IPRATROPIUM BROMIDE AND ALBUTEROL SULFATE 3 ML: 2.5; .5 SOLUTION RESPIRATORY (INHALATION) at 07:00

## 2024-03-08 RX ADMIN — AZITHROMYCIN MONOHYDRATE 500 MG: 500 INJECTION, POWDER, LYOPHILIZED, FOR SOLUTION INTRAVENOUS at 20:13

## 2024-03-08 RX ADMIN — BENZOCAINE AND MENTHOL 1 LOZENGE: 15; 3.6 LOZENGE ORAL at 22:46

## 2024-03-08 RX ADMIN — LEVETIRACETAM 500 MG: 5 INJECTION INTRAVENOUS at 08:09

## 2024-03-08 RX ADMIN — GUAIFENESIN, DEXTROMETHORPHAN HBR 1 TABLET: 600; 30 TABLET ORAL at 20:13

## 2024-03-08 RX ADMIN — PROPOFOL 20 MCG/KG/MIN: 10 INJECTION, EMULSION INTRAVENOUS at 05:32

## 2024-03-08 RX ADMIN — LEVETIRACETAM 500 MG: 5 INJECTION INTRAVENOUS at 21:00

## 2024-03-08 RX ADMIN — BENZOCAINE AND MENTHOL 1 LOZENGE: 15; 3.6 LOZENGE ORAL at 17:49

## 2024-03-08 RX ADMIN — IPRATROPIUM BROMIDE AND ALBUTEROL SULFATE 3 ML: 2.5; .5 SOLUTION RESPIRATORY (INHALATION) at 19:34

## 2024-03-08 ASSESSMENT — VISUAL ACUITY: VA_NORMAL: 1

## 2024-03-08 ASSESSMENT — PAIN SCALES - GENERAL
PAINLEVEL_OUTOF10: 6
PAINLEVEL_OUTOF10: 0 - NO PAIN

## 2024-03-08 ASSESSMENT — PAIN - FUNCTIONAL ASSESSMENT: PAIN_FUNCTIONAL_ASSESSMENT: 0-10

## 2024-03-08 ASSESSMENT — ACTIVITIES OF DAILY LIVING (ADL): LACK_OF_TRANSPORTATION: NO

## 2024-03-08 NOTE — PROGRESS NOTES
03/08/24 1415   Discharge Planning   Living Arrangements Spouse/significant other   Support Systems Spouse/significant other;Friends/neighbors;Family members   Assistance Needed Patient is A&O X3, on room air at baseline but is requiring O2 here, is independent with ADLs and uses a WC and cane for ambulation at home. Spoke with patient's significant other at bedside who states patient has most recently become weaker and having more trouble caring forhimself independently.   Type of Residence Private residence   Number of Stairs to Enter Residence 0  (ramp)   Number of Stairs Within Residence 0  (ranch)   Who is requesting discharge planning? Provider   Home or Post Acute Services None   Patient expects to be discharged to: Home no needs   Does the patient need discharge transport arranged? No   Financial Resource Strain   How hard is it for you to pay for the very basics like food, housing, medical care, and heating? Not hard   Housing Stability   In the last 12 months, was there a time when you were not able to pay the mortgage or rent on time? N   In the last 12 months, how many places have you lived? 1   In the last 12 months, was there a time when you did not have a steady place to sleep or slept in a shelter (including now)? N   Transportation Needs   In the past 12 months, has lack of transportation kept you from medical appointments or from getting medications? no   In the past 12 months, has lack of transportation kept you from meetings, work, or from getting things needed for daily living? No   Patient Choice   Provider Choice list and CMS website (https://medicare.gov/care-compare#search) for post-acute Quality and Resource Measure Data were provided and reviewed with: Family   Patient / Family choosing to utilize agency / facility established prior to hospitalization No

## 2024-03-08 NOTE — PROGRESS NOTES
03/08/24 1415   Discharge Planning   Living Arrangements Spouse/significant other   Support Systems Spouse/significant other;Friends/neighbors;Family members   Assistance Needed Patient is A&O X3, on room air at baseline but is requiring O2 here, is independent with ADLs and uses a WC and cane for ambulation at home. Spoke with patient's significant other at bedside who states patient has most recently become weaker and having more trouble caring for himself independently.   Type of Residence Private residence   Number of Stairs to Enter Residence 0  (ramp)   Number of Stairs Within Residence 0  (ranch)   Who is requesting discharge planning? Provider   Home or Post Acute Services None   Patient expects to be discharged to: Next site of care to be determined once PT/OT evaluations are completed.   Does the patient need discharge transport arranged? No   Financial Resource Strain   How hard is it for you to pay for the very basics like food, housing, medical care, and heating? Not hard   Housing Stability   In the last 12 months, was there a time when you were not able to pay the mortgage or rent on time? N   In the last 12 months, how many places have you lived? 1   In the last 12 months, was there a time when you did not have a steady place to sleep or slept in a shelter (including now)? N   Transportation Needs   In the past 12 months, has lack of transportation kept you from medical appointments or from getting medications? no   In the past 12 months, has lack of transportation kept you from meetings, work, or from getting things needed for daily living? No   Patient Choice   Provider Choice list and CMS website (https://medicare.gov/care-compare#search) for post-acute Quality and Resource Measure Data were provided and reviewed with: Family   Patient / Family choosing to utilize agency / facility established prior to hospitalization No

## 2024-03-08 NOTE — PROGRESS NOTES
Vancomycin Dosing by Pharmacy- Cessation of Therapy    Consult to pharmacy for vancomycin dosing has been discontinued by the prescriber, pharmacy will sign off at this time.    Please call pharmacy if there are further questions or re-enter a consult if vancomycin is resumed.     JIMBO STRATTON

## 2024-03-08 NOTE — PROGRESS NOTES
"Vancomycin Dosing by Pharmacy- INITIAL    Mike Franco is a 64 y.o. year old male who Pharmacy has been consulted for vancomycin dosing for pneumonia. Based on the patient's indication and renal status this patient will be dosed based on a goal AUC of 400-600.     Renal function is currently declining.    Visit Vitals  BP 89/64   Pulse 75   Temp 36.5 °C (97.7 °F) (Tympanic)   Resp (!) 28        Lab Results   Component Value Date    CREATININE 1.15 03/07/2024    CREATININE 0.84 03/04/2024    CREATININE 0.90 03/04/2024    CREATININE 0.76 02/28/2024        Patient weight is No results found for: \"PTWEIGHT\"    No results found for: \"CULTURE\"     I/O last 3 completed shifts:  In: 1202.4 (15.3 mL/kg) [I.V.:202.4 (2.6 mL/kg); IV Piggyback:1000]  Out: 850 (10.8 mL/kg) [Urine:850 (0.3 mL/kg/hr)]  Weight: 78.7 kg   [unfilled]    Lab Results   Component Value Date    PATIENTTEMP  03/07/2024      Comment:      NOTE: Patient Results are Not Corrected for Temperature    PATIENTTEMP  03/07/2024      Comment:      NOTE: Patient Results are Not Corrected for Temperature    PATIENTTEMP  03/07/2024      Comment:      NOTE: Patient Results are Not Corrected for Temperature          Assessment/Plan     Patient has already been given a loading dose of 2000 mg.  Will initiate vancomycin maintenance,  1500 mg every 24 hours.    Loading dose: 2000mg on 03/07/2024 @18:40  Regimen: 1500 mg IV every 24 hours.  Start time: 18:00 on 03/08/2024  Exposure target: AUC24 (range)400-600 mg/L.hr   AUC24,ss: 492 mg/L.hr  Probability of AUC24 > 400: 72 %  Ctrough,ss: 13.8 mg/L  Probability of Ctrough,ss > 20: 21 %  Probability of nephrotoxicity (Lodise RADHA 2009): 9 %      Follow-up level will be ordered on 03/09/2024 at 1st a.m. draw unless clinically indicated sooner.  Will continue to monitor renal function daily while on vancomycin and order serum creatinine at least every 48 hours if not already ordered.  Follow for continued vancomycin needs, " clinical response, and signs/symptoms of toxicity.       Hannah Kay, PharmD

## 2024-03-08 NOTE — CARE PLAN
The patient's goals for the shift include jessie    The clinical goals for the shift include Patient will remain hemodynamically stable throughout shift.

## 2024-03-08 NOTE — CARE PLAN
The patient's goals for the shift include jessie    The clinical goals for the shift include Patient will remain hemodynamically stable throughout shift.    Over the shift, the patient did not make progress toward the following goals. Barriers to progression include. Recommendations to address these barriers include.

## 2024-03-08 NOTE — PROGRESS NOTES
Intensive Care Unit - Daily Progress Note   Hospital Day: 2       Name:Mike Franco, AGE: 64 y.o., GENDER: male, MRN: [id number], ROOM: 06/06-A   CODE STATUS: Full Code  Attending Physician: Chantal Wahl MD  Admission Date: 3/7/2024        Subjective:    ICU length of stay: 18h     Overnight events: none    The patient was seen and examined at bedside. Pt was intubated on ventilator, with sedation. Pt tolerated SAT and SBT, awake, alert, following commands, met extubation criteria, and extubated. Pt feeling okay today, mild pain at back of throat from the ET tube, no other complaints at this time. Neurology to see patient. Denies headache, vision changes, chest pain, palpitations, abdominal pain, numbness, tingling.     Objective:    Vitals:   Vitals:    03/08/24 0900 03/08/24 0955 03/08/24 1000 03/08/24 1100   BP: 104/58  107/68 95/83   Pulse: 74  83 80   Resp: (!) 28  22 (!) 28   Temp:       TempSrc:       SpO2: 100% 93% 93% 90%   Weight: 78.7 kg (173 lb 8 oz)      Height:            Intake/Output Summary (Last 24 hours) at 3/8/2024 1132  Last data filed at 3/8/2024 0943  Gross per 24 hour   Intake 5504.73 ml   Output 1400 ml   Net 4104.73 ml        Physical Examination:   Physical Exam  Constitutional:       General: He is not in acute distress.     Appearance: He is not diaphoretic.   HENT:      Head: Normocephalic and atraumatic.   Eyes:      General: No scleral icterus.     Extraocular Movements: Extraocular movements intact.      Conjunctiva/sclera: Conjunctivae normal.      Pupils: Pupils are equal, round, and reactive to light.   Cardiovascular:      Rate and Rhythm: Normal rate and regular rhythm.      Pulses: Normal pulses.      Heart sounds: Normal heart sounds. No murmur heard.     No friction rub. No gallop.   Pulmonary:      Effort: Pulmonary effort is normal. No respiratory distress.      Breath sounds: Normal breath sounds. No stridor. No wheezing, rhonchi or rales.   Chest:      Chest wall: No  tenderness.   Abdominal:      General: Abdomen is flat. Bowel sounds are normal. There is no distension.      Palpations: Abdomen is soft. There is no mass.      Tenderness: There is no abdominal tenderness. There is no guarding or rebound.      Hernia: No hernia is present.   Musculoskeletal:         General: No swelling, tenderness or signs of injury. Normal range of motion.      Cervical back: Normal range of motion. No rigidity or tenderness.      Right lower leg: No edema.      Left lower leg: No edema.      Comments: Right AKA   Skin:     General: Skin is warm and dry.      Findings: No lesion or rash.   Neurological:      General: No focal deficit present.      Mental Status: He is alert. Mental status is at baseline.   Psychiatric:         Mood and Affect: Mood normal.         Behavior: Behavior normal.           Labs:   Results from last 7 days   Lab Units 03/08/24  0521 03/07/24  1508 03/04/24  1653   WBC AUTO x10*3/uL 14.6* 26.3* 8.9   HEMOGLOBIN g/dL 11.8* 14.5 13.4*   MCV fL 94 98 97   PLATELETS AUTO x10*3/uL 250 494* 403   HEMATOCRIT % 36.3* 45.6 40.8*     Results from last 7 days   Lab Units 03/08/24  0521 03/08/24  0405 03/07/24  1508 03/07/24  1443 03/04/24  1642   SODIUM mmol/L 135*  --  139  --  136   POTASSIUM mmol/L 3.2*  --  3.9  --  4.0   BUN mg/dL 20  --  22  --  19   CREATININE mg/dL 1.17  --  1.15  --  0.84   CHLORIDE mmol/L 105  --  95*  --  102   POCT HCO3 CALCULATED, VENOUS mmol/L  --  24.3 24.1 13.2* 33.1*   MAGNESIUM mg/dL 2.01  --  2.45*  --  1.80   PHOSPHORUS mg/dL 3.7  --  5.1*  --   --      Results from last 7 days   Lab Units 03/08/24  0521 03/04/24  1734 03/04/24  1642 03/04/24  1420   TROPHS ng/L 18  --  <3  --    BNP pg/mL  --   --  13  --    D-DIMER, QUANTITATIVE VTE EXCLUSION ng/mL FEU  --  881*  --   --    TSH mIU/L  --   --   --  2.35       Imaging:   XR chest 1 view  Narrative: Interpreted By:  Bhargav Malloy,   STUDY:  XR CHEST 1 VIEW;  3/7/2024 6:47 pm       INDICATION:  Signs/Symptoms:og tube in.      COMPARISON:  Radiographs of the chest dated 03/07/2024      ACCESSION NUMBER(S):  [id number]      ORDERING CLINICIAN:  RIKI DONALDSON      FINDINGS:  AP radiograph of the chest was provided.      There has been some advancement of the endotracheal tube in the  interim since prior exam, with the tip now overlying the carlyle by  1.1 cm. New enteric tube appears to course inferior to the diaphragm  with the tip overlying the left upper quadrant, in the expected  location of the gastric body.      CARDIOMEDIASTINAL SILHOUETTE:  Cardiomediastinal silhouette is normal in size and configuration.      LUNGS:  There is persistent asymmetric decreased size of the left lung with  atelectatic changes, trace effusion and mild pulmonary vascular  congestion. No new consolidation or pleural effusion is present in  the right lung.      ABDOMEN:  No remarkable upper abdominal findings.      BONES:  No acute osseous changes.      Impression: 1.  New enteric tube courses inferior to the diaphragm, with the tip  coiling in the left upper quadrant, in the expected location of the  gastric body. Some advancement of the endotracheal tube since prior  imaging, tip of which overlies the carlyle by 1.1 cm. Slight  retraction can be considered.  2. Asymmetric diminished volume of the left lung compared to the  contralateral right side, with persistent small left-sided pleural  effusion and associated atelectatic changes. No new consolidation or  pleural effusion is present in the right lung.              MACRO:  None      Signed by: Bhargav Malloy 3/7/2024 8:10 PM  Dictation workstation:   [id number]  CT angio chest for pulmonary embolism  Narrative: Interpreted By:  Mili Arias,   STUDY:  CT ANGIO CHEST FOR PULMONARY EMBOLISM;  3/7/2024 3:46 pm      INDICATION:  Signs/Symptoms:afib RVR hx lung cancer.      COMPARISON:  Chest radiograph 03/07/2024, chest CT 03/04/2024,  02/23/2024,  12/14/2023      ACCESSION NUMBER(S):  [id number]      ORDERING CLINICIAN:  ANDRY CAMEJO      TECHNIQUE:  Helical data acquisition of the chest was obtained  90 mL Omnipaque 350.  Images were reformatted in axial, coronal, and sagittal planes.  3D MIP images were generated and reviewed.      FINDINGS:  LUNGS and AIRWAYS:  Known history of primary lung carcinoma.  There is a conglomeration of masses or 1 large lobular mass in the  left hilum extending along the left upper paramediastinal region  invading the mediastinum. Combined, this measures 9.7 x 5.5 cm. This  was more masslike and lobular on 12/14/2023. On that study, this  measured 10.0 x 6.0 cm. There is occlusion of the proximal left upper  lobe bronchus seen previously on 03/04/2024.  there is narrowing of  the proximal segmental arteries to the left upper lobe There is  associated moderate elevation of the left hemidiaphragm. There is  peripheral right lower lobe atelectasis, new compared to 03/04/2024.  There is peripheral left lower lobe atelectasis, unchanged compared  to 03/24/2024. There is bandlike atelectasis in the lingula.  There are emphysematous changes of the lungs with biapical  thin-walled blebs and bullae. There are chronic interstitial changes  of the lungs.      VAL AND MEDIASTINUM:  There is mediastinal adenopathy in the prevascular space superior  mediastinum left hilum may aortopulmonary window and subcarinal  space. There are enlarged lymph nodes in the left lower neck. The  largest measures 1.5 x 1.1 cm There is an endotracheal tube with the  tip in the proximal trachea      HEART and VESSELS:  There is no thoracic aortic aneurysm or dissection.      There is good opacification of the pulmonary arterial system with no  embolism.      There is atherosclerotic calcification of the native coronary  arteries the study is not optimized for evaluation of coronary  arteries.      The cardiac chambers are not enlarged.       There is a small pericardial effusion, new.      UPPER ABDOMEN:  There are gastrohepatic ligament lymph nodes.  There is a 1.3 x 1.1 cm gastrohepatic ligament lymph node.  There is a 1.4 x 1.1 cm gastrohepatic ligament lymph node.  There is a 0.9 x 0.8 cm gastrohepatic ligament lymph node.  There is a 1.3 x 0.9 cm gastrohepatic ligament lymph node.  These have been seen on the prior studies.  The gallbladder is not completely included but there are gallstones.  There is partial visualization of a heavily calcified 1.8 cm  gallstone with central air.          CHEST WALL and OSSEOUS STRUCTURES:  There are no suspicious osseous lesions. Multilevel degenerative  changes are present There is a remote healed right lower lateral rib  fracture nonpathologic.      Impression: 1. No pulmonary embolism.  2. History of lung carcinoma with a single large lobular mass or  conglomeration of masses in the left hilum extending into the left  upper lobe invading the mediastinum. There is associated mediastinal  adenopathy. There is adenopathy in the left lower neck. There is  occlusion of the proximal left upper lobe mainstem bronchus. There is  narrowing of the proximal segmental arteries to the left upper lobe.  3. There is moderate elevation left hemidiaphragm.  4. Bibasilar atelectasis.  5. Endotracheal tube in satisfactory position      MACRO:  None      Signed by: Mili Arias 3/7/2024 4:10 PM  Dictation workstation:   [id number]  CT head wo IV contrast  Narrative: Interpreted By:  Tyree Llamas,   STUDY:  CT HEAD WO IV CONTRAST;  3/7/2024 3:44 pm      INDICATION:  Signs/Symptoms:seizure hx metastatic cancer. intubated.      COMPARISON:  None.      ACCESSION NUMBER(S):  [id number]      ORDERING CLINICIAN:  ANDRY CAMEJO      TECHNIQUE:  Noncontrast axial CT scan of head was performed. Angled reformats in  brain and bone windows were generated. The images were reviewed in  bone, brain, blood and soft tissue windows.     "  FINDINGS:  There is no intra/extra-axial fluid collection, mass effect, or  midline shift. The gray/white matter junction is preserved. The basal  cisterns are patent. The bilateral paranasal sinuses and mastoid air  cells are clear. The calvarium is intact.      Impression: No acute intracranial finding. MRI with and without IV contrast may  be obtained if clinical suspicion for intracranial metastasis is high.      Signed by: Tyree Llamas 3/7/2024 3:49 PM  Dictation workstation:   [id number]  XR chest 1 view  Narrative: Interpreted By:  Darek German,   STUDY:  XR CHEST 1 VIEW; 3/7/2024 3:08 pm      INDICATION:  CLINICAL INFORMATION: Signs/Symptoms:post tube.      COMPARISON:  03/04/2020      ACCESSION NUMBER(S):  [id number]      ORDERING CLINICIAN:  MELINDA MARTIN      TECHNIQUE:  Portable chest one view.      FINDINGS:  The cardiac size is indeterminate in view of the AP projection.  Left  hemidiaphragm is elevated, unchanged compared to the prior study.  Cutaneous defibrillator patches are present overlying the right and  left hemithorax. Endotracheal tube tip lies above the clavicles,  approximately 9 cm above the carlyle. Patchy atelectasis and  infiltrate is suspected within left perihilar and left basilar  distribution. Right hemithorax is clear. No alveolar consolidation is  identified.      Impression: 1. Status post intubation with the tip of the tube just above the  clavicles as above.  2. Elevation left hemidiaphragm with left perihilar and basilar  atelectasis and/or infiltrate.      MACRO:  none      Signed by: Darek German 3/7/2024 3:16 PM  Dictation workstation:   [id number]      Microbiology:   No results found for the last 90 days.                   No lab exists for component: \"AGALPCRNB\"   .ID  Lab Results   Component Value Date    BLOODCULT Loaded on Instrument - Culture in progress 03/08/2024    BLOODCULT Loaded on Instrument - Culture in progress 03/08/2024       Medications:  "   Scheduled Medications:   azithromycin, 500 mg, intravenous, q24h  [Held by provider] dextromethorphan-guaifenesin, 1 tablet, oral, q12h  enoxaparin, 40 mg, subcutaneous, q24h  insulin lispro, 0-5 Units, subcutaneous, q4h  ipratropium-albuteroL, 3 mL, nebulization, 4x daily  levETIRAcetam, 500 mg, intravenous, BID  multivitamin with minerals, 1 tablet, oral, Daily  pantoprazole, 40 mg, oral, Daily before breakfast   Or  pantoprazole, 40 mg, intravenous, Daily before breakfast  rosuvastatin, 5 mg, oral, Daily  sennosides, 1 tablet, oral, BID      Continuous Medications:   [Held by provider] amiodarone, 0.5-1 mg/min, Last Rate: Stopped (03/08/24 0743)      PRN Medications:   PRN medications: albuterol, [Held by provider] ALPRAZolam, dextrose 10 % in water (D10W), dextrose, gabapentin, glucagon, oxyCODONE, oxygen     Assessment and Plan:    Mike JUICE Franco is a 64 y.o. 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      Neurology:   #acute metabolic encephalopathy  #likely seizure, new-onset  -seizure precautions  -fall precautions  -s/p Keppra 2g loading dose in ED  -continue Keppra 500 mg IV BID  -Neurology consulted, appreciate recs  -MRI brain with and without IV contrast ordered  -EEG ordered     Cardiovascular:   #A-fib with RVR, new onset  - Tachycardic over 200 bpm in ED  -s/p cardioversion x3 unsuccessfully in ED  -s/p amiodarone drip in ED  - reverted to normal sinus rhythm  - stopped amiodarone drip,   - repeat EKG  -monitor     #hypotension  - s/p 1L LR in ED  - 1L NS bolus  - continue fluid assessment and replete as  needed     #HLD  - home Crestor 5 mg daily     Respiratory:   #inability to protect airway 2/2 seizure - resolved  - s/p intubated and placed on ventilator in ED  - VBG in ED: pH 7.15  - pt passed SAT and SBT, and met extubation criteria  - extubated today  - on 3L oxygen non-rebreather mask  - wean as tolerated  - continue home albuterol inhaler     Gastrointestinal:  #bowel regimen  - Senokot 8.6 mg PO BID     Heme Onc:  #small cell lung CA with bone mets  #immunocompromised  - receiving active chemo/immuno therapy     #DVT prophylaxis  - lovenox 40 mg subcutaneous Q24H  - SCDs     Infectious Disease:   #CAP  #possible HAP  - pt had recent hospital admission around 01/11/24 for 3 days   -s/p zosyn in ED   - azithromycin 500mg IV Q24H  - ceftriaxzone 1 g IV Q24H  - stop vancomycin IV  - negative MRSA, urine antigens, influenza A, B, COVID  -procal pending  -blood Cx and tracheal aspirate Cx pending     /Renal:   #elevated lactate  #metabolic acidosis  - lactate 15 in ED  -improving, down-trending  - monitor     Musculoskeletal:   #phantom pain for right AKA  - hold home roxicodone 5 mg PO Q6H PRN     #peripheral neuropathy  - home gabapentin 100 mg PO nightly PRN     Endocrinology:   #hyperglycemia  - lispro SS #1     F: 1L NS, Replete as needed  E: Replete as needed  N: regular  G: protonix  VTE: Lovenox, SCDs  Antibiotics:  Ceftriaxone (3/7 - ), Azithromycin (3/7 - ); stopped Vanc (3/7), stopped zosyn (3/7)  Lines/Torrez: PIV x3 in UE     Code:   Full Code     Disposition: 64 y.o.male admitted for intubation on ventilation to protect airway 2/2 seizure activity, continue abx for PNA, Neurology following. The patient is at risk for immediate hemodynamic collapse necessitating continued ICU stay, Anticipate <48hours inpatient LOS.    Maricel Quinones DO

## 2024-03-08 NOTE — CONSULTS
Inpatient consult to Neurology  Consult performed by: Kathy Kwok PA-C  Consult ordered by: Chantal Wahl MD        History Of Present Illness  Mike Franco is a 64 y.o. 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 Pt initially presented to [facility] ED for cough and SOB that had been going on for 1 month with concern for worsening lung CA. He was discharged home with doxy for CAP and instructed to f/u with PCP and oncology. On 3/7, pt then presented to ED with AMS and suspected seizure. His fiance states that she went to his bedroom to wake him up (reportedly had been resting in bed for the last 3 days not feeling well) and she found him lethargic and unarousable. States he was not talking to her. She called EMS and states that as they were taking him out of the house he began having full body shaking movements. Jack is unable to classify much further than that as far as what they looked like, what his eyes were doing, and whether or not he had any urinary incontinence. In the ED, he was post ictal and was intubated to protect airway. He also was cardioverted 2x for new onset a fib RVR and then started on amio drip. Pt was loaded with Keppra 2g in the ED and continued in 500 mg BID. He was extubated this morning. On exam this morning, he is close to his neurological baseline and denies any acute complaints.     Past Medical History  Past Medical History:   Diagnosis Date    Cough variant asthma 11/14/2017    Cough variant asthma    Hyperlipidemia     Lung cancer (CMS/HCC)     Personal history of other specified conditions 12/08/2022    History of  postnasal drip     Surgical History  Past Surgical History:   Procedure Laterality Date    HERNIA REPAIR      IR INTERVENTION FILTER PLACEMENT      LEG AMPUTATION Right      Social History  Social History     Tobacco Use    Smoking status: Former     Types: Cigarettes     Quit date:      Years since quittin.1    Smokeless tobacco: Never   Substance Use Topics    Alcohol use: Not Currently     Comment: social    Drug use: Never     Allergies  Etoposide and Fosaprepitant  Medications Prior to Admission   Medication Sig Dispense Refill Last Dose    albuterol 2.5 mg /3 mL (0.083 %) nebulizer solution Take 3 mL by nebulization every 6 hours if needed (For respiratory rate GREATER THAN OR EQUAL TO 28 breaths/minute and/or wheezing.) 75 mL 11     albuterol 90 mcg/actuation inhaler Inhale 2 puffs every 6 hours if needed for wheezing. 18 g 11     ALPRAZolam (Xanax) 0.25 mg tablet Take 1 tablet (0.25 mg) by mouth 3 times a day as needed for anxiety.       dextromethorphan-guaifenesin (Mucinex DM)  mg 12 hr tablet Take 1 tablet by mouth every 12 hours. Do not crush, chew, or split.       doxycycline (Adoxa) 100 mg tablet Take 1 tablet (100 mg) by mouth 2 times a day for 10 days. Take with a full glass of water and do not lie down for at least 30 minutes after 20 tablet 0     EPINEPHrine 0.3 mg/0.3 mL injection syringe Inject 0.3 mL (0.3 mg) into the muscle. As Directed       gabapentin (Neurontin) 100 mg capsule Take 1 capsule (100 mg) by mouth as needed at bedtime (phantom limb pain).       multivitamin tablet Take 1 tablet by mouth once daily.       OLANZapine (ZyPREXA) 5 mg tablet Take 1 tablet (5 mg) by mouth once daily at bedtime. For 4 days starting the evening of treatment 4 tablet 3     ondansetron (Zofran) 8 mg tablet Take 1 tablet (8 mg) by mouth every 8 hours if needed for nausea or vomiting. 30 tablet 5     oxyCODONE (Roxicodone) 5 mg immediate release tablet Take 1 tablet (5 mg) by mouth every 6  hours if needed for moderate pain (4 - 6). 30 tablet 0     pantoprazole (ProtoNix) 40 mg EC tablet Do not crush, chew, or split. Take it 30 min before breakfast daily 30 tablet 5     prochlorperazine (Compazine) 10 mg tablet Take 1 tablet (10 mg) by mouth every 6 hours if needed for nausea or vomiting. 30 tablet 5     rosuvastatin (Crestor) 5 mg tablet Take 1 tablet (5 mg) by mouth once daily.          Review of Systems  Neurological Exam  Mental Status  Awake, alert and oriented to person, place and time. Oriented to person, place, time and situation. Recent and remote memory are intact. Speech: Hypophonic s/p extubation but no ross dysarthria noted.. Language is fluent with no aphasia. Attention and concentration are normal. Fund of knowledge is appropriate for level of education.    Cranial Nerves  CN II: Visual acuity is normal. Visual fields full to confrontation.  CN III, IV, VI: Extraocular movements intact bilaterally. Normal lids and orbits bilaterally. Pupils equal round and reactive to light bilaterally.  CN V: Facial sensation is normal.  CN VII: Full and symmetric facial movement.  CN VIII: Hearing is normal.  CN IX, X: Palate elevates symmetrically. Normal gag reflex.  CN XI: Shoulder shrug strength is normal.  CN XII: Tongue midline without atrophy or fasciculations.    Motor  Normal muscle bulk throughout. No fasciculations present. Normal muscle tone. No abnormal involuntary movements.  General weakness, worst in the bilateral lower extremities  R leg above the knee amputatoin .    Sensory  Light touch is normal in upper and lower extremities. Pinprick is normal in upper and lower extremities. Temperature is normal in upper and lower extremities. Vibration is normal in upper and lower extremities. Proprioception is normal in upper and lower extremities.     Reflexes                                            Right                      Left  Brachioradialis                    2+                       "   2+  Biceps                                 2+                         2+  Patellar                                                        2+  Achilles                                2+                         2+    Coordination  Right: Finger-to-nose normal.Left: Finger-to-nose normal.    Gait    Deferred for pt safety.    Physical Exam  Eyes:      General: Lids are normal.      Extraocular Movements: Extraocular movements intact.      Pupils: Pupils are equal, round, and reactive to light.   Neurological:      Deep Tendon Reflexes:      Reflex Scores:       Bicep reflexes are 2+ on the right side and 2+ on the left side.       Brachioradialis reflexes are 2+ on the right side and 2+ on the left side.       Patellar reflexes are 2+ on the left side.       Achilles reflexes are 2+ on the right side and 2+ on the left side.      Last Recorded Vitals  Blood pressure 95/83, pulse 80, temperature 36.4 °C (97.5 °F), temperature source Temporal, resp. rate (!) 28, height 1.828 m (5' 11.97\"), weight 78.7 kg (173 lb 8 oz), SpO2 90 %.    Relevant Results  Scheduled medications  azithromycin, 500 mg, intravenous, q24h  [Held by provider] dextromethorphan-guaifenesin, 1 tablet, oral, q12h  enoxaparin, 40 mg, subcutaneous, q24h  insulin lispro, 0-5 Units, subcutaneous, q4h  ipratropium-albuteroL, 3 mL, nebulization, 4x daily  levETIRAcetam, 500 mg, intravenous, BID  multivitamin with minerals, 1 tablet, oral, Daily  pantoprazole, 40 mg, oral, Daily before breakfast   Or  pantoprazole, 40 mg, intravenous, Daily before breakfast  rosuvastatin, 5 mg, oral, Daily  sennosides, 1 tablet, oral, BID      Continuous medications  [Held by provider] amiodarone, 0.5-1 mg/min, Last Rate: Stopped (03/08/24 0743)      PRN medications  PRN medications: albuterol, [Held by provider] ALPRAZolam, dextrose 10 % in water (D10W), dextrose, gabapentin, glucagon, oxyCODONE, oxygen    Results for orders placed or performed during the hospital " encounter of 03/07/24 (from the past 24 hour(s))   BLOOD GAS VENOUS FULL PANEL   Result Value Ref Range    POCT pH, Venous 7.15 (LL) 7.33 - 7.43 pH    POCT pCO2, Venous 38 (L) 41 - 51 mm Hg    POCT pO2, Venous 47 (H) 35 - 45 mm Hg    POCT SO2, Venous 70 45 - 75 %    POCT Oxy Hemoglobin, Venous 67.4 45.0 - 75.0 %    POCT Hematocrit Calculated, Venous 44.0 41.0 - 52.0 %    POCT Sodium, Venous 132 (L) 136 - 145 mmol/L    POCT Potassium, Venous 4.8 3.5 - 5.3 mmol/L    POCT Chloride, Venous 101 98 - 107 mmol/L    POCT Ionized Calicum, Venous 1.18 1.10 - 1.33 mmol/L    POCT Glucose, Venous 200 (H) 74 - 99 mg/dL    POCT Lactate, Venous 14.4 (HH) 0.4 - 2.0 mmol/L    POCT Base Excess, Venous -14.9 (L) -2.0 - 3.0 mmol/L    POCT HCO3 Calculated, Venous 13.2 (L) 22.0 - 26.0 mmol/L    POCT Hemoglobin, Venous 14.6 13.5 - 17.5 g/dL    POCT Anion Gap, Venous 23.0 10.0 - 25.0 mmol/L    Patient Temperature      FiO2 21 %   POCT GLUCOSE   Result Value Ref Range    POCT Glucose 189 (H) 74 - 99 mg/dL   Blood Gas Lactic Acid, Venous   Result Value Ref Range    POCT Lactate, Venous 3.7 (H) 0.4 - 2.0 mmol/L   CBC and Auto Differential   Result Value Ref Range    WBC 26.3 (H) 4.4 - 11.3 x10*3/uL    nRBC 0.1 (H) 0.0 - 0.0 /100 WBCs    RBC 4.65 4.50 - 5.90 x10*6/uL    Hemoglobin 14.5 13.5 - 17.5 g/dL    Hematocrit 45.6 41.0 - 52.0 %    MCV 98 80 - 100 fL    MCH 31.2 26.0 - 34.0 pg    MCHC 31.8 (L) 32.0 - 36.0 g/dL    RDW 15.0 (H) 11.5 - 14.5 %    Platelets 494 (H) 150 - 450 x10*3/uL    Immature Granulocytes %, Automated 6.7 (H) 0.0 - 0.9 %    Immature Granulocytes Absolute, Automated 1.77 (H) 0.00 - 0.70 x10*3/uL   Comprehensive metabolic panel   Result Value Ref Range    Glucose 188 (H) 74 - 99 mg/dL    Sodium 139 136 - 145 mmol/L    Potassium 3.9 3.5 - 5.3 mmol/L    Chloride 95 (L) 98 - 107 mmol/L    Bicarbonate 14 (L) 21 - 32 mmol/L    Anion Gap 34 (H) 10 - 20 mmol/L    Urea Nitrogen 22 6 - 23 mg/dL    Creatinine 1.15 0.50 - 1.30 mg/dL     eGFR 72 >60 mL/min/1.73m*2    Calcium 9.5 8.6 - 10.3 mg/dL    Albumin 4.4 3.4 - 5.0 g/dL    Alkaline Phosphatase 68 33 - 136 U/L    Total Protein 7.6 6.4 - 8.2 g/dL    AST 25 9 - 39 U/L    Bilirubin, Total 0.5 0.0 - 1.2 mg/dL    ALT 25 10 - 52 U/L   Magnesium   Result Value Ref Range    Magnesium 2.45 (H) 1.60 - 2.40 mg/dL   Phosphorus   Result Value Ref Range    Phosphorus 5.1 (H) 2.5 - 4.9 mg/dL   Lactate   Result Value Ref Range    Lactate 15.6 (HH) 0.4 - 2.0 mmol/L   Protime-INR   Result Value Ref Range    Protime 14.1 (H) 9.8 - 12.8 seconds    INR 1.3 (H) 0.9 - 1.1   Blood Gas Venous Full Panel   Result Value Ref Range    POCT pH, Venous 7.25 (LL) 7.33 - 7.43 pH    POCT pCO2, Venous 55 (H) 41 - 51 mm Hg    POCT pO2, Venous 38 35 - 45 mm Hg    POCT SO2, Venous 57 45 - 75 %    POCT Oxy Hemoglobin, Venous 55.8 45.0 - 75.0 %    POCT Hematocrit Calculated, Venous 41.0 41.0 - 52.0 %    POCT Sodium, Venous 130 (L) 136 - 145 mmol/L    POCT Potassium, Venous 4.1 3.5 - 5.3 mmol/L    POCT Chloride, Venous 102 98 - 107 mmol/L    POCT Ionized Calicum, Venous 1.12 1.10 - 1.33 mmol/L    POCT Glucose, Venous 163 (H) 74 - 99 mg/dL    POCT Lactate, Venous 3.7 (H) 0.4 - 2.0 mmol/L    POCT Base Excess, Venous -3.9 (L) -2.0 - 3.0 mmol/L    POCT HCO3 Calculated, Venous 24.1 22.0 - 26.0 mmol/L    POCT Hemoglobin, Venous 13.5 13.5 - 17.5 g/dL    POCT Anion Gap, Venous 8.0 (L) 10.0 - 25.0 mmol/L    Patient Temperature      FiO2 60 %   Sars-CoV-2 and Influenza A/B PCR   Result Value Ref Range    Flu A Result Not Detected Not Detected    Flu B Result Not Detected Not Detected    Coronavirus 2019, PCR Not Detected Not Detected   Manual Differential   Result Value Ref Range    Neutrophils %, Manual 79.0 40.0 - 80.0 %    Bands %, Manual 3.0 0.0 - 5.0 %    Lymphocytes %, Manual 10.0 13.0 - 44.0 %    Monocytes %, Manual 3.0 2.0 - 10.0 %    Eosinophils %, Manual 0.0 0.0 - 6.0 %    Basophils %, Manual 0.0 0.0 - 2.0 %    Atypical Lymphocytes %,  Manual 1.0 0.0 - 2.0 %    Metamyelocytes %, Manual 2.0 0.0 - 0.0 %    Myelocytes %, Manual 2.0 0.0 - 0.0 %    Seg Neutrophils Absolute, Manual 20.78 (H) 1.20 - 7.00 x10*3/uL    Bands Absolute, Manual 0.79 (H) 0.00 - 0.70 x10*3/uL    Lymphocytes Absolute, Manual 2.63 1.20 - 4.80 x10*3/uL    Monocytes Absolute, Manual 0.79 0.10 - 1.00 x10*3/uL    Eosinophils Absolute, Manual 0.00 0.00 - 0.70 x10*3/uL    Basophils Absolute, Manual 0.00 0.00 - 0.10 x10*3/uL    Atypical Lymphs Absolute, Manual 0.26 0.00 - 0.50 x10*3/uL    Metamyelocytes Absolute, Manual 0.53 0.00 - 0.00 x10*3/uL    Myelocytes Absolute, Manual 0.53 0.00 - 0.00 x10*3/uL    Total Cells Counted 100     Neutrophils Absolute, Manual 21.57 (H) 1.20 - 7.70 x10*3/uL    RBC Morphology See Below     Polychromasia Mild     Clumped Platelets Present    Urinalysis with Reflex Culture and Microscopic   Result Value Ref Range    Color, Urine Yellow Straw, Yellow    Appearance, Urine Hazy (N) Clear    Specific Gravity, Urine 1.055 (N) 1.005 - 1.035    pH, Urine 6.0 5.0, 5.5, 6.0, 6.5, 7.0, 7.5, 8.0    Protein, Urine 30 (1+) (N) NEGATIVE mg/dL    Glucose, Urine NEGATIVE NEGATIVE mg/dL    Blood, Urine MODERATE (2+) (A) NEGATIVE    Ketones, Urine 20 (1+) (A) NEGATIVE mg/dL    Bilirubin, Urine NEGATIVE NEGATIVE    Urobilinogen, Urine <2.0 <2.0 mg/dL    Nitrite, Urine NEGATIVE NEGATIVE    Leukocyte Esterase, Urine NEGATIVE NEGATIVE   Extra Urine Gray Tube   Result Value Ref Range    Extra Tube Hold for add-ons.    Legionella Antigen, Urine    Specimen: Urine   Result Value Ref Range    L. pneumophila Urine Ag Negative Negative   Streptococcus pneumoniae Antigen, Urine    Specimen: Urine   Result Value Ref Range    Streptococcus pneumoniae Ag, Urine Negative Negative   Urinalysis Microscopic   Result Value Ref Range    WBC, Urine NONE 1-5, NONE /HPF    RBC, Urine >20 (A) NONE, 1-2, 3-5 /HPF    Squamous Epithelial Cells, Urine 1-9 (SPARSE) Reference range not established. /HPF     Bacteria, Urine 1+ (A) NONE SEEN /HPF   Blood Gas Arterial Full Panel   Result Value Ref Range    POCT pH, Arterial 7.42 7.38 - 7.42 pH    POCT pCO2, Arterial 35 (L) 38 - 42 mm Hg    POCT pO2, Arterial 264 (H) 85 - 95 mm Hg    POCT SO2, Arterial 100 94 - 100 %    POCT Oxy Hemoglobin, Arterial 98.4 (H) 94.0 - 98.0 %    POCT Hematocrit Calculated, Arterial 38.0 (L) 41.0 - 52.0 %    POCT Sodium, Arterial 132 (L) 136 - 145 mmol/L    POCT Potassium, Arterial 3.9 3.5 - 5.3 mmol/L    POCT Chloride, Arterial 102 98 - 107 mmol/L    POCT Ionized Calcium, Arterial 1.16 1.10 - 1.33 mmol/L    POCT Glucose, Arterial 146 (H) 74 - 99 mg/dL    POCT Lactate, Arterial 1.5 0.4 - 2.0 mmol/L    POCT Base Excess, Arterial -1.3 -2.0 - 3.0 mmol/L    POCT HCO3 Calculated, Arterial 22.7 22.0 - 26.0 mmol/L    POCT Hemoglobin, Arterial 12.8 (L) 13.5 - 17.5 g/dL    POCT Anion Gap, Arterial 11 10 - 25 mmo/L    Patient Temperature      FiO2 80 %    Test Comment 28/500/.80/+5    POCT GLUCOSE   Result Value Ref Range    POCT Glucose 165 (H) 74 - 99 mg/dL   MRSA Surveillance for Vancomycin De-escalation, PCR    Specimen: Anterior Nares; Swab   Result Value Ref Range    MRSA PCR Not Detected Not Detected   POCT GLUCOSE   Result Value Ref Range    POCT Glucose 109 (H) 74 - 99 mg/dL   Blood Culture    Specimen: Peripheral Venipuncture; Blood culture   Result Value Ref Range    Blood Culture Loaded on Instrument - Culture in progress    Blood Culture    Specimen: Peripheral Venipuncture; Blood culture   Result Value Ref Range    Blood Culture Loaded on Instrument - Culture in progress    Blood Gas Venous Full Panel   Result Value Ref Range    POCT pH, Venous 7.36 7.33 - 7.43 pH    POCT pCO2, Venous 43 41 - 51 mm Hg    POCT pO2, Venous 32 (L) 35 - 45 mm Hg    POCT SO2, Venous 47 45 - 75 %    POCT Oxy Hemoglobin, Venous 46.1 45.0 - 75.0 %    POCT Hematocrit Calculated, Venous 36.0 (L) 41.0 - 52.0 %    POCT Sodium, Venous 134 (L) 136 - 145 mmol/L    POCT  Potassium, Venous 3.3 (L) 3.5 - 5.3 mmol/L    POCT Chloride, Venous 103 98 - 107 mmol/L    POCT Ionized Calicum, Venous 1.15 1.10 - 1.33 mmol/L    POCT Glucose, Venous 79 74 - 99 mg/dL    POCT Lactate, Venous 1.1 0.4 - 2.0 mmol/L    POCT Base Excess, Venous -1.2 -2.0 - 3.0 mmol/L    POCT HCO3 Calculated, Venous 24.3 22.0 - 26.0 mmol/L    POCT Hemoglobin, Venous 12.0 (L) 13.5 - 17.5 g/dL    POCT Anion Gap, Venous 10.0 10.0 - 25.0 mmol/L    Patient Temperature      FiO2 50 %    Test Comment 28/500/.50/+5    POCT GLUCOSE   Result Value Ref Range    POCT Glucose 83 74 - 99 mg/dL   CBC and Auto Differential   Result Value Ref Range    WBC 14.6 (H) 4.4 - 11.3 x10*3/uL    nRBC 0.0 0.0 - 0.0 /100 WBCs    RBC 3.85 (L) 4.50 - 5.90 x10*6/uL    Hemoglobin 11.8 (L) 13.5 - 17.5 g/dL    Hematocrit 36.3 (L) 41.0 - 52.0 %    MCV 94 80 - 100 fL    MCH 30.6 26.0 - 34.0 pg    MCHC 32.5 32.0 - 36.0 g/dL    RDW 15.4 (H) 11.5 - 14.5 %    Platelets 250 150 - 450 x10*3/uL    Neutrophils % 66.8 40.0 - 80.0 %    Immature Granulocytes %, Automated 1.8 (H) 0.0 - 0.9 %    Lymphocytes % 20.2 13.0 - 44.0 %    Monocytes % 10.1 2.0 - 10.0 %    Eosinophils % 0.2 0.0 - 6.0 %    Basophils % 0.9 0.0 - 2.0 %    Neutrophils Absolute 9.73 (H) 1.20 - 7.70 x10*3/uL    Immature Granulocytes Absolute, Automated 0.26 0.00 - 0.70 x10*3/uL    Lymphocytes Absolute 2.95 1.20 - 4.80 x10*3/uL    Monocytes Absolute 1.47 (H) 0.10 - 1.00 x10*3/uL    Eosinophils Absolute 0.03 0.00 - 0.70 x10*3/uL    Basophils Absolute 0.13 (H) 0.00 - 0.10 x10*3/uL   Comprehensive Metabolic Panel   Result Value Ref Range    Glucose 86 74 - 99 mg/dL    Sodium 135 (L) 136 - 145 mmol/L    Potassium 3.2 (L) 3.5 - 5.3 mmol/L    Chloride 105 98 - 107 mmol/L    Bicarbonate 21 21 - 32 mmol/L    Anion Gap 12 10 - 20 mmol/L    Urea Nitrogen 20 6 - 23 mg/dL    Creatinine 1.17 0.50 - 1.30 mg/dL    eGFR 70 >60 mL/min/1.73m*2    Calcium 7.8 (L) 8.6 - 10.3 mg/dL    Albumin 3.1 (L) 3.4 - 5.0 g/dL     Alkaline Phosphatase 41 33 - 136 U/L    Total Protein 5.3 (L) 6.4 - 8.2 g/dL    AST 19 9 - 39 U/L    Bilirubin, Total 0.3 0.0 - 1.2 mg/dL    ALT 19 10 - 52 U/L   Magnesium   Result Value Ref Range    Magnesium 2.01 1.60 - 2.40 mg/dL   Phosphorus   Result Value Ref Range    Phosphorus 3.7 2.5 - 4.9 mg/dL   Creatine Kinase   Result Value Ref Range    Creatine Kinase 121 0 - 325 U/L   Troponin I, High Sensitivity   Result Value Ref Range    Troponin I, High Sensitivity 18 0 - 20 ng/L   Blood Gas Arterial Full Panel   Result Value Ref Range    POCT pH, Arterial 7.36 (L) 7.38 - 7.42 pH    POCT pCO2, Arterial 35 (L) 38 - 42 mm Hg    POCT pO2, Arterial 93 85 - 95 mm Hg    POCT SO2, Arterial 99 94 - 100 %    POCT Oxy Hemoglobin, Arterial 96.5 94.0 - 98.0 %    POCT Hematocrit Calculated, Arterial 35.0 (L) 41.0 - 52.0 %    POCT Sodium, Arterial 133 (L) 136 - 145 mmol/L    POCT Potassium, Arterial 3.3 (L) 3.5 - 5.3 mmol/L    POCT Chloride, Arterial 107 98 - 107 mmol/L    POCT Ionized Calcium, Arterial 1.16 1.10 - 1.33 mmol/L    POCT Glucose, Arterial 103 (H) 74 - 99 mg/dL    POCT Lactate, Arterial 1.2 0.4 - 2.0 mmol/L    POCT Base Excess, Arterial -5.0 (L) -2.0 - 3.0 mmol/L    POCT HCO3 Calculated, Arterial 19.8 (L) 22.0 - 26.0 mmol/L    POCT Hemoglobin, Arterial 11.7 (L) 13.5 - 17.5 g/dL    POCT Anion Gap, Arterial 10 10 - 25 mmo/L    Patient Temperature      FiO2 40 %    Peep CHM2O 5.0 cm H2O    Pressure Support 5 cm H2O   POCT GLUCOSE   Result Value Ref Range    POCT Glucose 111 (H) 74 - 99 mg/dL       XR chest 1 view   Final Result   1.  New enteric tube courses inferior to the diaphragm, with the tip   coiling in the left upper quadrant, in the expected location of the   gastric body. Some advancement of the endotracheal tube since prior   imaging, tip of which overlies the carlyle by 1.1 cm. Slight   retraction can be considered.   2. Asymmetric diminished volume of the left lung compared to the   contralateral right  side, with persistent small left-sided pleural   effusion and associated atelectatic changes. No new consolidation or   pleural effusion is present in the right lung.                  MACRO:   None        Signed by: Bhargav Malloy 3/7/2024 8:10 PM   Dictation workstation:   [id number]      CT angio chest for pulmonary embolism   Final Result   1. No pulmonary embolism.   2. History of lung carcinoma with a single large lobular mass or   conglomeration of masses in the left hilum extending into the left   upper lobe invading the mediastinum. There is associated mediastinal   adenopathy. There is adenopathy in the left lower neck. There is   occlusion of the proximal left upper lobe mainstem bronchus. There is   narrowing of the proximal segmental arteries to the left upper lobe.   3. There is moderate elevation left hemidiaphragm.   4. Bibasilar atelectasis.   5. Endotracheal tube in satisfactory position        MACRO:   None        Signed by: Mili Arias 3/7/2024 4:10 PM   Dictation workstation:   [id number]      CT head wo IV contrast   Final Result   No acute intracranial finding. MRI with and without IV contrast may   be obtained if clinical suspicion for intracranial metastasis is high.        Signed by: Tyree Llamas 3/7/2024 3:49 PM   Dictation workstation:   [id number]      XR chest 1 view   Final Result   1. Status post intubation with the tip of the tube just above the   clavicles as above.   2. Elevation left hemidiaphragm with left perihilar and basilar   atelectasis and/or infiltrate.        MACRO:   none        Signed by: Darek German 3/7/2024 3:16 PM   Dictation workstation:   [id number]      MR brain w and wo IV contrast    (Results Pending)         Ki Coma Scale  Best Eye Response: Spontaneous  Best Verbal Response: None (Intubated)  Best Motor Response: Follows commands  Ki Coma Scale Score: 11                Assessment/Plan   Principal Problem:    Seizure (CMS/HCC)    Pt  presented with suspected seizure like activity. There is not a great description of the event that took place, but based on wife's description and post ictal state in the ED with improvement towards baseline, elevated lactate (though no repeat available). Pt certainly has seizure risk factors that would justify erring on the side of caution and continuing Keppra.   - rEEG ordered  - MRI brain w and wo contrast was to be done next week as outpt, would recommend getting that while he is here  - Continue Keppra 500 mg BID   - Educated patient that they should not drive, operate heavy machinery, work around sharp objects or open flames, work on high surfaces, or swim or bath alone, or any other activity where they would be a risk to himself or others if a seizure were to occur for at least 6 months and until cleared by a neurologist.       Will follow up on MRI brain and EEG results.   Discussed case with neurology attending, Dr. Nevaeh Kwok, [facility]    I spent 60 minutes in the professional and overall care of this patient.    This is not a shared visit. I saw and evaluated the patient on morning rounds with the [facility] and nursing staff.  I agree with the above documentation   Jorge Alberto Herring MD

## 2024-03-08 NOTE — HOSPITAL COURSE
Mike Franco is a 64 y.o. 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   Pt presented to the ED 3/4 for cough and SOB that has lasted one month, and imaging concerning for worsening lung cancer with questionable areas of pneumonitis. Pt was stable and was discharged home with doxycycline for CAP with urgent outpt follow up with PCP and oncologist.   Pt presented to ED again 3/7 with altered mental status and seizure. Per family, pt has been feeling sick with fatigue and a cough, and feeling too weak to get out of bed. Wife went to bedroom and found the pt extremely lethargic and unarousable, and called EMS. Before the squad arrived to the home, the pt's entire body started shaking. He has never had seizures before.  ED course:    Pt with new-onset seizure and was postictal during his stay. He was intubated to protect his airway. He was also tachycardic and in new-onset a-fib with RVR with rate over 200, pt was cardioverted x3 at 360 J without success, and started on an amiodarone drip.  Glucose 188, Na 139, K 3.9, bicarb 14, lactate 15.6, INR 1.3, WBC 26.3, Hgb 14.5, platelet 494. UA with moderate blood, 1+ bacteria, no LE or nitrites.   Imaging: CXR: s/p intubation, left perihilar and basilar atelectasis or infiltrate. CTA chest: no PE; large lobular mass in left hilum extending in to upper lobe invading mediastinum, associated mediastinal adenopathy, and left lower neck adenopathy; Occlusion of proximal left upper lobe mainstem bronchus and narrowing of proximal segmental arteries to left upper lobe; bibasilar atelectasis. CT head: no acute intracranial finding.   Pt was given vancomycin and zosyn in the ED to cover for  CAP.   Last VBG had pH 7.25, pCO2 55, pO2 38, respiratory rate increased from 22 to 28 and pt was transferred to the ICU.      ICU Arrival: Pt arrived on ventilator with propofol, fentanyl, and on amiodarone drip. Pt was in normal sinus rhythm on arrival. OG tube placed, blood cultures sent from ICU. Hypotensive 89/64, given 1L NS bolus. Keppra loading dose and ordered maintenance dose.   3/8: Pt extubated, horne removed, able to pass urine. 3L O2 NC. Neurology consulted. EEG and MRI of brain with and without IV contrast ordered. Vancomycin stopped, MRSA negative. PT/OT consulted  3/9: Considering procalcitonin level, infectious pneumonia less likely, stopping ceftriaxone. Will complete three-day course of azithromycin in the setting of likely undiagnosed COPD.  Pt on 2L oxygen (baseline RA). 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 Plan for lumbar puncture for confirmatory testing. Per pt and family request, contacted their current oncologist, Dr. Sandhu, that he is currently in the hospital.     Floor Course: Patient medically stable and transferred to floor. Transiently treated with dexamethasone 2mg BID to assist in symptomatic relief. IR consulted for LP which showed high protein of 213 and low glucose of 15, consistent with bacterial. Patient's Oncologist called and spoke with his fiance, recommended considering hospice due to suspected brain mets and MRI results. Hospice consulted, patient discharged home with hospice on 3/14/24. Sent with scripts for Keppra, Oxycodone, Protonix, Melatonin, Duonebs, Senokot, and Midazolam PRN for seizure .

## 2024-03-09 ENCOUNTER — APPOINTMENT (OUTPATIENT)
Dept: RADIOLOGY | Facility: HOSPITAL | Age: 64
DRG: 100 | End: 2024-03-09
Payer: COMMERCIAL

## 2024-03-09 LAB
ALBUMIN SERPL BCP-MCNC: 3.2 G/DL (ref 3.4–5)
ALP SERPL-CCNC: 44 U/L (ref 33–136)
ALT SERPL W P-5'-P-CCNC: 18 U/L (ref 10–52)
ANION GAP SERPL CALC-SCNC: 11 MMOL/L (ref 10–20)
AST SERPL W P-5'-P-CCNC: 27 U/L (ref 9–39)
BILIRUB SERPL-MCNC: 0.6 MG/DL (ref 0–1.2)
BUN SERPL-MCNC: 13 MG/DL (ref 6–23)
CALCIUM SERPL-MCNC: 8.1 MG/DL (ref 8.6–10.3)
CHLORIDE SERPL-SCNC: 107 MMOL/L (ref 98–107)
CO2 SERPL-SCNC: 26 MMOL/L (ref 21–32)
CREAT SERPL-MCNC: 0.9 MG/DL (ref 0.5–1.3)
EGFRCR SERPLBLD CKD-EPI 2021: >90 ML/MIN/1.73M*2
ERYTHROCYTE [DISTWIDTH] IN BLOOD BY AUTOMATED COUNT: 15.6 % (ref 11.5–14.5)
GLUCOSE BLD MANUAL STRIP-MCNC: 122 MG/DL (ref 74–99)
GLUCOSE BLD MANUAL STRIP-MCNC: 82 MG/DL (ref 74–99)
GLUCOSE BLD MANUAL STRIP-MCNC: 95 MG/DL (ref 74–99)
GLUCOSE SERPL-MCNC: 86 MG/DL (ref 74–99)
HCT VFR BLD AUTO: 35.3 % (ref 41–52)
HGB BLD-MCNC: 11.6 G/DL (ref 13.5–17.5)
LACTATE SERPL-SCNC: 0.9 MMOL/L (ref 0.4–2)
MAGNESIUM SERPL-MCNC: 1.91 MG/DL (ref 1.6–2.4)
MCH RBC QN AUTO: 31.5 PG (ref 26–34)
MCHC RBC AUTO-ENTMCNC: 32.9 G/DL (ref 32–36)
MCV RBC AUTO: 96 FL (ref 80–100)
NRBC BLD-RTO: 0 /100 WBCS (ref 0–0)
PLATELET # BLD AUTO: 211 X10*3/UL (ref 150–450)
POTASSIUM SERPL-SCNC: 2.9 MMOL/L (ref 3.5–5.3)
PROT SERPL-MCNC: 5.6 G/DL (ref 6.4–8.2)
RBC # BLD AUTO: 3.68 X10*6/UL (ref 4.5–5.9)
SODIUM SERPL-SCNC: 141 MMOL/L (ref 136–145)
STAPHYLOCOCCUS SPEC CULT: NORMAL
WBC # BLD AUTO: 12.2 X10*3/UL (ref 4.4–11.3)

## 2024-03-09 PROCEDURE — 2500000005 HC RX 250 GENERAL PHARMACY W/O HCPCS: Performed by: INTERNAL MEDICINE

## 2024-03-09 PROCEDURE — 70553 MRI BRAIN STEM W/O & W/DYE: CPT

## 2024-03-09 PROCEDURE — 2500000002 HC RX 250 W HCPCS SELF ADMINISTERED DRUGS (ALT 637 FOR MEDICARE OP, ALT 636 FOR OP/ED)

## 2024-03-09 PROCEDURE — 2500000004 HC RX 250 GENERAL PHARMACY W/ HCPCS (ALT 636 FOR OP/ED)

## 2024-03-09 PROCEDURE — 99233 SBSQ HOSP IP/OBS HIGH 50: CPT

## 2024-03-09 PROCEDURE — 70553 MRI BRAIN STEM W/O & W/DYE: CPT | Performed by: RADIOLOGY

## 2024-03-09 PROCEDURE — 2500000001 HC RX 250 WO HCPCS SELF ADMINISTERED DRUGS (ALT 637 FOR MEDICARE OP)

## 2024-03-09 PROCEDURE — 84145 PROCALCITONIN (PCT): CPT | Mod: GEALAB

## 2024-03-09 PROCEDURE — 83735 ASSAY OF MAGNESIUM: CPT

## 2024-03-09 PROCEDURE — 94640 AIRWAY INHALATION TREATMENT: CPT

## 2024-03-09 PROCEDURE — 82947 ASSAY GLUCOSE BLOOD QUANT: CPT

## 2024-03-09 PROCEDURE — 85027 COMPLETE CBC AUTOMATED: CPT

## 2024-03-09 PROCEDURE — A9575 INJ GADOTERATE MEGLUMI 0.1ML: HCPCS | Performed by: INTERNAL MEDICINE

## 2024-03-09 PROCEDURE — 80053 COMPREHEN METABOLIC PANEL: CPT

## 2024-03-09 PROCEDURE — 36415 COLL VENOUS BLD VENIPUNCTURE: CPT

## 2024-03-09 PROCEDURE — 2550000001 HC RX 255 CONTRASTS: Performed by: INTERNAL MEDICINE

## 2024-03-09 PROCEDURE — 83605 ASSAY OF LACTIC ACID: CPT

## 2024-03-09 PROCEDURE — 94760 N-INVAS EAR/PLS OXIMETRY 1: CPT

## 2024-03-09 PROCEDURE — 94668 MNPJ CHEST WALL SBSQ: CPT

## 2024-03-09 PROCEDURE — 1200000002 HC GENERAL ROOM WITH TELEMETRY DAILY

## 2024-03-09 RX ORDER — LANOLIN ALCOHOL/MO/W.PET/CERES
400 CREAM (GRAM) TOPICAL ONCE
Status: COMPLETED | OUTPATIENT
Start: 2024-03-09 | End: 2024-03-09

## 2024-03-09 RX ORDER — ACETAMINOPHEN 500 MG
5 TABLET ORAL NIGHTLY
Status: DISCONTINUED | OUTPATIENT
Start: 2024-03-09 | End: 2024-03-14 | Stop reason: HOSPADM

## 2024-03-09 RX ORDER — INSULIN LISPRO 100 [IU]/ML
0-5 INJECTION, SOLUTION INTRAVENOUS; SUBCUTANEOUS
Status: DISCONTINUED | OUTPATIENT
Start: 2024-03-09 | End: 2024-03-14 | Stop reason: HOSPADM

## 2024-03-09 RX ORDER — GADOTERATE MEGLUMINE 376.9 MG/ML
16 INJECTION INTRAVENOUS
Status: COMPLETED | OUTPATIENT
Start: 2024-03-09 | End: 2024-03-09

## 2024-03-09 RX ORDER — POTASSIUM CHLORIDE 1.5 G/1.58G
40 POWDER, FOR SOLUTION ORAL
Status: COMPLETED | OUTPATIENT
Start: 2024-03-09 | End: 2024-03-09

## 2024-03-09 RX ORDER — LORAZEPAM 0.5 MG/1
0.5 TABLET ORAL ONCE
Status: COMPLETED | OUTPATIENT
Start: 2024-03-09 | End: 2024-03-09

## 2024-03-09 RX ADMIN — LORAZEPAM 0.5 MG: 0.5 TABLET ORAL at 09:59

## 2024-03-09 RX ADMIN — PANTOPRAZOLE SODIUM 40 MG: 40 TABLET, DELAYED RELEASE ORAL at 08:17

## 2024-03-09 RX ADMIN — Medication 1 TABLET: at 08:17

## 2024-03-09 RX ADMIN — GUAIFENESIN, DEXTROMETHORPHAN HBR 1 TABLET: 600; 30 TABLET ORAL at 08:17

## 2024-03-09 RX ADMIN — SENNOSIDES 8.6 MG: 8.6 TABLET, FILM COATED ORAL at 20:14

## 2024-03-09 RX ADMIN — SENNOSIDES 8.6 MG: 8.6 TABLET, FILM COATED ORAL at 08:17

## 2024-03-09 RX ADMIN — POTASSIUM CHLORIDE 40 MEQ: 1.5 POWDER, FOR SOLUTION ORAL at 08:18

## 2024-03-09 RX ADMIN — IPRATROPIUM BROMIDE AND ALBUTEROL SULFATE 3 ML: 2.5; .5 SOLUTION RESPIRATORY (INHALATION) at 19:37

## 2024-03-09 RX ADMIN — IPRATROPIUM BROMIDE AND ALBUTEROL SULFATE 3 ML: 2.5; .5 SOLUTION RESPIRATORY (INHALATION) at 13:50

## 2024-03-09 RX ADMIN — PANTOPRAZOLE SODIUM 40 MG: 40 TABLET, DELAYED RELEASE ORAL at 06:00

## 2024-03-09 RX ADMIN — Medication 5 MG: at 20:14

## 2024-03-09 RX ADMIN — GADOTERATE MEGLUMINE 16 ML: 376.9 INJECTION INTRAVENOUS at 11:22

## 2024-03-09 RX ADMIN — ENOXAPARIN SODIUM 40 MG: 40 INJECTION SUBCUTANEOUS at 17:06

## 2024-03-09 RX ADMIN — Medication 400 MG: at 08:17

## 2024-03-09 RX ADMIN — ROSUVASTATIN CALCIUM 5 MG: 10 TABLET, FILM COATED ORAL at 08:17

## 2024-03-09 RX ADMIN — IPRATROPIUM BROMIDE AND ALBUTEROL SULFATE 3 ML: 2.5; .5 SOLUTION RESPIRATORY (INHALATION) at 15:36

## 2024-03-09 RX ADMIN — POTASSIUM CHLORIDE 40 MEQ: 1.5 POWDER, FOR SOLUTION ORAL at 09:59

## 2024-03-09 RX ADMIN — Medication 2 L/MIN: at 13:50

## 2024-03-09 RX ADMIN — Medication 2 L/MIN: at 07:03

## 2024-03-09 RX ADMIN — GUAIFENESIN, DEXTROMETHORPHAN HBR 1 TABLET: 600; 30 TABLET ORAL at 20:14

## 2024-03-09 RX ADMIN — LEVETIRACETAM 500 MG: 5 INJECTION INTRAVENOUS at 08:20

## 2024-03-09 RX ADMIN — LEVETIRACETAM 500 MG: 5 INJECTION INTRAVENOUS at 20:28

## 2024-03-09 RX ADMIN — AZITHROMYCIN MONOHYDRATE 500 MG: 500 INJECTION, POWDER, LYOPHILIZED, FOR SOLUTION INTRAVENOUS at 20:14

## 2024-03-09 RX ADMIN — IPRATROPIUM BROMIDE AND ALBUTEROL SULFATE 3 ML: 2.5; .5 SOLUTION RESPIRATORY (INHALATION) at 07:03

## 2024-03-09 ASSESSMENT — PAIN - FUNCTIONAL ASSESSMENT
PAIN_FUNCTIONAL_ASSESSMENT: 0-10

## 2024-03-09 ASSESSMENT — PAIN SCALES - GENERAL
PAINLEVEL_OUTOF10: 0 - NO PAIN

## 2024-03-09 NOTE — PROGRESS NOTES
Intensive Care Unit - Daily Progress Note   Hospital Day: 3       Name:Mike Franco, AGE: 64 y.o., GENDER: male, MRN: [id number], ROOM: 06/06-A   CODE STATUS: Full Code  Attending Physician: Chantal Wahl MD  Admission Date: 3/7/2024        Subjective:    ICU length of stay: 1d 17h     Overnight events: none    The patient was seen and examined at bedside. Pt feeling better today, has some intermittent SOB on 2L oxygen NC while coughing. Pt was unable to tolerate lying flat for MRI yesterday, but he is agreeable to trying again today. No return of seizure-like symptoms.  Denies chest pain, abdominal pain, fever, chills, headache, numbness, tingling, diarrhea, constipation, or urinary symptoms.     Objective:    Vitals:   Vitals:    03/09/24 0800 03/09/24 0854 03/09/24 0900 03/09/24 1000   BP: 115/51  133/82 126/74   BP Location: Right arm      Patient Position: Lying      Pulse: 86  98 93   Resp: 24  23 (!) 27   Temp: 36.8 °C (98.2 °F)      TempSrc: Temporal      SpO2: 96%  91% 96%   Weight:  81.6 kg (179 lb 14.3 oz)     Height:            Intake/Output Summary (Last 24 hours) at 3/9/2024 1054  Last data filed at 3/9/2024 1000  Gross per 24 hour   Intake 910 ml   Output 1450 ml   Net -540 ml          Physical Examination:   Physical Exam  Constitutional:       General: He is not in acute distress.     Appearance: He is not diaphoretic.   HENT:      Head: Normocephalic and atraumatic.   Eyes:      General: No scleral icterus.     Extraocular Movements: Extraocular movements intact.      Conjunctiva/sclera: Conjunctivae normal.      Pupils: Pupils are equal, round, and reactive to light.   Cardiovascular:      Rate and Rhythm: Normal rate and regular rhythm.      Pulses: Normal pulses.      Heart sounds: Normal heart sounds. No murmur heard.     No friction rub. No gallop.   Pulmonary:      Effort: Pulmonary effort is normal. No respiratory distress.      Breath sounds: Normal breath sounds. No stridor. No wheezing,  rhonchi or rales.      Comments: 2L NC  Chest:      Chest wall: No tenderness.   Abdominal:      General: Abdomen is flat. Bowel sounds are normal. There is no distension.      Palpations: Abdomen is soft. There is no mass.      Tenderness: There is no abdominal tenderness. There is no guarding or rebound.      Hernia: No hernia is present.   Musculoskeletal:         General: No swelling, tenderness or signs of injury. Normal range of motion.      Cervical back: Normal range of motion. No rigidity or tenderness.      Right lower leg: No edema.      Left lower leg: No edema.      Comments: Right AKA   Skin:     General: Skin is warm and dry.      Findings: No lesion or rash.   Neurological:      General: No focal deficit present.      Mental Status: He is alert. Mental status is at baseline.   Psychiatric:         Mood and Affect: Mood normal.         Behavior: Behavior normal.           Labs:   Results from last 7 days   Lab Units 03/09/24  0555 03/08/24  0521 03/07/24  1508   WBC AUTO x10*3/uL 12.2* 14.6* 26.3*   HEMOGLOBIN g/dL 11.6* 11.8* 14.5   MCV fL 96 94 98   PLATELETS AUTO x10*3/uL 211 250 494*   HEMATOCRIT % 35.3* 36.3* 45.6       Results from last 7 days   Lab Units 03/09/24  0555 03/08/24  0521 03/08/24  0405 03/07/24  1508 03/07/24  1443   SODIUM mmol/L 141 135*  --  139  --    POTASSIUM mmol/L 2.9* 3.2*  --  3.9  --    BUN mg/dL 13 20  --  22  --    CREATININE mg/dL 0.90 1.17  --  1.15  --    CHLORIDE mmol/L 107 105  --  95*  --    POCT HCO3 CALCULATED, VENOUS mmol/L  --   --  24.3 24.1 13.2*   MAGNESIUM mg/dL 1.91 2.01  --  2.45*  --    PHOSPHORUS mg/dL  --  3.7  --  5.1*  --        Results from last 7 days   Lab Units 03/08/24  0521 03/04/24  1734 03/04/24  1642 03/04/24  1420   TROPHS ng/L 18  --  <3  --    BNP pg/mL  --   --  13  --    D-DIMER, QUANTITATIVE VTE EXCLUSION ng/mL FEU  --  881*  --   --    TSH mIU/L  --   --   --  2.35         Imaging:   ECG 12 lead  Normal sinus rhythm  Low voltage  "QRS  Cannot rule out Anterior infarct (cited on or before 07-MAR-2024)  Abnormal ECG  When compared with ECG of 07-MAR-2024 14:41, (unconfirmed)  Sinus rhythm has replaced Atrial fibrillation  Vent. rate has decreased  BPM  Serial changes of evolving Anterior infarct Present      Microbiology:   No results found for the last 90 days.                   No lab exists for component: \"AGALPCRNB\"   .ID  Lab Results   Component Value Date    BLOODCULT No growth at 1 day 03/08/2024    BLOODCULT No growth at 1 day 03/08/2024       Medications:    Scheduled Medications:   azithromycin, 500 mg, intravenous, q24h  dextromethorphan-guaifenesin, 1 tablet, oral, q12h  enoxaparin, 40 mg, subcutaneous, q24h  insulin lispro, 0-5 Units, subcutaneous, TID with meals  ipratropium-albuteroL, 3 mL, nebulization, 4x daily  levETIRAcetam, 500 mg, intravenous, BID  melatonin, 5 mg, oral, Nightly  multivitamin with minerals, 1 tablet, oral, Daily  pantoprazole, 40 mg, oral, Daily before breakfast  rosuvastatin, 5 mg, oral, Daily  sennosides, 1 tablet, oral, BID      Continuous Medications:        PRN Medications:   PRN medications: albuterol, benzocaine-menthol, benzonatate, dextrose 10 % in water (D10W), dextrose, gabapentin, glucagon, oxyCODONE, oxygen     Assessment and Plan:    Mike Franco is a 64 y.o. 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      Neurology:   #acute metabolic encephalopathy  #likely seizure, new-onset  -seizure precautions  -fall precautions  -s/p Keppra 2g loading dose in ED  -continue Keppra 500 mg IV BID  -Neurology " consulted, appreciate recs  -MRI brain with and without IV contrast ordered  -EEG ordered     Cardiovascular:   #A-fib with RVR, new onset - resolved  - Tachycardic over 200 bpm in ED  -s/p cardioversion x3 unsuccessfully in ED  -s/p amiodarone drip in ED  - reverted to normal sinus rhythm  - stopped amiodarone drip,   -monitor     #hypotension  - continue fluid assessment and replete as needed     #HLD  - home Crestor 5 mg daily     Respiratory:   #inability to protect airway 2/2 seizure - resolved  - s/p intubated and placed on ventilator in ED  - VBG in ED: pH 7.15  - extubated 3/8  - on 2L oxygen NC  - wean as tolerated  - continue home albuterol inhaler     Gastrointestinal:  #bowel regimen  - Senokot 8.6 mg PO BID     Heme Onc:  #small cell lung CA with bone mets  #immunocompromised  - receiving active chemo/immuno therapy     #DVT prophylaxis  - lovenox 40 mg subcutaneous Q24H  - SCDs     Infectious Disease:   #CAP  #possible HAP  - pt had recent hospital admission around 01/11/24 for 3 days   -s/p zosyn in ED   -negative MRSA, influenza, COVID  -stopped vanc IV  - Procal 0.13, infectious pna less likely  - stop ceftriaxzone 1 g IV Q24H  - finish azithromycin 500mg IV Q24H x 3 days (3/7 - 3/9)  -blood Cx: NGTD x1 day  - follow tracheal aspirate Cx: no PMNs, moderate gram positive and negative bacteria     /Renal:   #elevated lactate  #metabolic acidosis  - lactate 15 in ED  -improving, down-trending  - monitor     Musculoskeletal:   #phantom pain for right AKA  - hold home roxicodone 5 mg PO Q6H PRN     #peripheral neuropathy  - home gabapentin 100 mg PO nightly PRN    #weakness  - PT/OT consulted     Endocrinology:   #hyperglycemia  - lispro SS #1     F: Replete as needed  E: Replete as needed  N: regular  G: protonix  VTE: Lovenox, SCDs  Antibiotics: completed Azithromycin (3/7 -3/9);   stopped Vanc (3/7), stopped Ceftriaxone (3/7), stopped zosyn (3/7)  Lines/Torrez: PIV x3 in UE     Code:   Full Code      Disposition: 64 y.o.male admitted for intubation on ventilation to protect airway 2/2 seizure activity.  Neurology following. The pt is stable and ready for transfer to medical floor with telemetry monitoring.    Maricel Quinones DO

## 2024-03-10 LAB
ALBUMIN SERPL BCP-MCNC: 3.1 G/DL (ref 3.4–5)
ALP SERPL-CCNC: 44 U/L (ref 33–136)
ALT SERPL W P-5'-P-CCNC: 15 U/L (ref 10–52)
ANION GAP SERPL CALC-SCNC: 11 MMOL/L (ref 10–20)
AST SERPL W P-5'-P-CCNC: 20 U/L (ref 9–39)
ATRIAL RATE: 79 BPM
BACTERIA SPEC RESP CULT: ABNORMAL
BILIRUB SERPL-MCNC: 0.7 MG/DL (ref 0–1.2)
BUN SERPL-MCNC: 12 MG/DL (ref 6–23)
CALCIUM SERPL-MCNC: 8.2 MG/DL (ref 8.6–10.3)
CHLORIDE SERPL-SCNC: 103 MMOL/L (ref 98–107)
CO2 SERPL-SCNC: 26 MMOL/L (ref 21–32)
CREAT SERPL-MCNC: 0.68 MG/DL (ref 0.5–1.3)
EGFRCR SERPLBLD CKD-EPI 2021: >90 ML/MIN/1.73M*2
ERYTHROCYTE [DISTWIDTH] IN BLOOD BY AUTOMATED COUNT: 15.5 % (ref 11.5–14.5)
GLUCOSE BLD MANUAL STRIP-MCNC: 103 MG/DL (ref 74–99)
GLUCOSE BLD MANUAL STRIP-MCNC: 97 MG/DL (ref 74–99)
GLUCOSE BLD MANUAL STRIP-MCNC: 99 MG/DL (ref 74–99)
GLUCOSE SERPL-MCNC: 101 MG/DL (ref 74–99)
GRAM STN SPEC: ABNORMAL
GRAM STN SPEC: ABNORMAL
HCT VFR BLD AUTO: 35.5 % (ref 41–52)
HGB BLD-MCNC: 11.7 G/DL (ref 13.5–17.5)
MAGNESIUM SERPL-MCNC: 1.81 MG/DL (ref 1.6–2.4)
MCH RBC QN AUTO: 31.1 PG (ref 26–34)
MCHC RBC AUTO-ENTMCNC: 33 G/DL (ref 32–36)
MCV RBC AUTO: 94 FL (ref 80–100)
NRBC BLD-RTO: 0 /100 WBCS (ref 0–0)
P AXIS: 26 DEGREES
P OFFSET: 176 MS
P ONSET: 129 MS
PLATELET # BLD AUTO: 201 X10*3/UL (ref 150–450)
POTASSIUM SERPL-SCNC: 3.3 MMOL/L (ref 3.5–5.3)
PR INTERVAL: 168 MS
PROCALCITONIN SERPL-MCNC: 0.06 NG/ML
PROT SERPL-MCNC: 5.7 G/DL (ref 6.4–8.2)
Q ONSET: 213 MS
QRS COUNT: 13 BEATS
QRS DURATION: 96 MS
QT INTERVAL: 400 MS
QTC CALCULATION(BAZETT): 458 MS
QTC FREDERICIA: 438 MS
R AXIS: 66 DEGREES
RBC # BLD AUTO: 3.76 X10*6/UL (ref 4.5–5.9)
SODIUM SERPL-SCNC: 137 MMOL/L (ref 136–145)
T AXIS: 29 DEGREES
T OFFSET: 413 MS
VENTRICULAR RATE: 79 BPM
WBC # BLD AUTO: 14.5 X10*3/UL (ref 4.4–11.3)

## 2024-03-10 PROCEDURE — 2500000004 HC RX 250 GENERAL PHARMACY W/ HCPCS (ALT 636 FOR OP/ED)

## 2024-03-10 PROCEDURE — 82947 ASSAY GLUCOSE BLOOD QUANT: CPT

## 2024-03-10 PROCEDURE — 94668 MNPJ CHEST WALL SBSQ: CPT

## 2024-03-10 PROCEDURE — 99233 SBSQ HOSP IP/OBS HIGH 50: CPT

## 2024-03-10 PROCEDURE — 97165 OT EVAL LOW COMPLEX 30 MIN: CPT | Mod: GO

## 2024-03-10 PROCEDURE — 99232 SBSQ HOSP IP/OBS MODERATE 35: CPT | Performed by: STUDENT IN AN ORGANIZED HEALTH CARE EDUCATION/TRAINING PROGRAM

## 2024-03-10 PROCEDURE — 97530 THERAPEUTIC ACTIVITIES: CPT | Mod: GP | Performed by: PHYSICAL THERAPIST

## 2024-03-10 PROCEDURE — 36415 COLL VENOUS BLD VENIPUNCTURE: CPT

## 2024-03-10 PROCEDURE — 1200000002 HC GENERAL ROOM WITH TELEMETRY DAILY

## 2024-03-10 PROCEDURE — 86255 FLUORESCENT ANTIBODY SCREEN: CPT

## 2024-03-10 PROCEDURE — 97161 PT EVAL LOW COMPLEX 20 MIN: CPT | Mod: GP | Performed by: PHYSICAL THERAPIST

## 2024-03-10 PROCEDURE — 2500000002 HC RX 250 W HCPCS SELF ADMINISTERED DRUGS (ALT 637 FOR MEDICARE OP, ALT 636 FOR OP/ED)

## 2024-03-10 PROCEDURE — 94760 N-INVAS EAR/PLS OXIMETRY 1: CPT

## 2024-03-10 PROCEDURE — 83735 ASSAY OF MAGNESIUM: CPT

## 2024-03-10 PROCEDURE — 85027 COMPLETE CBC AUTOMATED: CPT

## 2024-03-10 PROCEDURE — 2500000001 HC RX 250 WO HCPCS SELF ADMINISTERED DRUGS (ALT 637 FOR MEDICARE OP)

## 2024-03-10 PROCEDURE — 2500000005 HC RX 250 GENERAL PHARMACY W/O HCPCS: Performed by: INTERNAL MEDICINE

## 2024-03-10 PROCEDURE — 94640 AIRWAY INHALATION TREATMENT: CPT

## 2024-03-10 PROCEDURE — 80053 COMPREHEN METABOLIC PANEL: CPT

## 2024-03-10 RX ORDER — POTASSIUM CHLORIDE 1.5 G/1.58G
40 POWDER, FOR SOLUTION ORAL ONCE
Status: COMPLETED | OUTPATIENT
Start: 2024-03-10 | End: 2024-03-10

## 2024-03-10 RX ORDER — LEVETIRACETAM 500 MG/1
500 TABLET ORAL 2 TIMES DAILY
Status: DISCONTINUED | OUTPATIENT
Start: 2024-03-10 | End: 2024-03-14 | Stop reason: HOSPADM

## 2024-03-10 RX ORDER — POLYETHYLENE GLYCOL 3350 17 G/17G
17 POWDER, FOR SOLUTION ORAL DAILY PRN
Status: DISCONTINUED | OUTPATIENT
Start: 2024-03-10 | End: 2024-03-14 | Stop reason: HOSPADM

## 2024-03-10 RX ADMIN — PANTOPRAZOLE SODIUM 40 MG: 40 TABLET, DELAYED RELEASE ORAL at 08:42

## 2024-03-10 RX ADMIN — GUAIFENESIN, DEXTROMETHORPHAN HBR 1 TABLET: 600; 30 TABLET ORAL at 21:05

## 2024-03-10 RX ADMIN — Medication 5 MG: at 21:05

## 2024-03-10 RX ADMIN — GABAPENTIN 100 MG: 100 CAPSULE ORAL at 21:13

## 2024-03-10 RX ADMIN — GABAPENTIN 100 MG: 100 CAPSULE ORAL at 21:09

## 2024-03-10 RX ADMIN — POTASSIUM CHLORIDE 40 MEQ: 1.5 POWDER, FOR SOLUTION ORAL at 08:42

## 2024-03-10 RX ADMIN — ROSUVASTATIN CALCIUM 5 MG: 10 TABLET, FILM COATED ORAL at 08:42

## 2024-03-10 RX ADMIN — IPRATROPIUM BROMIDE AND ALBUTEROL SULFATE 3 ML: 2.5; .5 SOLUTION RESPIRATORY (INHALATION) at 16:30

## 2024-03-10 RX ADMIN — IPRATROPIUM BROMIDE AND ALBUTEROL SULFATE 3 ML: 2.5; .5 SOLUTION RESPIRATORY (INHALATION) at 08:30

## 2024-03-10 RX ADMIN — IPRATROPIUM BROMIDE AND ALBUTEROL SULFATE 3 ML: 2.5; .5 SOLUTION RESPIRATORY (INHALATION) at 20:30

## 2024-03-10 RX ADMIN — Medication 1 TABLET: at 08:42

## 2024-03-10 RX ADMIN — SENNOSIDES 8.6 MG: 8.6 TABLET, FILM COATED ORAL at 21:05

## 2024-03-10 RX ADMIN — ENOXAPARIN SODIUM 40 MG: 40 INJECTION SUBCUTANEOUS at 17:23

## 2024-03-10 RX ADMIN — Medication 3 L/MIN: at 08:30

## 2024-03-10 RX ADMIN — Medication 3 L/MIN: at 16:30

## 2024-03-10 RX ADMIN — LEVETIRACETAM 500 MG: 5 INJECTION INTRAVENOUS at 08:45

## 2024-03-10 RX ADMIN — GUAIFENESIN, DEXTROMETHORPHAN HBR 1 TABLET: 600; 30 TABLET ORAL at 08:42

## 2024-03-10 RX ADMIN — SENNOSIDES 8.6 MG: 8.6 TABLET, FILM COATED ORAL at 08:42

## 2024-03-10 RX ADMIN — LEVETIRACETAM 500 MG: 500 TABLET, FILM COATED ORAL at 21:13

## 2024-03-10 ASSESSMENT — COGNITIVE AND FUNCTIONAL STATUS - GENERAL
CLIMB 3 TO 5 STEPS WITH RAILING: TOTAL
WALKING IN HOSPITAL ROOM: TOTAL
HELP NEEDED FOR BATHING: A LITTLE
TURNING FROM BACK TO SIDE WHILE IN FLAT BAD: A LITTLE
DRESSING REGULAR UPPER BODY CLOTHING: A LITTLE
STANDING UP FROM CHAIR USING ARMS: A LITTLE
DRESSING REGULAR LOWER BODY CLOTHING: A LITTLE
MOVING TO AND FROM BED TO CHAIR: A LITTLE
DAILY ACTIVITIY SCORE: 20
MOBILITY SCORE: 14
TOILETING: A LITTLE
MOVING FROM LYING ON BACK TO SITTING ON SIDE OF FLAT BED WITH BEDRAILS: A LITTLE

## 2024-03-10 ASSESSMENT — PAIN - FUNCTIONAL ASSESSMENT
PAIN_FUNCTIONAL_ASSESSMENT: 0-10

## 2024-03-10 ASSESSMENT — PAIN SCALES - GENERAL
PAINLEVEL_OUTOF10: 0 - NO PAIN

## 2024-03-10 NOTE — PROGRESS NOTES
"Mike Franco is a 64 y.o. male on day 3 of admission presenting with Seizure (CMS/HCC).    Subjective   The patient reports that he is nearly back to his baseline this morning. Feeling fatigued. Otherwise no new complaints.    Objective     Last Recorded Vitals  Blood pressure 135/90, pulse 98, temperature 36.7 °C (98.1 °F), temperature source Temporal, resp. rate (!) 27, height 1.828 m (5' 11.97\"), weight 81.6 kg (179 lb 14.3 oz), SpO2 94 %.    Mental status: A&Ox3. Follows simple commands. Language intact to naming, comprehension and repetition.   CN: EOM full range, no nystagmus. Muscles of facial expression intact.   Motor: LUE with 5/5 strength. RUE 5/5 strength. RLE 5/5  and LLE 5/5  Sensory: Normal sensation to LT bilaterally.  Coordination. Normal finger to nose bilaterally. CHANTELLE intact    Relevant Results    MRI brain with and without contrast    IMPRESSION:  Diffuse leptomeningeal enhancement principally overlying the  cerebellum appearing since the prior examination suggesting  leptomeningeal neoplastic although meningitis for instance could have a similar appearance in the appropriate clinical context.     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 There is not a great description of the event that took place, but based on wife's description and post ictal state in the ED with improvement towards baseline, elevated lactate (though no repeat available). Pt certainly has seizure risk factors, especially now in light of MRI findings below, that would justify continuing Keppra.     MRI brain w and wo contrast shows diffuse leptomeningeal enhancement primarily over the cerebellum which is new since his prior MRI brain. 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 No cerebellar findings on exam this morning.    - rEEG ordered  - LP with cell count and " diff, glucose, protein, cytology, autoimmune encephalopathy panel. CSF culture.  - Continue Keppra 500 mg BID     Educated patient that they should not drive, operate heavy machinery, work around sharp objects or open flames, work on high surfaces, or swim or bath alone, or any other activity where they would be a risk to himself or others if a seizure were to occur for at least 6 months and until cleared by a neurologist.    I spent 35 minutes in the professional and overall care of this patient.    Jorge Alberto Herring MD

## 2024-03-10 NOTE — PROGRESS NOTES
Occupational Therapy    Evaluation    Patient Name: Mike Franco  MRN: [id number]  Today's Date: 3/10/2024  Time Calculation  Start Time: 0931  Stop Time: 0952  Time Calculation (min): 21 min        Assessment:  Prognosis: Good  Barriers to Discharge: None  Evaluation/Treatment Tolerance: Patient limited by fatigue, Patient tolerated treatment well  Medical Staff Made Aware: Yes  End of Session Communication: Bedside nurse  End of Session Patient Position: Up in chair, Alarm on  OT Assessment Results: Decreased ADL status, Decreased endurance  Prognosis: Good  Barriers to Discharge: None  Evaluation/Treatment Tolerance: Patient limited by fatigue, Patient tolerated treatment well  Medical Staff Made Aware: Yes  Strengths: Support of Caregivers  Plan:  Treatment Interventions: Functional transfer training, ADL retraining  OT Frequency: 2 times per week  OT Discharge Recommendations: No OT needed after discharge  OT Recommended Transfer Status: Stand by assist  OT - OK to Discharge: Yes  Treatment Interventions: Functional transfer training, ADL retraining    Subjective   Current Problem:  1. Seizure (CMS/HCC)          General:  General  Reason for Referral: Seizure  Past Medical History Relevant to Rehab: R AKA, Lung CA  Co-Treatment: PT (Pt was seen with PT due to AKA and to maximize safety.)  Patient Position Received: Bed, 3 rail up  General Comment: 3L of O2  Precautions:     Vital Signs:  SpO2: 94 %  Pain:  Pain Assessment  Pain Assessment: 0-10  Pain Score:  (Pt reports pain, however did not provide a number for it.)  Pain Type: Chronic pain    Objective   Cognition:  Orientation Level: Oriented X4           Home Living:  Type of Home: House  Lives With: Significant other  Home Layout: One level  Home Access: Ramped entrance  Bathroom Shower/Tub: Tub/shower unit  Bathroom Equipment:  (shower chair)  Prior Function:  Level of [place]: Independent with ADLs and functional transfers  Hand Dominance:  Right  Prior Function Comments:  (Pt wears his orthosis or is in his W/C)  IADL History  ADL:  ADL Comments:  (Pt reports independence with ADL'S)  Activity Tolerance:     Bed Mobility/Transfers: Bed Mobility  Bed Mobility: Yes  Bed Mobility 1  Bed Mobility 1: Supine to sitting  Level of Assistance 1: Minimum assistance    Transfer 1  Transfer From 1: Bed to  Transfer to 1: Chair with arms  Technique 1: Stand pivot  Transfer Level of Assistance 1: Contact guard      Functional Mobility:  Functional Mobility  Functional Mobility Performed: No  Sitting Balance:  Static Sitting Balance  Static Sitting-Balance Support: No upper extremity supported   Modalities:     Vision:Vision - Basic Assessment  Patient Visual Report: Diplopia (Pt also reports dizziness upon sitting at EOB.)       Extremities: RUE AROM (degrees)  RUE AROM Comment: WFL and LUE AROM (degrees)  LUE AROM Comment: WFL    Outcome Measures:[facility] Daily Activity  Putting on and taking off regular lower body clothing: A little  Bathing (including washing, rinsing, drying): A little  Putting on and taking off regular upper body clothing: A little  Toileting, which includes using toilet, bedpan or urinal: A little  Taking care of personal grooming such as brushing teeth: None  Eating Meals: None  Daily Activity - Total Score: 20        Education Documentation  Precautions, taught by Seth Martin OT at 3/10/2024 10:21 AM.  Learner: Patient  Readiness: Acceptance  Method: Explanation  Response: Verbalizes Understanding    Education Comments  No comments found.        OP EDUCATION:  Education  Individual(s) Educated: Patient  Education Provided: Risk and benefits of OT discussed with patient or other, POC discussed and agreed upon  Risk and Benefits Discussed with Patient/Caregiver/Other: yes  Patient/Caregiver Demonstrated Understanding: yes  Plan of Care Discussed and Agreed Upon: yes  Patient Response to Education: Patient/Caregiver Verbalized Understanding  of Information    Goals:  Encounter Problems       Encounter Problems (Active)       OT Problem       PATIENT tolerate 15 minutes of activity without SOB or rest break to demonstrate improved endurance.  (Progressing)       Start:  03/10/24    Expected End:  03/31/24            PATIENT WILL DEMONSTRATE INDEPENDENCE WITH LOWER BODY donning and doffing all LE clothes  (Progressing)       Start:  03/10/24    Expected End:  03/31/24            PATIENT WILL PERFORM CHAIR TO AND FROM BED TRANSFER WITH supervision (Progressing)       Start:  03/10/24    Expected End:  03/31/24            PATIENT WILL PERFORM SUPINE TO SIT ON BED WITH supervision DEMONSTRATING CONTROL (Progressing)       Start:  03/10/24    Expected End:  03/31/24            PATIENT WILL PERFORM SIT TO SUPINE ON BED WITH supervision DEMONSTRATING CONTROL (Progressing)       Start:  03/10/24    Expected End:  03/31/24            PATIENT WILL PERFORM BATHING ACTIVITIES SEATED WITH set up  (Progressing)       Start:  03/10/24    Expected End:  03/31/24

## 2024-03-10 NOTE — CARE PLAN
Problem: OT Problem  Goal: PATIENT tolerate 15 minutes of activity without SOB or rest break to demonstrate improved endurance.   Outcome: Progressing  Goal: PATIENT WILL DEMONSTRATE INDEPENDENCE WITH LOWER BODY donning and doffing all LE clothes   Outcome: Progressing  Goal: PATIENT WILL PERFORM CHAIR TO AND FROM BED TRANSFER WITH supervision  Outcome: Progressing  Goal: PATIENT WILL PERFORM SUPINE TO SIT ON BED WITH supervision DEMONSTRATING CONTROL  Outcome: Progressing  Goal: PATIENT WILL PERFORM SIT TO SUPINE ON BED WITH supervision DEMONSTRATING CONTROL  Outcome: Progressing  Goal: PATIENT WILL PERFORM BATHING ACTIVITIES SEATED WITH set up   Outcome: Progressing

## 2024-03-10 NOTE — PROGRESS NOTES
Physical Therapy    Physical Therapy Evaluation    Patient Name: Mike Franco  MRN: [id number]  Today's Date: 3/10/2024   Time Calculation  Start Time: 0927  Stop Time: 0952  Time Calculation (min): 25 min    Assessment/Plan   PT Assessment  PT Assessment Results: Decreased strength, Decreased endurance, Decreased mobility  Rehab Prognosis: Good  Evaluation/Treatment Tolerance: Patient limited by fatigue  Medical Staff Made Aware: Yes  End of Session Communication: Bedside nurse  End of Session Patient Position: Up in chair, Alarm on  IP OR SWING BED PT PLAN  Inpatient or Swing Bed: Inpatient  PT Plan  Treatment/Interventions: Bed mobility, Transfer training, Gait training, Endurance training  PT Plan: Skilled PT  PT Frequency: 3 times per week  PT Discharge Recommendations: Low intensity level of continued care  PT - OK to Discharge: Yes      Subjective   General Visit Information:  General  Reason for Referral: 63 yo male admitted for seizure and change in mental status, referred to PT for impaired mobility.  Past Medical History Relevant to Rehab: metastatic lung CA, CAD, DM, HLD, R AKA with phantom limb pain, IVC filter.  Co-Treatment: OT  Co-Treatment Reason: improve patient outcomes  Prior to Session Communication: Bedside nurse  Patient Position Received: Alarm on, Bed, 3 rail up  General Comment: 3L O2 NC, spO2 monitor, ICU telemetry/BP monitoring, IV. Pt mentioned dizziness/double vision upon sitting EOB, RN aware. No prostetic at hospital for R AKA.  Home Living:  Home Living  Type of Home: House  Lives With: Significant other  Home Adaptive Equipment: Wheelchair-manual, Cane  Home Layout: One level  Home Access: Ramped entrance  Bathroom Shower/Tub: Walk-in shower  Prior Level of Function:  Prior Function Per Pt/Caregiver Report  Level of [place]: Independent with ADLs and functional transfers  Precautions:     Vital Signs:       Objective   Pain:  Pain Assessment  Pain Assessment: 0-10  Pain Score:   "(generalized pain \"all over from laying in bed\" not formally rated)  Cognition:  Cognition  Overall Cognitive Status: Within Functional Limits  Orientation Level: Oriented X4    General Assessments:     Activity Tolerance  Endurance: Tolerates less than 10 min exercise with changes in vital signs    Sensation  Light Touch: No apparent deficits     Static Sitting Balance  Static Sitting-Balance Support: Bilateral upper extremity supported  Functional Assessments:  Bed Mobility  Bed Mobility: Yes  Bed Mobility 1  Bed Mobility 1: Supine to sitting  Level of Assistance 1: Minimum assistance    Transfers  Transfer: Yes  Transfer 1  Transfer From 1: Bed to  Transfer to 1: Chair with arms  Technique 1: Stand pivot  Transfer Level of Assistance 1: Contact guard  Trials/Comments 1: towards L side for strong side transfer  Extremity/Trunk Assessments:  LLE   LLE :  (not formally tested, >3+/5 for functional transfer)  Outcome Measures:  [facility] Basic Mobility  Turning from your back to your side while in a flat bed without using bedrails: A little  Moving from lying on your back to sitting on the side of a flat bed without using bedrails: A little  Moving to and from bed to chair (including a wheelchair): A little  Standing up from a chair using your arms (e.g. wheelchair or bedside chair): A little  To walk in hospital room: Total  Climbing 3-5 steps with railing: Total  Basic Mobility - Total Score: 14    Encounter Problems       Encounter Problems (Active)       Mobility       STG - Patient will ambulate with use of least restrictive assistive device and prosthetic >75ft  (Progressing)       Start:  03/10/24    Expected End:  03/10/24               PT Transfers       STG - Patient will perform bed mobility at modified independent level.  (Progressing)       Start:  03/10/24    Expected End:  03/10/24            STG - Patient will transfer sit to and from stand with least restrictive assistive device at modified independent " level.  (Progressing)       Start:  03/10/24    Expected End:  03/10/24                   Education Documentation  Mobility Training, taught by Juana Natarajan PT at 3/10/2024 10:24 AM.  Learner: Patient  Readiness: Acceptance  Method: Explanation  Response: Verbalizes Understanding    Handouts, taught by Juana Natarajan PT at 3/10/2024 10:24 AM.  Learner: Patient  Readiness: Acceptance  Method: Explanation  Response: Verbalizes Understanding    Body Mechanics, taught by Juana Natarajan PT at 3/10/2024 10:24 AM.  Learner: Patient  Readiness: Acceptance  Method: Explanation  Response: Verbalizes Understanding    Precautions, taught by Juana Natarajan PT at 3/10/2024 10:24 AM.  Learner: Patient  Readiness: Acceptance  Method: Explanation  Response: Verbalizes Understanding    Home Exercise Program, taught by Juana Natarajan PT at 3/10/2024 10:24 AM.  Learner: Patient  Readiness: Acceptance  Method: Explanation  Response: Verbalizes Understanding    ADL Training, taught by Juana Natarajan PT at 3/10/2024 10:24 AM.  Learner: Patient  Readiness: Acceptance  Method: Explanation  Response: Verbalizes Understanding    Education Comments  No comments found.

## 2024-03-10 NOTE — CARE PLAN
The patient's goals for the shift include jessie    The clinical goals for the shift include Patient will remain safe during shift    Over the shift, the patient did not make progress toward the following goals. Barriers to progression include . Recommendations to address these barriers include .

## 2024-03-10 NOTE — PROGRESS NOTES
Intensive Care Unit - Daily Progress Note   Hospital Day: 4       Name:Mike Franco, AGE: 64 y.o., GENDER: male, MRN: [id number], ROOM: 06/06-A   CODE STATUS: Full Code  Attending Physician: Chantal Wahl MD  Admission Date: 3/7/2024        Subjective:    ICU length of stay: 2d 16h     Overnight events: none    The patient was seen and examined at bedside. Pt feeling better today, has some intermittent SOB on 3L oxygen NC while coughing, overnight he was on 5 L oxygen. No return of seizure-like symptoms.  Denies chest pain, abdominal pain, fever, chills, numbness, tingling, diarrhea, constipation, or urinary symptoms but mild headache.  No bowel movement since admission.    No acute events overnight    Objective:    Vitals:   Vitals:    03/10/24 0800 03/10/24 0830 03/10/24 0900 03/10/24 0917   BP: 135/90      BP Location: Left arm      Patient Position: Lying      Pulse: 98      Resp: (!) 27      Temp: 36.7 °C (98.1 °F)      TempSrc: Temporal      SpO2: 93% 94% 94%    Weight:    79.4 kg (175 lb 0.7 oz)   Height:            Intake/Output Summary (Last 24 hours) at 3/10/2024 1027  Last data filed at 3/10/2024 0544  Gross per 24 hour   Intake 1100 ml   Output 1150 ml   Net -50 ml          Physical Examination:   Constitutional:       General: He is not in acute distress.     Appearance: He is not diaphoretic.   HENT:      Head: Normocephalic and atraumatic.   Eyes:      General: No scleral icterus.     Extraocular Movements: Extraocular movements intact.      Conjunctiva/sclera: Conjunctivae normal.      Pupils: Pupils are equal, round, and reactive to light.   Cardiovascular:      Rate and Rhythm: Normal rate and regular rhythm.      Pulses: Normal pulses.      Heart sounds: Normal heart sounds. No murmur heard.     No friction rub. No gallop.   Pulmonary:      Effort: Pulmonary effort is normal. No respiratory distress.      Breath sounds: Normal breath sounds. No stridor. No wheezing, rhonchi or rales.       Comments: 3L NC  Chest:      Chest wall: No tenderness.   Abdominal:      General: Abdomen is flat. Bowel sounds are normal. There is no distension.      Palpations: Abdomen is soft. There is no mass.      Tenderness: There is no abdominal tenderness. There is no guarding or rebound.      Hernia: No hernia is present.   Musculoskeletal:         General: No swelling, tenderness or signs of injury. Normal range of motion.      Cervical back: Normal range of motion. No rigidity or tenderness.      Right lower leg: No edema.      Left lower leg: No edema.      Comments: Right AKA   Skin:     General: Skin is warm and dry.      Findings: No lesion or rash.   Neurological:      General: No focal deficit present.      Mental Status: He is alert. Mental status is at baseline.   Psychiatric:         Mood and Affect: Mood normal.         Behavior: Behavior normal.          Labs:   Results from last 7 days   Lab Units 03/10/24  0544 03/09/24  0555 03/08/24  0521   WBC AUTO x10*3/uL 14.5* 12.2* 14.6*   HEMOGLOBIN g/dL 11.7* 11.6* 11.8*   MCV fL 94 96 94   PLATELETS AUTO x10*3/uL 201 211 250   HEMATOCRIT % 35.5* 35.3* 36.3*       Results from last 7 days   Lab Units 03/10/24  0544 03/09/24  0555 03/08/24  0521 03/08/24  0405 03/07/24  1508 03/07/24  1443   SODIUM mmol/L 137 141 135*  --  139  --    POTASSIUM mmol/L 3.3* 2.9* 3.2*  --  3.9  --    BUN mg/dL 12 13 20  --  22  --    CREATININE mg/dL 0.68 0.90 1.17  --  1.15  --    CHLORIDE mmol/L 103 107 105  --  95*  --    POCT HCO3 CALCULATED, VENOUS mmol/L  --   --   --  24.3 24.1 13.2*   MAGNESIUM mg/dL 1.81 1.91 2.01  --  2.45*  --    PHOSPHORUS mg/dL  --   --  3.7  --  5.1*  --        Results from last 7 days   Lab Units 03/08/24  0521 03/04/24  1734 03/04/24  1642 03/04/24  1420   TROPHS ng/L 18  --  <3  --    BNP pg/mL  --   --  13  --    D-DIMER, QUANTITATIVE VTE EXCLUSION ng/mL FEU  --  881*  --   --    TSH mIU/L  --   --   --  2.35         Imaging:   MR brain w and wo IV  contrast  Narrative: Interpreted By:  Harry Damon,   STUDY:  MR BRAIN W AND WO IV CONTRAST;  3/9/2024 11:40 am      INDICATION:  Signs/Symptoms:new onset seizures, hx metastatic cancer.      COMPARISON:  03/07/2024 head CT and 01/12/2024 head MRI.      ACCESSION NUMBER(S):  [id number]      ORDERING CLINICIAN:  ALLYSON SONI      TECHNIQUE:  Axial T2, FLAIR, DWI, gradient echo T2 and sagittal and coronal T1  weighted images of brain were acquired. Post contrast T1 weighted  images were acquired after administration of  gadolinium based  intravenous contrast.      FINDINGS:  Evaluation is limited due to patient motion moderate diffuse  leptomeningeal enhancement overlying the cerebellar folia rostral  greater than caudal and with additional leptomeningeal enhancement  likely within the interpeduncular cistern and probably within the  sylvian fissures as well as suggestion of minimal prominence  pachymeningeal enhancement overlying both hemispheres including along  the anterior aspect of the interhemispheric fissure. Minimal  nonspecific scattered periventricular and subcortical T2 FLAIR  hyperintensities may be due to chronic small vessel ischemic changes  given the age of the patient with minimal nonspecific FLAIR  hyperintensity as well in the region of the deep left cerebellar  nuclei. No pathologic parenchymal increased susceptibility. Minimal  dilatation of the lateral and 3rd ventricles without significant  change allowing for technique compared to the prior head CT. No  parenchymal restricted diffusion to suggest recent ischemia. No mass  effect or midline shift. Major intracranial flow voids are patent.  Visualized paranasal sinuses and mastoid air cells are clear.      Impression: Diffuse leptomeningeal enhancement principally overlying the  cerebellum appearing since the prior examination suggesting  leptomeningeal neoplastic although meningitis for instance could have  a similar appearance in the  "appropriate clinical context. Please  correlate clinically.      MACRO:  Critical Finding:  See findings. Notification was initiated on  3/9/2024 at 2:19 pm by  Harry Damon.  (**-OCF-**)      Signed by: Harry Damon 3/9/2024 2:19 PM  Dictation workstation:   [id number]      Microbiology:   Susceptibility data from last 90 days.  Collected Specimen Info Organism   03/07/24 Fluid from Tracheal Aspirate Normal throat emelia                      No lab exists for component: \"AGALPCRNB\"   .ID  Lab Results   Component Value Date    BLOODCULT No growth at 2 days 03/08/2024    BLOODCULT No growth at 2 days 03/08/2024       Medications:    Scheduled Medications:   dextromethorphan-guaifenesin, 1 tablet, oral, q12h  enoxaparin, 40 mg, subcutaneous, q24h  insulin lispro, 0-5 Units, subcutaneous, TID with meals  ipratropium-albuteroL, 3 mL, nebulization, 4x daily  levETIRAcetam, 500 mg, intravenous, BID  melatonin, 5 mg, oral, Nightly  multivitamin with minerals, 1 tablet, oral, Daily  pantoprazole, 40 mg, oral, Daily before breakfast  rosuvastatin, 5 mg, oral, Daily  sennosides, 1 tablet, oral, BID      Continuous Medications:        PRN Medications:   PRN medications: albuterol, benzocaine-menthol, benzonatate, dextrose 10 % in water (D10W), dextrose, gabapentin, glucagon, oxyCODONE, oxygen, polyethylene glycol     Assessment and Plan:    Mike Jettwendy is a 64 y.o. 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      Neurology:   #acute metabolic encephalopathy(resolved)  #likely seizure, new-onset  -seizure precautions  -fall precautions  -MRI " brain with and without IV contrast: Diffuse leptomeningeal enhancement principally overlying the cerebellum appearing since the prior examination suggesting leptomeningeal neoplastic although meningitis for instance could have a similar appearance in the appropriate clinical context. Please correlate clinically.   -Neurology( Dr Herring)  on board, recommended  LP to rule out any infectious process, continuing Keppra and follow-up with neurology as outpatient, also to inform his oncologist regarding the MRI finding as he thinks it might change the way of his treatment  -EEG pending.   -continue Keppra 500 mg orally bid.   - LP failed today, consulted IR tomorrow.        Cardiovascular:   #A-fib with RVR, new onset - resolved  - Tachycardic over 200 bpm in ED  -s/p cardioversion x3 unsuccessfully in ED  -s/p amiodarone drip in ED  - reverted to normal sinus rhythm  - stopped amiodarone drip,   - Currently in sinus rhythm  -monitor     #hypotension  -BP today 130/80  - continue fluid assessment and replete as needed     #HLD  - Crestor 5 mg daily     Respiratory:   #inability to protect airway 2/2 seizure - resolved  - s/p intubated and placed on ventilator in ED    - extubated 3/8  - on 3L oxygen NC  - wean as tolerated  - continue home albuterol inhaler     Gastrointestinal:  #bowel regimen  -No bowel movement since admission  - Senokot 8.6 mg PO BID  -MiraLAX as needed nightly.      Heme Onc:  #small cell lung CA with bone mets  #immunocompromised  - receiving active chemo/immuno therapy     #DVT prophylaxis  - lovenox 40 mg subcutaneous Q24H  - SCDs     Infectious Disease:   #CAP  #possible HAP  - pt had recent hospital admission around 01/11/24 for 3 days   -s/p zosyn in ED   -negative MRSA, influenza, COVID  -stopped vanc IV  - Procal 0.13, infectious pna less likely  -blood Cx: NGTD x1 day  - tracheal aspirate Cx: no PMNs, moderate gram positive and negative bacteria, possible contamination    - stopped  ceftriaxzone (03/07- 03/09), finished azithromycin (3/7 - 3/9)    /Renal:   #elevated lactate(resolved)  #metabolic acidosis (resolved)    Musculoskeletal:   #phantom pain for right AKA  - hold home roxicodone 5 mg PO Q6H PRN     #peripheral neuropathy  - home gabapentin 100 mg PO nightly PRN    #weakness  - PT/OT on board  - PT recommended low intensity level of continued care     Endocrinology:   #hyperglycemia  - lispro SS #1     F: Replete as needed  E: Replete as needed  N: regular  G: protonix  VTE: Lovenox, SCDs  Antibiotics: Azithromycin (3/7 -3/9); Vanc (3/7), Ceftriaxone (3/7-3/9),   Lines/Torrez: PIV x3 in UE     Code:   Full Code     Disposition: 64 y.o.male admitted for intubation on ventilation to protect airway 2/2 seizure activity.  Neurology following.  LP today . pt is stable and ready for transfer to medical floor with telemetry monitoring.    Bear Lee MD

## 2024-03-11 ENCOUNTER — APPOINTMENT (OUTPATIENT)
Dept: NEUROLOGY | Facility: HOSPITAL | Age: 64
DRG: 100 | End: 2024-03-11
Payer: COMMERCIAL

## 2024-03-11 ENCOUNTER — TELEPHONE (OUTPATIENT)
Dept: ADMISSION | Facility: HOSPITAL | Age: 64
End: 2024-03-11
Payer: COMMERCIAL

## 2024-03-11 LAB
ALBUMIN SERPL BCP-MCNC: 3 G/DL (ref 3.4–5)
ALP SERPL-CCNC: 46 U/L (ref 33–136)
ALT SERPL W P-5'-P-CCNC: 15 U/L (ref 10–52)
ANION GAP SERPL CALC-SCNC: 12 MMOL/L (ref 10–20)
AST SERPL W P-5'-P-CCNC: 16 U/L (ref 9–39)
BILIRUB SERPL-MCNC: 0.6 MG/DL (ref 0–1.2)
BUN SERPL-MCNC: 17 MG/DL (ref 6–23)
CA-I BLD-SCNC: 1.16 MMOL/L (ref 1.1–1.33)
CALCIUM SERPL-MCNC: 8.3 MG/DL (ref 8.6–10.3)
CHLORIDE SERPL-SCNC: 100 MMOL/L (ref 98–107)
CO2 SERPL-SCNC: 29 MMOL/L (ref 21–32)
CREAT SERPL-MCNC: 0.59 MG/DL (ref 0.5–1.3)
EGFRCR SERPLBLD CKD-EPI 2021: >90 ML/MIN/1.73M*2
ERYTHROCYTE [DISTWIDTH] IN BLOOD BY AUTOMATED COUNT: 15.1 % (ref 11.5–14.5)
GLUCOSE BLD MANUAL STRIP-MCNC: 104 MG/DL (ref 74–99)
GLUCOSE BLD MANUAL STRIP-MCNC: 105 MG/DL (ref 74–99)
GLUCOSE BLD MANUAL STRIP-MCNC: 107 MG/DL (ref 74–99)
GLUCOSE BLD MANUAL STRIP-MCNC: 118 MG/DL (ref 74–99)
GLUCOSE SERPL-MCNC: 98 MG/DL (ref 74–99)
HCT VFR BLD AUTO: 35.2 % (ref 41–52)
HGB BLD-MCNC: 11.6 G/DL (ref 13.5–17.5)
MAGNESIUM SERPL-MCNC: 1.67 MG/DL (ref 1.6–2.4)
MCH RBC QN AUTO: 30.8 PG (ref 26–34)
MCHC RBC AUTO-ENTMCNC: 33 G/DL (ref 32–36)
MCV RBC AUTO: 93 FL (ref 80–100)
NRBC BLD-RTO: 0 /100 WBCS (ref 0–0)
PLATELET # BLD AUTO: 228 X10*3/UL (ref 150–450)
POTASSIUM SERPL-SCNC: 3.2 MMOL/L (ref 3.5–5.3)
PROT SERPL-MCNC: 5.7 G/DL (ref 6.4–8.2)
RBC # BLD AUTO: 3.77 X10*6/UL (ref 4.5–5.9)
SODIUM SERPL-SCNC: 138 MMOL/L (ref 136–145)
WBC # BLD AUTO: 11.3 X10*3/UL (ref 4.4–11.3)

## 2024-03-11 PROCEDURE — 99231 SBSQ HOSP IP/OBS SF/LOW 25: CPT

## 2024-03-11 PROCEDURE — 82330 ASSAY OF CALCIUM: CPT

## 2024-03-11 PROCEDURE — 2500000001 HC RX 250 WO HCPCS SELF ADMINISTERED DRUGS (ALT 637 FOR MEDICARE OP)

## 2024-03-11 PROCEDURE — 80053 COMPREHEN METABOLIC PANEL: CPT

## 2024-03-11 PROCEDURE — 2500000002 HC RX 250 W HCPCS SELF ADMINISTERED DRUGS (ALT 637 FOR MEDICARE OP, ALT 636 FOR OP/ED)

## 2024-03-11 PROCEDURE — 1200000002 HC GENERAL ROOM WITH TELEMETRY DAILY

## 2024-03-11 PROCEDURE — 95819 EEG AWAKE AND ASLEEP: CPT | Performed by: STUDENT IN AN ORGANIZED HEALTH CARE EDUCATION/TRAINING PROGRAM

## 2024-03-11 PROCEDURE — 83735 ASSAY OF MAGNESIUM: CPT

## 2024-03-11 PROCEDURE — 36415 COLL VENOUS BLD VENIPUNCTURE: CPT

## 2024-03-11 PROCEDURE — 2500000004 HC RX 250 GENERAL PHARMACY W/ HCPCS (ALT 636 FOR OP/ED)

## 2024-03-11 PROCEDURE — 2500000005 HC RX 250 GENERAL PHARMACY W/O HCPCS

## 2024-03-11 PROCEDURE — 94760 N-INVAS EAR/PLS OXIMETRY 1: CPT

## 2024-03-11 PROCEDURE — 99232 SBSQ HOSP IP/OBS MODERATE 35: CPT | Performed by: INTERNAL MEDICINE

## 2024-03-11 PROCEDURE — 82947 ASSAY GLUCOSE BLOOD QUANT: CPT

## 2024-03-11 PROCEDURE — 94640 AIRWAY INHALATION TREATMENT: CPT

## 2024-03-11 PROCEDURE — 95819 EEG AWAKE AND ASLEEP: CPT

## 2024-03-11 PROCEDURE — 94668 MNPJ CHEST WALL SBSQ: CPT

## 2024-03-11 PROCEDURE — 99233 SBSQ HOSP IP/OBS HIGH 50: CPT

## 2024-03-11 PROCEDURE — 85027 COMPLETE CBC AUTOMATED: CPT

## 2024-03-11 RX ORDER — LANOLIN ALCOHOL/MO/W.PET/CERES
400 CREAM (GRAM) TOPICAL ONCE
Status: COMPLETED | OUTPATIENT
Start: 2024-03-11 | End: 2024-03-11

## 2024-03-11 RX ORDER — POTASSIUM CHLORIDE 1.5 G/1.58G
60 POWDER, FOR SOLUTION ORAL ONCE
Status: COMPLETED | OUTPATIENT
Start: 2024-03-11 | End: 2024-03-11

## 2024-03-11 RX ADMIN — LEVETIRACETAM 500 MG: 500 TABLET, FILM COATED ORAL at 08:32

## 2024-03-11 RX ADMIN — Medication 1 TABLET: at 08:31

## 2024-03-11 RX ADMIN — Medication 4 L/MIN: at 20:30

## 2024-03-11 RX ADMIN — LEVETIRACETAM 500 MG: 500 TABLET, FILM COATED ORAL at 21:04

## 2024-03-11 RX ADMIN — Medication 400 MG: at 08:37

## 2024-03-11 RX ADMIN — SENNOSIDES 8.6 MG: 8.6 TABLET, FILM COATED ORAL at 08:33

## 2024-03-11 RX ADMIN — ROSUVASTATIN CALCIUM 5 MG: 10 TABLET, FILM COATED ORAL at 08:32

## 2024-03-11 RX ADMIN — POTASSIUM CHLORIDE 60 MEQ: 1.5 POWDER, FOR SOLUTION ORAL at 08:37

## 2024-03-11 RX ADMIN — Medication 5 MG: at 21:05

## 2024-03-11 RX ADMIN — SENNOSIDES 8.6 MG: 8.6 TABLET, FILM COATED ORAL at 21:05

## 2024-03-11 RX ADMIN — IPRATROPIUM BROMIDE AND ALBUTEROL SULFATE 3 ML: 2.5; .5 SOLUTION RESPIRATORY (INHALATION) at 11:13

## 2024-03-11 RX ADMIN — IPRATROPIUM BROMIDE AND ALBUTEROL SULFATE 3 ML: 2.5; .5 SOLUTION RESPIRATORY (INHALATION) at 20:30

## 2024-03-11 RX ADMIN — IPRATROPIUM BROMIDE AND ALBUTEROL SULFATE 3 ML: 2.5; .5 SOLUTION RESPIRATORY (INHALATION) at 15:58

## 2024-03-11 RX ADMIN — IPRATROPIUM BROMIDE AND ALBUTEROL SULFATE 3 ML: 2.5; .5 SOLUTION RESPIRATORY (INHALATION) at 07:58

## 2024-03-11 RX ADMIN — GUAIFENESIN, DEXTROMETHORPHAN HBR 1 TABLET: 600; 30 TABLET ORAL at 21:04

## 2024-03-11 ASSESSMENT — PAIN SCALES - GENERAL: PAINLEVEL_OUTOF10: 0 - NO PAIN

## 2024-03-11 ASSESSMENT — COGNITIVE AND FUNCTIONAL STATUS - GENERAL
DAILY ACTIVITIY SCORE: 20
DAILY ACTIVITIY SCORE: 20
CLIMB 3 TO 5 STEPS WITH RAILING: TOTAL
HELP NEEDED FOR BATHING: A LITTLE
DRESSING REGULAR UPPER BODY CLOTHING: A LITTLE
CLIMB 3 TO 5 STEPS WITH RAILING: TOTAL
WALKING IN HOSPITAL ROOM: TOTAL
MOBILITY SCORE: 14
TOILETING: A LITTLE
HELP NEEDED FOR BATHING: A LITTLE
DRESSING REGULAR LOWER BODY CLOTHING: A LITTLE
CLIMB 3 TO 5 STEPS WITH RAILING: TOTAL
DRESSING REGULAR UPPER BODY CLOTHING: A LITTLE
TURNING FROM BACK TO SIDE WHILE IN FLAT BAD: A LITTLE
STANDING UP FROM CHAIR USING ARMS: A LITTLE
MOVING TO AND FROM BED TO CHAIR: A LITTLE
WALKING IN HOSPITAL ROOM: TOTAL
TOILETING: A LITTLE
MOVING FROM LYING ON BACK TO SITTING ON SIDE OF FLAT BED WITH BEDRAILS: A LITTLE
MOVING TO AND FROM BED TO CHAIR: A LITTLE
MOBILITY SCORE: 14
MOVING FROM LYING ON BACK TO SITTING ON SIDE OF FLAT BED WITH BEDRAILS: A LITTLE
TOILETING: A LITTLE
TURNING FROM BACK TO SIDE WHILE IN FLAT BAD: A LITTLE
DAILY ACTIVITIY SCORE: 20
DRESSING REGULAR LOWER BODY CLOTHING: A LITTLE
MOVING TO AND FROM BED TO CHAIR: A LITTLE
TURNING FROM BACK TO SIDE WHILE IN FLAT BAD: A LITTLE
DRESSING REGULAR LOWER BODY CLOTHING: A LITTLE
MOVING FROM LYING ON BACK TO SITTING ON SIDE OF FLAT BED WITH BEDRAILS: A LITTLE
DRESSING REGULAR UPPER BODY CLOTHING: A LITTLE
STANDING UP FROM CHAIR USING ARMS: A LITTLE
STANDING UP FROM CHAIR USING ARMS: A LITTLE
HELP NEEDED FOR BATHING: A LITTLE
MOBILITY SCORE: 14
WALKING IN HOSPITAL ROOM: TOTAL

## 2024-03-11 ASSESSMENT — PAIN - FUNCTIONAL ASSESSMENT: PAIN_FUNCTIONAL_ASSESSMENT: 0-10

## 2024-03-11 ASSESSMENT — VISUAL ACUITY: VA_NORMAL: 1

## 2024-03-11 NOTE — PROGRESS NOTES
Patient: Mike Franco Age: 64 y.o.   Gender: male Room/bed: 127/127-A     Attending: Mauro Argueta MD  Code Status:  Full Code    Overnight Events     No acute events overnight     Subjective   Patient states that he is feeling ok today. He feels weaker than normal and feels very tired. Has not had a bowel movement but would like to wait until after the lumbar puncture.   Objective    Constitutional: Appears stated age. In NAD.   CV: RRR, No M/R/G  PULM: CTAB, no coughing or wheezing  ABDOMEN: Soft, NT/ND. No TTP. + BSx4.  SKIN: Normal Color, Warm, Dry, No Rashes   EXTREMITIES: R AKA, non-tender, no LE edema   NEURO: A&O x 3, nonfocal neurological exam.  PSYCH: Normal Mood & Behavior    Temp:  [36.2 °C (97.2 °F)-37.2 °C (99 °F)] 37.2 °C (99 °F)  Heart Rate:  [] 89  Resp:  [11-28] 16  BP: (115-136)/() 116/71      Intake/Output Summary (Last 24 hours) at 3/11/2024 1446  Last data filed at 3/10/2024 2200  Gross per 24 hour   Intake 290 ml   Output 200 ml   Net 90 ml       Vitals:    03/11/24 0600   Weight: 79.7 kg (175 lb 11.3 oz)         I/Os    Intake/Output Summary (Last 24 hours) at 3/11/2024 1446  Last data filed at 3/10/2024 2200  Gross per 24 hour   Intake 290 ml   Output 200 ml   Net 90 ml       Labs:   Results from last 72 hours   Lab Units 03/11/24  0652 03/10/24  0544 03/09/24  0555   SODIUM mmol/L 138 137 141   POTASSIUM mmol/L 3.2* 3.3* 2.9*   CHLORIDE mmol/L 100 103 107   CO2 mmol/L 29 26 26   BUN mg/dL 17 12 13   CREATININE mg/dL 0.59 0.68 0.90   GLUCOSE mg/dL 98 101* 86   CALCIUM mg/dL 8.3* 8.2* 8.1*   ANION GAP mmol/L 12 11 11   EGFR mL/min/1.73m*2 >90 >90 >90      Results from last 72 hours   Lab Units 03/11/24  0652 03/10/24  0544 03/09/24  0555   WBC AUTO x10*3/uL 11.3 14.5* 12.2*   HEMOGLOBIN g/dL 11.6* 11.7* 11.6*   HEMATOCRIT % 35.2* 35.5* 35.3*   PLATELETS AUTO x10*3/uL 228 201 211      Lab Results   Component Value Date    CALCIUM 8.3 (L) 03/11/2024    PHOS 3.7 03/08/2024     "  No results found for: \"CRP\"     Micro/ID:   Susceptibility data from last 90 days.  Collected Specimen Info Organism   03/07/24 Fluid from Tracheal Aspirate Normal throat emelia                    No lab exists for component: \"AGALPCRNB\"   .ID  Lab Results   Component Value Date    BLOODCULT No growth at 3 days 03/08/2024    BLOODCULT No growth at 3 days 03/08/2024       Images:  ECG 12 lead  Normal sinus rhythm  Low voltage QRS  Cannot rule out Anterior infarct (cited on or before 07-MAR-2024)  Abnormal ECG  When compared with ECG of 07-MAR-2024 14:41, (unconfirmed)  Sinus rhythm has replaced Atrial fibrillation  Vent. rate has decreased  BPM  Serial changes of evolving Anterior infarct Present  Confirmed by Omar Patino ([id number]) on 3/10/2024 3:29:16 PM       Meds    Scheduled medications  dextromethorphan-guaifenesin, 1 tablet, oral, q12h  [Held by provider] enoxaparin, 40 mg, subcutaneous, q24h  insulin lispro, 0-5 Units, subcutaneous, TID with meals  ipratropium-albuteroL, 3 mL, nebulization, 4x daily  levETIRAcetam, 500 mg, oral, BID  melatonin, 5 mg, oral, Nightly  multivitamin with minerals, 1 tablet, oral, Daily  pantoprazole, 40 mg, oral, Daily before breakfast  rosuvastatin, 5 mg, oral, Daily  sennosides, 1 tablet, oral, BID      Continuous medications     PRN medications  PRN medications: albuterol, benzocaine-menthol, benzonatate, dextrose 10 % in water (D10W), dextrose, gabapentin, glucagon, oxyCODONE, oxygen, polyethylene glycol     Assessment and Plan    Mike Franco is a 64 y.o. 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      Acute Medical Issues:     # Acute metabolic encephalopathy (resolved)  # Seizure, new onset  - Seizure & fall precautions  - Neurology following, recommended LP to rule out any infectious process, continuing Keppra and follow-up with neurology as outpatient, also to inform his oncologist regarding the MRI finding as he thinks it might change the way of his treatment  - EEG pending  - Continue Keppra 500 mg BID   - IR consulted for LP today 3/11     # Small cell lung CA with bone mets  # Immunocompromised  - receiving active chemo/immuno therapy  - Oncologist felt terminal diagnosis after looking at MRI, hospice consulted     # Hyperglycemia  - Lispro sliding scale     # Constipation   - Senokot 8.6 mg PO BID  - Miralax PRN     Resolved Medical Issues:     # CAP  # Possible HAP  - Pt had recent hospital admission around 01/11/24 for 3 days   - s/p zosyn in ED   - Negative MRSA, influenza, COVID  -stopped vanc IV  - Procal 0.13, infectious pna less likely  - Tracheal aspirate Cx: no PMNs, moderate gram positive and negative bacteria, possible contamination  - Stopped ceftriaxzone (03/07- 03/09), finished azithromycin (3/7 - 3/9)    #A-fib with RVR, new onset  - Tachycardic over 200 bpm in ED  - s/p cardioversion x3 unsuccessfully in ED  - s/p amiodarone drip in ED     # Hypotension  - continue fluid assessment and replete as needed    # Inability to protect airway 2/2 seizure   - S/p intubated and placed on ventilator in ED  - Extubated 3/8  - on 3L oxygen NC, wean as tolerated   - continue home albuterol inhaler    # Elevated lactate (resolved)  # Metabolic acidosis (resolved)    Chronic Medical Issues:     #HLD  - Crestor 5 mg daily    # Phantom pain for right AKA  - hold home roxicodone 5 mg PO Q6H PRN     # Peripheral neuropathy  - home gabapentin 100 mg PO nightly PRN     # Weakness  - PT/OT consulted     Fluids: PRN bolus   Electrolytes: replete as needed  Nutrition: Regular   GI  PPX: Protonix   DVT PPX: Lovenox (holding)     Access: PIVs  Antibiotics: None   Oxygenation: 3L NC     Dispo: Admitted to ICU for seizure requiring intubation for airway protection, transferred to floor. Neuro following, planning on LP today.      Isha Clark DO

## 2024-03-11 NOTE — PROGRESS NOTES
03/11/24 1135   Discharge Planning   Living Arrangements Spouse/significant other   Support Systems Spouse/significant other;Friends/neighbors;Family members   Assistance Needed Patient is A&Ox3, independent with ADL's and uses a WC and cane for ambulation at home, room air at baseline. Patients significant other states patient has recently become weaker and having trouble caring for himself independently.   Type of Residence Private residence   Number of Stairs to Enter Residence 0  (WC ramp)   Number of Stairs Within Residence 0   Who is requesting discharge planning? Provider   Home or Post Acute Services In home services   Type of Home Care Services Home nursing visits;Home OT;Home PT   Patient expects to be discharged to: Home with new  HHC   Does the patient need discharge transport arranged? No     3/11/24 at 1141: Spoke with patient and patient's farrah Argueta regarding PT/OT recommendation of HHC at discharge. Patient is agreeable to HHC and choosing to utilize  HHC. Patient states PCP is Dr. Real Nichols. [facility] called PCP office to verify, they state patient hasn't been seen by Dr. Nichols yet. Provider made aware that patient will need internal HHC referral and a referral for [facility] for a provider in program to follow patient for HHC until able to be seen by PCP. [facility] will follow.     3/11/24 at 1518: Call received from nurse at [facility]. Consult placed for hospice. [facility] will follow.

## 2024-03-11 NOTE — TELEPHONE ENCOUNTER
Ellie, the patient's significant other called in to update the team regarding his admission to [facility].

## 2024-03-11 NOTE — SIGNIFICANT EVENT
Mike Franco is a 64 y.o. 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   Pt presented to the ED 3/4 for cough and SOB that has lasted one month, and imaging concerning for worsening lung cancer with questionable areas of pneumonitis.    Patient seen on the floors, has an NC at 3L. He is stable per vitals and not in any obvious distress    Plan  - Continue management as already established

## 2024-03-11 NOTE — PROGRESS NOTES
"Subjective   Doing ok this morning. States he is exhausted. On the regular floor now. Had EEG this morning. States he is having some headache when he coughs, which is a newer thing for him.      Objective   Neurological Exam  Mental Status  Awake and alert. Oriented to person, place, time and situation. Language is fluent with no aphasia. Attention and concentration are normal.    Cranial Nerves  CN II: Visual acuity is normal. Visual fields full to confrontation.  CN III, IV, VI: EOM intact except for R eye which does not abduct.  CN V: Facial sensation is normal.  CN VII: Full and symmetric facial movement.  CN VIII: Hearing is normal.  CN IX, X: Palate elevates symmetrically. Normal gag reflex.  CN XI: Shoulder shrug strength is normal.  CN XII: Tongue midline without atrophy or fasciculations.    Motor  Normal muscle bulk throughout. No fasciculations present. Normal muscle tone. No abnormal involuntary movements.  R leg AKA. .    Sensory  Sensation is intact to light touch, pinprick, vibration and proprioception in all four extremities.    Coordination  Right: Finger-to-nose normal. Rapid alternating movement normal.Left: Finger-to-nose normal. Rapid alternating movement normal.    Gait    Deferred due to AKA.          Last Recorded Vitals  Blood pressure 116/71, pulse 89, temperature 37.2 °C (99 °F), temperature source Temporal, resp. rate 16, height 1.828 m (5' 11.97\"), weight 79.7 kg (175 lb 11.3 oz), SpO2 94 %.         Current Facility-Administered Medications:     albuterol 2.5 mg /3 mL (0.083 %) nebulizer solution 2.5 mg, 2.5 mg, nebulization, q2h PRN, Aline Hunter MD, 2.5 mg at 03/08/24 2227    benzocaine-menthol (Cepastat Sore Throat) 15-3.6 mg lozenge 1 lozenge, 1 lozenge, Mouth/Throat, q2h PRN, Aline Hunter MD, 1 lozenge at 03/08/24 2246    benzonatate (Tessalon) capsule 100 mg, 100 mg, oral, TID PRN, Aline Hunter MD    dextromethorphan-guaifenesin (Mucinex DM)  mg per 12 hr tablet 1 tablet, " 1 tablet, oral, q12h, Aline Hunter MD, 1 tablet at 03/10/24 2105    dextrose 10 % in water (D10W) infusion, 0.3 g/kg/hr, intravenous, Once PRN, Aline Hunter MD    dextrose 50 % injection 25 g, 25 g, intravenous, q15 min PRN, Aline Hunter MD    [Held by provider] enoxaparin (Lovenox) syringe 40 mg, 40 mg, subcutaneous, q24h, Aline Hunter MD, 40 mg at 03/10/24 1723    gabapentin (Neurontin) capsule 100 mg, 100 mg, oral, Nightly PRN, Aline Hunter MD, 100 mg at 03/10/24 2113    glucagon (Glucagen) injection 1 mg, 1 mg, intramuscular, q15 min PRN, Aline Hunter MD    insulin lispro (HumaLOG) injection 0-5 Units, 0-5 Units, subcutaneous, TID with meals, Aline Hunter MD    ipratropium-albuteroL (Duo-Neb) 0.5-2.5 mg/3 mL nebulizer solution 3 mL, 3 mL, nebulization, 4x daily, Aline Hunter MD, 3 mL at 03/11/24 0758    levETIRAcetam (Keppra) tablet 500 mg, 500 mg, oral, BID, Aline Hunter MD, 500 mg at 03/11/24 0832    melatonin tablet 5 mg, 5 mg, oral, Nightly, Aline Hunter MD, 5 mg at 03/10/24 2105    multivitamin with minerals 1 tablet, 1 tablet, oral, Daily, Aline Hunter MD, 1 tablet at 03/11/24 0831    oxyCODONE (Roxicodone) immediate release tablet 5 mg, 5 mg, oral, q6h PRN, Aline Hunter MD, 5 mg at 03/08/24 2247    oxygen (O2) therapy, , inhalation, Continuous PRN - O2/gases, Aline Hunter MD, 3 L/min at 03/10/24 2030    pantoprazole (ProtoNix) EC tablet 40 mg, 40 mg, oral, Daily before breakfast, 40 mg at 03/10/24 0842 **OR** [DISCONTINUED] pantoprazole (ProtoNix) injection 40 mg, 40 mg, intravenous, Daily before breakfast, Maricel Quinones DO, 40 mg at 03/08/24 0600    polyethylene glycol (Glycolax, Miralax) packet 17 g, 17 g, oral, Daily PRN, Aline Hunter MD    rosuvastatin (Crestor) tablet 5 mg, 5 mg, oral, Daily, Aline Hunter MD, 5 mg at 03/11/24 0832    sennosides (Senokot) tablet 8.6 mg, 1 tablet, oral, BID, Aline Hunter MD, 8.6 mg at 03/11/24 0833  Results for orders placed or performed during the  hospital encounter of 03/07/24 (from the past 24 hour(s))   POCT GLUCOSE   Result Value Ref Range    POCT Glucose 97 74 - 99 mg/dL   POCT GLUCOSE   Result Value Ref Range    POCT Glucose 99 74 - 99 mg/dL   CBC   Result Value Ref Range    WBC 11.3 4.4 - 11.3 x10*3/uL    nRBC 0.0 0.0 - 0.0 /100 WBCs    RBC 3.77 (L) 4.50 - 5.90 x10*6/uL    Hemoglobin 11.6 (L) 13.5 - 17.5 g/dL    Hematocrit 35.2 (L) 41.0 - 52.0 %    MCV 93 80 - 100 fL    MCH 30.8 26.0 - 34.0 pg    MCHC 33.0 32.0 - 36.0 g/dL    RDW 15.1 (H) 11.5 - 14.5 %    Platelets 228 150 - 450 x10*3/uL   Comprehensive metabolic panel   Result Value Ref Range    Glucose 98 74 - 99 mg/dL    Sodium 138 136 - 145 mmol/L    Potassium 3.2 (L) 3.5 - 5.3 mmol/L    Chloride 100 98 - 107 mmol/L    Bicarbonate 29 21 - 32 mmol/L    Anion Gap 12 10 - 20 mmol/L    Urea Nitrogen 17 6 - 23 mg/dL    Creatinine 0.59 0.50 - 1.30 mg/dL    eGFR >90 >60 mL/min/1.73m*2    Calcium 8.3 (L) 8.6 - 10.3 mg/dL    Albumin 3.0 (L) 3.4 - 5.0 g/dL    Alkaline Phosphatase 46 33 - 136 U/L    Total Protein 5.7 (L) 6.4 - 8.2 g/dL    AST 16 9 - 39 U/L    Bilirubin, Total 0.6 0.0 - 1.2 mg/dL    ALT 15 10 - 52 U/L   Magnesium   Result Value Ref Range    Magnesium 1.67 1.60 - 2.40 mg/dL   POCT GLUCOSE   Result Value Ref Range    POCT Glucose 107 (H) 74 - 99 mg/dL   Calcium, ionized   Result Value Ref Range    POCT Calcium, Ionized 1.16 1.1 - 1.33 mmol/L     MR brain w and wo IV contrast   Final Result   Diffuse leptomeningeal enhancement principally overlying the   cerebellum appearing since the prior examination suggesting   leptomeningeal neoplastic although meningitis for instance could have   a similar appearance in the appropriate clinical context. Please   correlate clinically.        MACRO:   Critical Finding:  See findings. Notification was initiated on   3/9/2024 at 2:19 pm by  Harry Damon.  (**-OCF-**)        Signed by: Harry Damon 3/9/2024 2:19 PM   Dictation workstation:   [id number]      XR chest  1 view   Final Result   1.  New enteric tube courses inferior to the diaphragm, with the tip   coiling in the left upper quadrant, in the expected location of the   gastric body. Some advancement of the endotracheal tube since prior   imaging, tip of which overlies the carlyle by 1.1 cm. Slight   retraction can be considered.   2. Asymmetric diminished volume of the left lung compared to the   contralateral right side, with persistent small left-sided pleural   effusion and associated atelectatic changes. No new consolidation or   pleural effusion is present in the right lung.                  MACRO:   None        Signed by: Bhargav Malloy 3/7/2024 8:10 PM   Dictation workstation:   [id number]      CT angio chest for pulmonary embolism   Final Result   1. No pulmonary embolism.   2. History of lung carcinoma with a single large lobular mass or   conglomeration of masses in the left hilum extending into the left   upper lobe invading the mediastinum. There is associated mediastinal   adenopathy. There is adenopathy in the left lower neck. There is   occlusion of the proximal left upper lobe mainstem bronchus. There is   narrowing of the proximal segmental arteries to the left upper lobe.   3. There is moderate elevation left hemidiaphragm.   4. Bibasilar atelectasis.   5. Endotracheal tube in satisfactory position        MACRO:   None        Signed by: Mili Arias 3/7/2024 4:10 PM   Dictation workstation:   [id number]      CT head wo IV contrast   Final Result   No acute intracranial finding. MRI with and without IV contrast may   be obtained if clinical suspicion for intracranial metastasis is high.        Signed by: Tyree Llamas 3/7/2024 3:49 PM   Dictation workstation:   [id number]      XR chest 1 view   Final Result   1. Status post intubation with the tip of the tube just above the   clavicles as above.   2. Elevation left hemidiaphragm with left perihilar and basilar   atelectasis and/or  infiltrate.        MACRO:   none        Signed by: Darek German 3/7/2024 3:16 PM   Dictation workstation:   [id number]      Consult to Interventional Radiology    (Results Pending)       Assessment/Plan   Principal Problem:    Seizure (CMS/HCC)    Pt presented with suspected seizure like activity. There is not a great description of the event that took place, but based on wife's description and post ictal state in the ED with improvement towards baseline, elevated lactate (though no repeat available). Pt certainly has seizure risk factors, especially now in light of MRI findings below, that would justify continuing Keppra.      -MRI brain w and wo contrast shows diffuse leptomeningeal enhancement primarily over the cerebellum which is new since his prior MRI brain. 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 No cerebellar findings on exam this morning, does have lack of R eye abduction.   - rEEG done this morning, pending read    - LP with cell count and diff, glucose, protein, cytology, autoimmune encephalopathy panel. CSF culture, pending IR. Failed at bedside in ICU.   - Continue Keppra 500 mg BID      Educated patient that they should not drive, operate heavy machinery, work around sharp objects or open flames, work on high surfaces, or swim or bath alone, or any other activity where they would be a risk to himself or others if a seizure were to occur for at least 6 months and until cleared by a neurologist.         Continue medical management, neurology will follow  Discussed with neurology attending, Dr. Herbert

## 2024-03-11 NOTE — CARE PLAN
The patient's goals for the shift include jessie    The clinical goals for the shift include Patient will remain safe during shift.    Over the shift, the patient did not make progress toward the following goals. Barriers to progression include . Recommendations to address these barriers include .

## 2024-03-12 ENCOUNTER — APPOINTMENT (OUTPATIENT)
Dept: RADIOLOGY | Facility: CLINIC | Age: 64
End: 2024-03-12
Payer: COMMERCIAL

## 2024-03-12 ENCOUNTER — APPOINTMENT (OUTPATIENT)
Dept: RADIOLOGY | Facility: HOSPITAL | Age: 64
DRG: 100 | End: 2024-03-12
Payer: COMMERCIAL

## 2024-03-12 LAB
ALBUMIN SERPL BCP-MCNC: 3.2 G/DL (ref 3.4–5)
ALP SERPL-CCNC: 53 U/L (ref 33–136)
ALT SERPL W P-5'-P-CCNC: 18 U/L (ref 10–52)
ANION GAP SERPL CALC-SCNC: 13 MMOL/L (ref 10–20)
APPEARANCE CSF: ABNORMAL
AST SERPL W P-5'-P-CCNC: 16 U/L (ref 9–39)
BACTERIA BLD CULT: NORMAL
BACTERIA BLD CULT: NORMAL
BASOPHILS NFR CSF MANUAL: 0 %
BILIRUB SERPL-MCNC: 0.5 MG/DL (ref 0–1.2)
BLASTS CSF MANUAL: 0 %
BUN SERPL-MCNC: 19 MG/DL (ref 6–23)
CALCIUM SERPL-MCNC: 8.8 MG/DL (ref 8.6–10.3)
CHLORIDE SERPL-SCNC: 100 MMOL/L (ref 98–107)
CO2 SERPL-SCNC: 30 MMOL/L (ref 21–32)
COLOR CSF: COLORLESS
COLOR SPUN CSF: COLORLESS
CREAT SERPL-MCNC: 0.63 MG/DL (ref 0.5–1.3)
EGFRCR SERPLBLD CKD-EPI 2021: >90 ML/MIN/1.73M*2
EOSINOPHIL NFR CSF MANUAL: 0 %
ERYTHROCYTE [DISTWIDTH] IN BLOOD BY AUTOMATED COUNT: 15.3 % (ref 11.5–14.5)
GLUCOSE BLD MANUAL STRIP-MCNC: 122 MG/DL (ref 74–99)
GLUCOSE BLD MANUAL STRIP-MCNC: 125 MG/DL (ref 74–99)
GLUCOSE BLD MANUAL STRIP-MCNC: 92 MG/DL (ref 74–99)
GLUCOSE BLD MANUAL STRIP-MCNC: 92 MG/DL (ref 74–99)
GLUCOSE CSF-MCNC: 15 MG/DL (ref 40–70)
GLUCOSE SERPL-MCNC: 97 MG/DL (ref 74–99)
HCT VFR BLD AUTO: 39.7 % (ref 41–52)
HGB BLD-MCNC: 12.9 G/DL (ref 13.5–17.5)
IMM GRANULOCYTES NFR CSF: 0 %
LYMPHOCYTES NFR CSF MANUAL: 35 % (ref 28–96)
MAGNESIUM SERPL-MCNC: 1.71 MG/DL (ref 1.6–2.4)
MCH RBC QN AUTO: 30.8 PG (ref 26–34)
MCHC RBC AUTO-ENTMCNC: 32.5 G/DL (ref 32–36)
MCV RBC AUTO: 95 FL (ref 80–100)
MONOS+MACROS NFR CSF MANUAL: 61 % (ref 16–56)
NEUTS SEG NFR CSF MANUAL: 2 % (ref 0–5)
NRBC BLD-RTO: 0 /100 WBCS (ref 0–0)
OTHER CELLS NFR CSF MANUAL: 2 %
PHOSPHATE SERPL-MCNC: 3.6 MG/DL (ref 2.5–4.9)
PLASMA CELLS NFR CSF MICRO: 0 %
PLATELET # BLD AUTO: 286 X10*3/UL (ref 150–450)
POTASSIUM SERPL-SCNC: 3.3 MMOL/L (ref 3.5–5.3)
PROT CSF-MCNC: 213 MG/DL (ref 15–45)
PROT SERPL-MCNC: 6.3 G/DL (ref 6.4–8.2)
RBC # BLD AUTO: 4.19 X10*6/UL (ref 4.5–5.9)
RBC # CSF AUTO: 4 /UL (ref 0–5)
SODIUM SERPL-SCNC: 140 MMOL/L (ref 136–145)
TOTAL CELLS COUNTED CSF: 100
TUBE # CSF: ABNORMAL
WBC # BLD AUTO: 10.8 X10*3/UL (ref 4.4–11.3)
WBC # CSF AUTO: 81 /UL (ref 1–5)

## 2024-03-12 PROCEDURE — 99232 SBSQ HOSP IP/OBS MODERATE 35: CPT | Performed by: STUDENT IN AN ORGANIZED HEALTH CARE EDUCATION/TRAINING PROGRAM

## 2024-03-12 PROCEDURE — 80053 COMPREHEN METABOLIC PANEL: CPT

## 2024-03-12 PROCEDURE — 2500000001 HC RX 250 WO HCPCS SELF ADMINISTERED DRUGS (ALT 637 FOR MEDICARE OP)

## 2024-03-12 PROCEDURE — 82945 GLUCOSE OTHER FLUID: CPT

## 2024-03-12 PROCEDURE — 99223 1ST HOSP IP/OBS HIGH 75: CPT

## 2024-03-12 PROCEDURE — 86255 FLUORESCENT ANTIBODY SCREEN: CPT

## 2024-03-12 PROCEDURE — 88185 FLOWCYTOMETRY/TC ADD-ON: CPT | Mod: TC,GEALAB

## 2024-03-12 PROCEDURE — 99231 SBSQ HOSP IP/OBS SF/LOW 25: CPT

## 2024-03-12 PROCEDURE — 88104 CYTOPATH FL NONGYN SMEARS: CPT | Performed by: PATHOLOGY

## 2024-03-12 PROCEDURE — 94668 MNPJ CHEST WALL SBSQ: CPT

## 2024-03-12 PROCEDURE — 84157 ASSAY OF PROTEIN OTHER: CPT

## 2024-03-12 PROCEDURE — 82947 ASSAY GLUCOSE BLOOD QUANT: CPT

## 2024-03-12 PROCEDURE — 85027 COMPLETE CBC AUTOMATED: CPT

## 2024-03-12 PROCEDURE — 36415 COLL VENOUS BLD VENIPUNCTURE: CPT

## 2024-03-12 PROCEDURE — 84100 ASSAY OF PHOSPHORUS: CPT

## 2024-03-12 PROCEDURE — 62328 DX LMBR SPI PNXR W/FLUOR/CT: CPT

## 2024-03-12 PROCEDURE — 89051 BODY FLUID CELL COUNT: CPT

## 2024-03-12 PROCEDURE — 97530 THERAPEUTIC ACTIVITIES: CPT | Mod: GO

## 2024-03-12 PROCEDURE — 2500000005 HC RX 250 GENERAL PHARMACY W/O HCPCS

## 2024-03-12 PROCEDURE — 99232 SBSQ HOSP IP/OBS MODERATE 35: CPT | Performed by: INTERNAL MEDICINE

## 2024-03-12 PROCEDURE — 2500000002 HC RX 250 W HCPCS SELF ADMINISTERED DRUGS (ALT 637 FOR MEDICARE OP, ALT 636 FOR OP/ED)

## 2024-03-12 PROCEDURE — 83735 ASSAY OF MAGNESIUM: CPT

## 2024-03-12 PROCEDURE — 88187 FLOWCYTOMETRY/READ 2-8: CPT | Performed by: PATHOLOGY

## 2024-03-12 PROCEDURE — 2500000004 HC RX 250 GENERAL PHARMACY W/ HCPCS (ALT 636 FOR OP/ED)

## 2024-03-12 PROCEDURE — 87070 CULTURE OTHR SPECIMN AEROBIC: CPT | Mod: GEALAB

## 2024-03-12 PROCEDURE — 1200000002 HC GENERAL ROOM WITH TELEMETRY DAILY

## 2024-03-12 PROCEDURE — 94640 AIRWAY INHALATION TREATMENT: CPT

## 2024-03-12 PROCEDURE — 62328 DX LMBR SPI PNXR W/FLUOR/CT: CPT | Performed by: RADIOLOGY

## 2024-03-12 RX ORDER — LANOLIN ALCOHOL/MO/W.PET/CERES
400 CREAM (GRAM) TOPICAL ONCE
Status: COMPLETED | OUTPATIENT
Start: 2024-03-12 | End: 2024-03-12

## 2024-03-12 RX ORDER — POTASSIUM CHLORIDE 1.5 G/1.58G
40 POWDER, FOR SOLUTION ORAL ONCE
Status: COMPLETED | OUTPATIENT
Start: 2024-03-12 | End: 2024-03-12

## 2024-03-12 RX ORDER — DEXAMETHASONE 2 MG/1
2 TABLET ORAL EVERY 12 HOURS SCHEDULED
Status: DISCONTINUED | OUTPATIENT
Start: 2024-03-12 | End: 2024-03-14 | Stop reason: HOSPADM

## 2024-03-12 RX ADMIN — Medication 3 L/MIN: at 20:00

## 2024-03-12 RX ADMIN — Medication 1 TABLET: at 09:38

## 2024-03-12 RX ADMIN — DEXAMETHASONE 2 MG: 2 TABLET ORAL at 14:10

## 2024-03-12 RX ADMIN — Medication 400 MG: at 18:32

## 2024-03-12 RX ADMIN — IPRATROPIUM BROMIDE AND ALBUTEROL SULFATE 3 ML: 2.5; .5 SOLUTION RESPIRATORY (INHALATION) at 20:00

## 2024-03-12 RX ADMIN — GUAIFENESIN, DEXTROMETHORPHAN HBR 1 TABLET: 600; 30 TABLET ORAL at 20:38

## 2024-03-12 RX ADMIN — Medication 5 MG: at 20:38

## 2024-03-12 RX ADMIN — SENNOSIDES 8.6 MG: 8.6 TABLET, FILM COATED ORAL at 09:38

## 2024-03-12 RX ADMIN — LEVETIRACETAM 500 MG: 500 TABLET, FILM COATED ORAL at 09:39

## 2024-03-12 RX ADMIN — POTASSIUM CHLORIDE 40 MEQ: 1.5 POWDER, FOR SOLUTION ORAL at 18:32

## 2024-03-12 RX ADMIN — LEVETIRACETAM 500 MG: 500 TABLET, FILM COATED ORAL at 20:38

## 2024-03-12 RX ADMIN — IPRATROPIUM BROMIDE AND ALBUTEROL SULFATE 3 ML: 2.5; .5 SOLUTION RESPIRATORY (INHALATION) at 11:56

## 2024-03-12 RX ADMIN — PANTOPRAZOLE SODIUM 40 MG: 40 TABLET, DELAYED RELEASE ORAL at 09:39

## 2024-03-12 RX ADMIN — Medication 3 L/MIN: at 08:17

## 2024-03-12 RX ADMIN — ROSUVASTATIN CALCIUM 5 MG: 10 TABLET, FILM COATED ORAL at 09:39

## 2024-03-12 RX ADMIN — DEXAMETHASONE 2 MG: 2 TABLET ORAL at 20:38

## 2024-03-12 RX ADMIN — GUAIFENESIN, DEXTROMETHORPHAN HBR 1 TABLET: 600; 30 TABLET ORAL at 09:39

## 2024-03-12 RX ADMIN — IPRATROPIUM BROMIDE AND ALBUTEROL SULFATE 3 ML: 2.5; .5 SOLUTION RESPIRATORY (INHALATION) at 08:17

## 2024-03-12 RX ADMIN — SENNOSIDES 8.6 MG: 8.6 TABLET, FILM COATED ORAL at 20:37

## 2024-03-12 ASSESSMENT — COGNITIVE AND FUNCTIONAL STATUS - GENERAL
TURNING FROM BACK TO SIDE WHILE IN FLAT BAD: A LITTLE
DRESSING REGULAR UPPER BODY CLOTHING: A LITTLE
MOBILITY SCORE: 15
DRESSING REGULAR LOWER BODY CLOTHING: A LITTLE
TOILETING: A LITTLE
MOVING FROM LYING ON BACK TO SITTING ON SIDE OF FLAT BED WITH BEDRAILS: A LITTLE
STANDING UP FROM CHAIR USING ARMS: A LITTLE
CLIMB 3 TO 5 STEPS WITH RAILING: TOTAL
DAILY ACTIVITIY SCORE: 21
DRESSING REGULAR LOWER BODY CLOTHING: A LITTLE
TOILETING: A LITTLE
WALKING IN HOSPITAL ROOM: A LOT
DAILY ACTIVITIY SCORE: 20
MOVING TO AND FROM BED TO CHAIR: A LITTLE
HELP NEEDED FOR BATHING: A LITTLE
HELP NEEDED FOR BATHING: A LITTLE

## 2024-03-12 ASSESSMENT — PAIN SCALES - GENERAL
PAINLEVEL_OUTOF10: 0 - NO PAIN

## 2024-03-12 ASSESSMENT — VISUAL ACUITY: VA_NORMAL: 1

## 2024-03-12 ASSESSMENT — PAIN - FUNCTIONAL ASSESSMENT: PAIN_FUNCTIONAL_ASSESSMENT: 0-10

## 2024-03-12 NOTE — PROGRESS NOTES
"Subjective   Doing well this morning. Having breakfast. States he slept a little better last night.      Objective   Neurological Exam  Mental Status  Awake and alert. Oriented to person, place, time and situation. Language is fluent with no aphasia. Attention and concentration are normal.    Cranial Nerves  CN II: Visual acuity is normal. Visual fields full to confrontation.  CN III, IV, VI: EOM intact except for R eye which does not abduct.  CN V: Facial sensation is normal.  CN VII: Full and symmetric facial movement.  CN VIII: Hearing is normal.  CN IX, X: Palate elevates symmetrically. Normal gag reflex.  CN XI: Shoulder shrug strength is normal.  CN XII: Tongue midline without atrophy or fasciculations.    Motor  Normal muscle bulk throughout. No fasciculations present. Normal muscle tone. No abnormal involuntary movements.  R leg AKA. .    Sensory  Sensation is intact to light touch, pinprick, vibration and proprioception in all four extremities.    Coordination  Right: Finger-to-nose normal. Rapid alternating movement normal.Left: Finger-to-nose normal. Rapid alternating movement normal.    Gait    Deferred due to AKA.          Last Recorded Vitals  Blood pressure 113/65, pulse 94, temperature 36.8 °C (98.2 °F), temperature source Temporal, resp. rate 20, height 1.828 m (5' 11.97\"), weight 80.6 kg (177 lb 12.8 oz), SpO2 94 %.         Current Facility-Administered Medications:     albuterol 2.5 mg /3 mL (0.083 %) nebulizer solution 2.5 mg, 2.5 mg, nebulization, q2h PRN, Aline Hunter MD, 2.5 mg at 03/08/24 2227    benzocaine-menthol (Cepastat Sore Throat) 15-3.6 mg lozenge 1 lozenge, 1 lozenge, Mouth/Throat, q2h PRN, Aline Hunter MD, 1 lozenge at 03/08/24 2246    benzonatate (Tessalon) capsule 100 mg, 100 mg, oral, TID PRN, Aline Hunter MD    dextromethorphan-guaifenesin (Mucinex DM)  mg per 12 hr tablet 1 tablet, 1 tablet, oral, q12h, Aline Hunter MD, 1 tablet at 03/11/24 2108    dextrose 10 % in " water (D10W) infusion, 0.3 g/kg/hr, intravenous, Once PRN, Aline Hunter MD    dextrose 50 % injection 25 g, 25 g, intravenous, q15 min PRN, Aline Hunter MD    [Held by provider] enoxaparin (Lovenox) syringe 40 mg, 40 mg, subcutaneous, q24h, Aline Hunter MD, 40 mg at 03/10/24 1723    gabapentin (Neurontin) capsule 100 mg, 100 mg, oral, Nightly PRN, Aline Hunter MD, 100 mg at 03/10/24 2113    glucagon (Glucagen) injection 1 mg, 1 mg, intramuscular, q15 min PRN, Aline Hunter MD    insulin lispro (HumaLOG) injection 0-5 Units, 0-5 Units, subcutaneous, TID with meals, Aline Hunter MD    ipratropium-albuteroL (Duo-Neb) 0.5-2.5 mg/3 mL nebulizer solution 3 mL, 3 mL, nebulization, 4x daily, Aline Hunter MD, 3 mL at 03/12/24 0817    levETIRAcetam (Keppra) tablet 500 mg, 500 mg, oral, BID, Aline Hunter MD, 500 mg at 03/11/24 2104    melatonin tablet 5 mg, 5 mg, oral, Nightly, Aline Hunter MD, 5 mg at 03/11/24 2105    multivitamin with minerals 1 tablet, 1 tablet, oral, Daily, Aline Hunter MD, 1 tablet at 03/11/24 0831    oxyCODONE (Roxicodone) immediate release tablet 5 mg, 5 mg, oral, q6h PRN, Aline Hunter MD, 5 mg at 03/08/24 2247    oxygen (O2) therapy, , inhalation, Continuous PRN - O2/gases, Aline Hunter MD, 3 L/min at 03/12/24 0817    pantoprazole (ProtoNix) EC tablet 40 mg, 40 mg, oral, Daily before breakfast, 40 mg at 03/10/24 0842 **OR** [DISCONTINUED] pantoprazole (ProtoNix) injection 40 mg, 40 mg, intravenous, Daily before breakfast, Maricel Quinones DO, 40 mg at 03/08/24 0600    polyethylene glycol (Glycolax, Miralax) packet 17 g, 17 g, oral, Daily PRN, Aline Hunter MD    rosuvastatin (Crestor) tablet 5 mg, 5 mg, oral, Daily, Aline Hunter MD, 5 mg at 03/11/24 0832    sennosides (Senokot) tablet 8.6 mg, 1 tablet, oral, BID, Aline Hunter MD, 8.6 mg at 03/11/24 2101  Results for orders placed or performed during the hospital encounter of 03/07/24 (from the past 24 hour(s))   Calcium, ionized   Result  Value Ref Range    POCT Calcium, Ionized 1.16 1.1 - 1.33 mmol/L   POCT GLUCOSE   Result Value Ref Range    POCT Glucose 105 (H) 74 - 99 mg/dL   POCT GLUCOSE   Result Value Ref Range    POCT Glucose 118 (H) 74 - 99 mg/dL   POCT GLUCOSE   Result Value Ref Range    POCT Glucose 104 (H) 74 - 99 mg/dL   CBC   Result Value Ref Range    WBC 10.8 4.4 - 11.3 x10*3/uL    nRBC 0.0 0.0 - 0.0 /100 WBCs    RBC 4.19 (L) 4.50 - 5.90 x10*6/uL    Hemoglobin 12.9 (L) 13.5 - 17.5 g/dL    Hematocrit 39.7 (L) 41.0 - 52.0 %    MCV 95 80 - 100 fL    MCH 30.8 26.0 - 34.0 pg    MCHC 32.5 32.0 - 36.0 g/dL    RDW 15.3 (H) 11.5 - 14.5 %    Platelets 286 150 - 450 x10*3/uL   Comprehensive metabolic panel   Result Value Ref Range    Glucose 97 74 - 99 mg/dL    Sodium 140 136 - 145 mmol/L    Potassium 3.3 (L) 3.5 - 5.3 mmol/L    Chloride 100 98 - 107 mmol/L    Bicarbonate 30 21 - 32 mmol/L    Anion Gap 13 10 - 20 mmol/L    Urea Nitrogen 19 6 - 23 mg/dL    Creatinine 0.63 0.50 - 1.30 mg/dL    eGFR >90 >60 mL/min/1.73m*2    Calcium 8.8 8.6 - 10.3 mg/dL    Albumin 3.2 (L) 3.4 - 5.0 g/dL    Alkaline Phosphatase 53 33 - 136 U/L    Total Protein 6.3 (L) 6.4 - 8.2 g/dL    AST 16 9 - 39 U/L    Bilirubin, Total 0.5 0.0 - 1.2 mg/dL    ALT 18 10 - 52 U/L   Magnesium   Result Value Ref Range    Magnesium 1.71 1.60 - 2.40 mg/dL   Phosphorus   Result Value Ref Range    Phosphorus 3.6 2.5 - 4.9 mg/dL   POCT GLUCOSE   Result Value Ref Range    POCT Glucose 92 74 - 99 mg/dL     MR brain w and wo IV contrast   Final Result   Diffuse leptomeningeal enhancement principally overlying the   cerebellum appearing since the prior examination suggesting   leptomeningeal neoplastic although meningitis for instance could have   a similar appearance in the appropriate clinical context. Please   correlate clinically.        MACRO:   Critical Finding:  See findings. Notification was initiated on   3/9/2024 at 2:19 pm by  Harry Damon.  (**-OCF-**)        Signed by: Harry Damon  3/9/2024 2:19 PM   Dictation workstation:   [id number]      XR chest 1 view   Final Result   1.  New enteric tube courses inferior to the diaphragm, with the tip   coiling in the left upper quadrant, in the expected location of the   gastric body. Some advancement of the endotracheal tube since prior   imaging, tip of which overlies the carlyle by 1.1 cm. Slight   retraction can be considered.   2. Asymmetric diminished volume of the left lung compared to the   contralateral right side, with persistent small left-sided pleural   effusion and associated atelectatic changes. No new consolidation or   pleural effusion is present in the right lung.                  MACRO:   None        Signed by: Bhargav Malloy 3/7/2024 8:10 PM   Dictation workstation:   [id number]      CT angio chest for pulmonary embolism   Final Result   1. No pulmonary embolism.   2. History of lung carcinoma with a single large lobular mass or   conglomeration of masses in the left hilum extending into the left   upper lobe invading the mediastinum. There is associated mediastinal   adenopathy. There is adenopathy in the left lower neck. There is   occlusion of the proximal left upper lobe mainstem bronchus. There is   narrowing of the proximal segmental arteries to the left upper lobe.   3. There is moderate elevation left hemidiaphragm.   4. Bibasilar atelectasis.   5. Endotracheal tube in satisfactory position        MACRO:   None        Signed by: Mili Arias 3/7/2024 4:10 PM   Dictation workstation:   [id number]      CT head wo IV contrast   Final Result   No acute intracranial finding. MRI with and without IV contrast may   be obtained if clinical suspicion for intracranial metastasis is high.        Signed by: Tyree Llamas 3/7/2024 3:49 PM   Dictation workstation:   [id number]      XR chest 1 view   Final Result   1. Status post intubation with the tip of the tube just above the   clavicles as above.   2. Elevation left  hemidiaphragm with left perihilar and basilar   atelectasis and/or infiltrate.        MACRO:   none        Signed by: Darek German 3/7/2024 3:16 PM   Dictation workstation:   [id number]      FL guided lumbar puncture    (Results Pending)       Assessment/Plan   Principal Problem:    Seizure (CMS/HCC)      -MRI brain w and wo contrast shows diffuse leptomeningeal enhancement primarily over the cerebellum which is new since his prior MRI brain. 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 No cerebellar findings on exam this morning, does have lack of R eye abduction.   - rEEG normal 3/11/24  - LP with cell count and diff, glucose, protein, cytology, autoimmune encephalopathy panel, csf culture, pending IR. Failed at bedside in ICU.   - Continue Keppra 500 mg BID   - Recommend ongoing discussions w his oncologist regarding MRI and pending LP as it may change treatment plan. May consider palliative as well.    - educated on seizure precautions         Continue medical management, neurology will follow  Discussed with neurology attending, Dr. Herbert

## 2024-03-12 NOTE — CARE PLAN
Palliative Care Social Work Note    [facility]  See palliative care NP note for details.

## 2024-03-12 NOTE — CONSULTS
PALLIATIVE MEDICINE CONSULT  CURRENT ATTENDING PROVIDER: José Miguel Woods,      Reason For Consult  Assistance with goals of care, complex medical decision making, code status discussion, hospice discussion    History Of Present Illness  Mike Franco is a 64 y.o. 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 Patient was intubated for airway protection and admitted to the ICU. Patient is currently on the floor on 3L NC. Palliative Medicine was consulted for goals of care and complex medical decision making.     History obtained from chart review including ED note, H&P, patient's daily progress notes, review of lab/test results, and discussion with primary team and bedside RN.    Introduction to Palliative Care  Met with patient at bedside. Attempted to call patient's farrah Argueta to participate in ACP meeting but only a home number on was listed on file and patient unsure of cell phone number. Patient said Ellie would be visiting later today and he would have her give us her cell phone number to place on file. Messaged received from bedside RN that Ellie arrived to bedside some time after initial meeting with patient. Author returned to give update on goals of care discussion to Ellie who was in agreement with plan going forward. Patient alert and oriented, has capacity to make their own medical decisions at this time. Palliative Medicine was introduced as a specialty service for patients with serious illness to help with symptom management, improve quality of life, assist with goals of care conversations, navigate complex  decision making, and provide support to patients and families. Support and empathy was provided throughout the encounter.    ------------------------------------------------------------------------  Advanced Care Planning  Patient and family consented to a voluntary Advanced Care Planning meeting.     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     Disease Specific Information Provided/Prognosis Discussed: Patient's current clinical condition, including diagnosis, guarded prognosis, and management plan were discussed. Ellie sharing that patient's oncologist had updated her and patient on MRI results and patient's poor prognosis with the recommendation for patient to pursue hospice services going forward. Empathy provided in setting of new diagnosis/prognosis.     Understanding/Overall Impression: Patient and Ellie understanding of overall health status and severity of illness.     Hospice Discussion/Eligibility: Counseling provided on the benefit of Hospice Services in the setting of patient's metastatic lung cancer to support patient and family by providing counseling, aggressive symptom management,  prioritizing comfort and quality of life, alleviation of suffering, and allowing patient to pass with comfort and dignity. Patient and Ellie expressing they would like to proceed with meeting with [facility] to care for patient going forward.     Goals/Hopes: Patient expressing goals are clear for improved comfort, quality of life, and alleviation of suffering for the time patient has left. Plan to consult [facility] per their request.     Minimal Acceptable Quality of Life/Maximal [place] Tolerable for the Possibility of More Time: Counseling provided on the concept of MAO/Maximal [place]. Patient expressing that they would never want to be in a health state where they were dependent upon any artifical  life support such as a ventilator. Patient deems that this would not be an acceptable quality of life for the patient.     Resuscitation Assessment: Counseling provided on the benefit versus burden of CPR in the setting of patient's overall health status. Patient also expressing he does not want to endure CPR or be placed on a ventilator. Ellie states she is in agreement to support patient in his medical wishes.     Health Preferences and Priorities with Disease Progression:   Ellie is now aware of patients wishes to not pursue CPR or elective intubation as disease progresses to a terminal state.     Advanced Directives: patient has a HPOA (Ellie) and living will at home. Ellie to bring in copies to be placed on file.     All questions and concerns were addressed during encounter.   -------------------------------------------------------------------------------------------    Past Medical History  He has a past medical history of Cough variant asthma (11/14/2017), Hyperlipidemia, Lung cancer (CMS/[facility]), and Personal history of other specified conditions (12/08/2022).     Allergies  Etoposide and Fosaprepitant    Surgical History  He has a past surgical history that includes Leg amputation (Right); Hernia repair; and IR intervention filter placement.     Social History  He reports that he quit smoking about 2 years ago. His smoking use included cigarettes. He has never used smokeless tobacco. He reports that he does not currently use alcohol. He reports that he does not use drugs.      Family History  Family History   Problem Relation Name Age of Onset    Cancer Mother         Medication History   Current Outpatient Medications   Medication Instructions    albuterol 2.5 mg /3 mL (0.083 %) nebulizer solution Take 3 mL by nebulization every 6 hours if needed (For respiratory rate GREATER THAN OR EQUAL TO 28 breaths/minute and/or wheezing.)    albuterol 90 mcg/actuation inhaler 2 puffs, inhalation, Every 6 hours PRN     "ALPRAZolam (XANAX) 0.25 mg, oral, 3 times daily PRN    dextromethorphan-guaifenesin (Mucinex DM)  mg 12 hr tablet 1 tablet, oral, Every 12 hours, Do not crush, chew, or split.    doxycycline (ADOXA) 100 mg, oral, 2 times daily, Take with a full glass of water and do not lie down for at least 30 minutes after    EPINEPHrine (EPIPEN) 0.3 mg, intramuscular, As Directed    gabapentin (Neurontin) 100 mg capsule 1 capsule, oral, Nightly PRN    multivitamin tablet 1 tablet, oral, Daily    OLANZapine (ZYPREXA) 5 mg, oral, Nightly, For 4 days starting the evening of treatment    ondansetron (ZOFRAN) 8 mg, oral, Every 8 hours PRN    oxyCODONE (ROXICODONE) 5 mg, oral, Every 6 hours PRN    pantoprazole (ProtoNix) 40 mg EC tablet Do not crush, chew, or split. Take it 30 min before breakfast daily    prochlorperazine (COMPAZINE) 10 mg, oral, Every 6 hours PRN    rosuvastatin (Crestor) 5 mg tablet 1 tablet, oral, Daily     Scheduled medications   dexAMETHasone, 2 mg, oral, q12h VALERIA  dextromethorphan-guaifenesin, 1 tablet, oral, q12h  [Held by provider] enoxaparin, 40 mg, subcutaneous, q24h  insulin lispro, 0-5 Units, subcutaneous, TID with meals  ipratropium-albuteroL, 3 mL, nebulization, 4x daily  levETIRAcetam, 500 mg, oral, BID  magnesium oxide, 400 mg, oral, Once  melatonin, 5 mg, oral, Nightly  multivitamin with minerals, 1 tablet, oral, Daily  pantoprazole, 40 mg, oral, Daily before breakfast  potassium chloride, 40 mEq, oral, Once  rosuvastatin, 5 mg, oral, Daily  sennosides, 1 tablet, oral, BID      Continuous medications     PRN medications  PRN medications: albuterol, benzocaine-menthol, benzonatate, dextrose 10 % in water (D10W), dextrose, gabapentin, glucagon, oxyCODONE, oxygen, polyethylene glycol     Last Recorded Vitals  Blood pressure 128/74, pulse 88, temperature 36.6 °C (97.9 °F), temperature source Temporal, resp. rate 18, height 1.828 m (5' 11.97\"), weight 80.6 kg (177 lb 12.8 oz), SpO2 92 %.  7 Day " Weight Change: Unable to Calculate   Body mass index is 24.13 kg/m².     Relevant Results  Lab Results   Component Value Date    WBC 10.8 03/12/2024    HGB 12.9 (L) 03/12/2024    HCT 39.7 (L) 03/12/2024    MCV 95 03/12/2024     03/12/2024      Lab Results   Component Value Date    GLUCOSE 97 03/12/2024    CALCIUM 8.8 03/12/2024     03/12/2024    K 3.3 (L) 03/12/2024    CO2 30 03/12/2024     03/12/2024    BUN 19 03/12/2024    CREATININE 0.63 03/12/2024      Lab Results   Component Value Date    ALT 18 03/12/2024    AST 16 03/12/2024    ALKPHOS 53 03/12/2024    BILITOT 0.5 03/12/2024      Lab Results   Component Value Date    ALBUMIN 3.2 (L) 03/12/2024     Serum creatinine: 0.63 mg/dL 03/12/24 0642  Estimated creatinine clearance: 125 mL/min     Relevant Imaging  FL guided lumbar puncture  Narrative: Interpreted By:  Sneha Villa,   STUDY:  FL GUIDED LUMBAR PUNCTURE;  3/12/2024 3:43 pm      INDICATION:  Signs/Symptoms:lumber puncture.      COMPARISON:  None.      ACCESSION NUMBER(S):  [id number]      ORDERING CLINICIAN:  RALPH LAMBERT      TECHNIQUE:  Interventional radiologist, Sneha Villa MD.      After discussion of risks, benefits, and alternatives, oral and  written informed consent was obtained. The patient was placed in  prone position on exam table. The L3-4 interspace was then marked  under fluoroscopy. The patient was then prepped and draped in sterile  fashion. Local anesthesia was achieved with 2% Lidocaine solution. A  22 gauge spinal needle was then introduced at the L3-L4 level under  direct fluoroscopic guidance until return of CSF was demonstrated. 21  ml of clear CSF was collected in 4 tubes. The stylet was then  replaced and the spinal needle was removed.  Hemostasis was achieved  with direct pressure and the area was dressed with a Band-Aid. The  patient tolerated procedure well without any immediate or clinically  apparent complications. Total fluoroscopy time was 7  seconds.      The collected CSF was then sent to the lab for appropriate lab tests  as ordered by the referring physician.      FINDINGS:  A single periprocedural spot fluoroscopic image was provided for  interpretation. Image quality is such that fine bony detail is  obscured. A needle is seen to overlie the posterior elements of L4.      Impression: Successful fluoroscopic guided lumbar puncture.      MACRO:  None      Signed by: Sneha Villa 3/12/2024 4:04 PM  Dictation workstation:   [id number]      Physical Exam:   Physical Exam  Constitutional:       General: He is not in acute distress.     Appearance: He is ill-appearing.   HENT:      Head: Normocephalic and atraumatic.   Eyes:      Conjunctiva/sclera: Conjunctivae normal.   Pulmonary:      Effort: Pulmonary effort is normal.   Skin:     General: Skin is warm and dry.   Neurological:      Mental Status: He is oriented to person, place, and time.   Psychiatric:         Thought Content: Thought content normal.       Assessment and Plan  - Continue supportive care through primary team  - Oncology, Pulmonology, and Neurology consulted, appreciate recommendations    #Goals of Care:  #Complex Medical Decision Making:  #Advanced Care Planning:  - goals are comfort and quality of life based: consult placed to [facility] per patient and Ellie request  - code status changed to DNR/DNI per patient's wishes, Ellie in agreement to support patient's decision and honor his medical wishes going forward  - state DNR form completed and placed in patient's chart, copy given to patient and Ellie   - Copies requested of Advanced Directives to be placed on file     Supportive Interventions: Music Therapy: referral placed     Signature and billing  Medical complexity was high level due to complexity of problems including metastatic lung cancer with leptomeningeal carcinomatosis posing threat to life or function, extensive data review, interviewing  patient/family, discussion with primary team and bedside RN, coordination of care, and high risk of management/treatment including patient's decision to change code status to DNR/DNI.     Plan of Care discussed with: Updated MD Eduardo Torres and bedside RN on goals of care discussion, hospice referral, and code status change to DNR/DNI via epic secure chat and face-to-face encounter.     Thank you for asking Palliative Care to assist with care of this patient.  We will continue to follow  Please contact us for additional questions or concerns.    SIGNATURE: KATIE Wilburn  PAGER/CONTACT:  Contact information:  Palliative Medicine  Contact Via Epic Secure chat

## 2024-03-12 NOTE — PROGRESS NOTES
"Mike Franco is a 64 y.o. male on day 4 of admission presenting with Seizure (CMS/HCC).    Subjective   Feels ok today.       Objective     Physical Exam  Vitals reviewed.   Constitutional:       Appearance: He is ill-appearing.   HENT:      Head: Normocephalic and atraumatic.   Eyes:      Extraocular Movements: Extraocular movements intact.   Cardiovascular:      Rate and Rhythm: Normal rate and regular rhythm.      Heart sounds: Normal heart sounds.   Pulmonary:      Comments: Coarse breath sounds bilaterally   Abdominal:      Palpations: Abdomen is soft.      Tenderness: There is no abdominal tenderness.   Musculoskeletal:      Cervical back: Normal range of motion.   Skin:     General: Skin is warm.   Neurological:      General: No focal deficit present.      Mental Status: He is alert and oriented to person, place, and time. Mental status is at baseline.   Psychiatric:         Mood and Affect: Mood normal.         Behavior: Behavior normal.         Last Recorded Vitals  Blood pressure 128/72, pulse 91, temperature 37.1 °C (98.8 °F), temperature source Temporal, resp. rate 20, height 1.828 m (5' 11.97\"), weight 79.7 kg (175 lb 11.3 oz), SpO2 91 %.  Intake/Output last 3 Shifts:  I/O last 3 completed shifts:  In: 440 (5.5 mL/kg) [P.O.:440]  Out: 475 (6 mL/kg) [Urine:475 (0.2 mL/kg/hr)]  Weight: 79.7 kg     Relevant Results      Scheduled medications  dextromethorphan-guaifenesin, 1 tablet, oral, q12h  [Held by provider] enoxaparin, 40 mg, subcutaneous, q24h  insulin lispro, 0-5 Units, subcutaneous, TID with meals  ipratropium-albuteroL, 3 mL, nebulization, 4x daily  levETIRAcetam, 500 mg, oral, BID  melatonin, 5 mg, oral, Nightly  multivitamin with minerals, 1 tablet, oral, Daily  pantoprazole, 40 mg, oral, Daily before breakfast  rosuvastatin, 5 mg, oral, Daily  sennosides, 1 tablet, oral, BID      Continuous medications     PRN medications  PRN medications: albuterol, benzocaine-menthol, benzonatate, dextrose " 10 % in water (D10W), dextrose, gabapentin, glucagon, oxyCODONE, oxygen, polyethylene glycol    Results for orders placed or performed during the hospital encounter of 03/07/24 (from the past 24 hour(s))   CBC   Result Value Ref Range    WBC 11.3 4.4 - 11.3 x10*3/uL    nRBC 0.0 0.0 - 0.0 /100 WBCs    RBC 3.77 (L) 4.50 - 5.90 x10*6/uL    Hemoglobin 11.6 (L) 13.5 - 17.5 g/dL    Hematocrit 35.2 (L) 41.0 - 52.0 %    MCV 93 80 - 100 fL    MCH 30.8 26.0 - 34.0 pg    MCHC 33.0 32.0 - 36.0 g/dL    RDW 15.1 (H) 11.5 - 14.5 %    Platelets 228 150 - 450 x10*3/uL   Comprehensive metabolic panel   Result Value Ref Range    Glucose 98 74 - 99 mg/dL    Sodium 138 136 - 145 mmol/L    Potassium 3.2 (L) 3.5 - 5.3 mmol/L    Chloride 100 98 - 107 mmol/L    Bicarbonate 29 21 - 32 mmol/L    Anion Gap 12 10 - 20 mmol/L    Urea Nitrogen 17 6 - 23 mg/dL    Creatinine 0.59 0.50 - 1.30 mg/dL    eGFR >90 >60 mL/min/1.73m*2    Calcium 8.3 (L) 8.6 - 10.3 mg/dL    Albumin 3.0 (L) 3.4 - 5.0 g/dL    Alkaline Phosphatase 46 33 - 136 U/L    Total Protein 5.7 (L) 6.4 - 8.2 g/dL    AST 16 9 - 39 U/L    Bilirubin, Total 0.6 0.0 - 1.2 mg/dL    ALT 15 10 - 52 U/L   Magnesium   Result Value Ref Range    Magnesium 1.67 1.60 - 2.40 mg/dL   POCT GLUCOSE   Result Value Ref Range    POCT Glucose 107 (H) 74 - 99 mg/dL   Calcium, ionized   Result Value Ref Range    POCT Calcium, Ionized 1.16 1.1 - 1.33 mmol/L   POCT GLUCOSE   Result Value Ref Range    POCT Glucose 105 (H) 74 - 99 mg/dL   POCT GLUCOSE   Result Value Ref Range    POCT Glucose 118 (H) 74 - 99 mg/dL   POCT GLUCOSE   Result Value Ref Range    POCT Glucose 104 (H) 74 - 99 mg/dL                  Assessment/Plan   Principal Problem:    Seizure (CMS/HCC)    65 yo man w h/o small cell carcinoma admitted w/ seizure.      Hypoxemia - likely related to his lung Ca since pna was ruled out.  Currently on 3L.  Wean O2 as tolerated.  Cont incentive spirometry.  Will need home O2 eval on discharge.    Kane LEON  MD Rhett

## 2024-03-12 NOTE — PROGRESS NOTES
Patient: Mike Franco Age: 64 y.o.   Gender: male Room/bed: 127/127-A     Attending: José Miguel Woods DO  Code Status:  DNR and No Intubation    Overnight Events     No acute events overnight.     Subjective   Patient seen and examined today, feeling ok overall. Denies any complaints. Planning to meet with hospice.     Objective    Constitutional: Appears stated age. In NAD.   CV: RRR, No M/R/G  PULM: CTAB, no coughing or wheezing  ABDOMEN: Soft, NT/ND. No TTP. + BSx4.  SKIN: Normal Color, Warm, Dry, No Rashes   EXTREMITIES: R AKA, non-tender, no LE edema   NEURO: A&O x 3, nonfocal neurological exam.  PSYCH: Normal Mood & Behavior    Temp:  [36.3 °C (97.3 °F)-37.1 °C (98.8 °F)] 36.6 °C (97.9 °F)  Heart Rate:  [] 97  Resp:  [20] 20  BP: (113-128)/(65-78) 114/65  FiO2 (%):  [32 %-36 %] 32 %      Intake/Output Summary (Last 24 hours) at 3/12/2024 1512  Last data filed at 3/11/2024 2300  Gross per 24 hour   Intake --   Output 350 ml   Net -350 ml       Vitals:    03/12/24 0600   Weight: 80.6 kg (177 lb 12.8 oz)       FiO2 (%):  [32 %-36 %] 32 %     I/Os    Intake/Output Summary (Last 24 hours) at 3/12/2024 1512  Last data filed at 3/11/2024 2300  Gross per 24 hour   Intake --   Output 350 ml   Net -350 ml       Labs:   Results from last 72 hours   Lab Units 03/12/24  0642 03/11/24  0652 03/10/24  0544   SODIUM mmol/L 140 138 137   POTASSIUM mmol/L 3.3* 3.2* 3.3*   CHLORIDE mmol/L 100 100 103   CO2 mmol/L 30 29 26   BUN mg/dL 19 17 12   CREATININE mg/dL 0.63 0.59 0.68   GLUCOSE mg/dL 97 98 101*   CALCIUM mg/dL 8.8 8.3* 8.2*   ANION GAP mmol/L 13 12 11   EGFR mL/min/1.73m*2 >90 >90 >90   PHOSPHORUS mg/dL 3.6  --   --       Results from last 72 hours   Lab Units 03/12/24  0642 03/11/24  0652 03/10/24  0544   WBC AUTO x10*3/uL 10.8 11.3 14.5*   HEMOGLOBIN g/dL 12.9* 11.6* 11.7*   HEMATOCRIT % 39.7* 35.2* 35.5*   PLATELETS AUTO x10*3/uL 286 228 201      Lab Results   Component Value Date    CALCIUM 8.8 03/12/2024  "   PHOS 3.6 03/12/2024      No results found for: \"CRP\"     Micro/ID:   Susceptibility data from last 90 days.  Collected Specimen Info Organism   03/07/24 Fluid from Tracheal Aspirate Normal throat emelia                    No lab exists for component: \"AGALPCRNB\"   .ID  Lab Results   Component Value Date    BLOODCULT No growth at 4 days -  FINAL REPORT 03/08/2024    BLOODCULT No growth at 4 days -  FINAL REPORT 03/08/2024       Images:  EEG  IMPRESSION    This routine EEG is normal. No epileptiform discharges or lateralizing signs are seen.    A full report will be scanned into the patient's chart at a later time.    This report has been interpreted and electronically signed by       Meds    Scheduled medications  dexAMETHasone, 2 mg, oral, q12h VALERIA  dextromethorphan-guaifenesin, 1 tablet, oral, q12h  [Held by provider] enoxaparin, 40 mg, subcutaneous, q24h  insulin lispro, 0-5 Units, subcutaneous, TID with meals  ipratropium-albuteroL, 3 mL, nebulization, 4x daily  levETIRAcetam, 500 mg, oral, BID  magnesium oxide, 400 mg, oral, Once  melatonin, 5 mg, oral, Nightly  multivitamin with minerals, 1 tablet, oral, Daily  pantoprazole, 40 mg, oral, Daily before breakfast  potassium chloride, 40 mEq, oral, Once  rosuvastatin, 5 mg, oral, Daily  sennosides, 1 tablet, oral, BID      Continuous medications     PRN medications  PRN medications: albuterol, benzocaine-menthol, benzonatate, dextrose 10 % in water (D10W), dextrose, gabapentin, glucagon, oxyCODONE, oxygen, polyethylene glycol     Assessment and Plan    Mike Franco is a 64 y.o. 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      Acute Medical Issues:      # Acute metabolic encephalopathy (resolved)  # Seizure, new onset  - Seizure & fall precautions  - Neurology following, recommended LP to rule out any infectious process, continuing Keppra and follow-up with neurology as outpatient, also to inform his oncologist regarding the MRI finding as he thinks it might change the way of his treatment  - EEG pending  - Continue Keppra 500 mg BID   - IR consulted, plan for LP today      # Small cell lung CA with bone mets  # Immunocompromised  - receiving active chemo/immuno therapy  - Oncologist felt terminal diagnosis after looking at MRI  - Hospice referral sent      # Hyperglycemia  - Lispro sliding scale      # Constipation   - Senokot 8.6 mg PO BID  - Miralax PRN      Resolved Medical Issues:      # CAP  # Possible HAP  - Pt had recent hospital admission around 01/11/24 for 3 days   - s/p zosyn in ED   - Negative MRSA, influenza, COVID  -stopped vanc IV  - Procal 0.13, infectious pna less likely  - Tracheal aspirate Cx: no PMNs, moderate gram positive and negative bacteria, possible contamination  - Stopped ceftriaxzone (03/07- 03/09), finished azithromycin (3/7 - 3/9)     #A-fib with RVR, new onset  - Tachycardic over 200 bpm in ED  - s/p cardioversion x3 unsuccessfully in ED  - s/p amiodarone drip in ED     # Hypotension  - continue fluid assessment and replete as needed     # Inability to protect airway 2/2 seizure   - S/p intubated and placed on ventilator in ED  - Extubated 3/8  - on 3L oxygen NC, wean as tolerated   - continue home albuterol inhaler     # Elevated lactate (resolved)  # Metabolic acidosis (resolved)     Chronic Medical Issues:      #HLD  - Crestor 5 mg daily     # Phantom pain for right AKA  - hold home roxicodone 5 mg PO Q6H PRN     # Peripheral neuropathy  - home gabapentin 100 mg PO nightly PRN     # Weakness  - PT/OT consulted      Fluids: PRN bolus   Electrolytes:  replete as needed  Nutrition: Regular   GI PPX: Protonix   DVT PPX: Lovenox (holding)      Access: PIVs  Antibiotics: None   Oxygenation: 3L NC      Dispo: Admitted to ICU for seizure requiring intubation for airway protection, transferred to floor. Neuro following, planning on LP today. Hospice referral sent.          Isha Clark DO

## 2024-03-12 NOTE — PROGRESS NOTES
Occupational Therapy    Occupational Therapy Treatment    Name: Mike Franco  MRN: [id number]  : 1960  Date: 24  Time Calculation  Start Time: 1042  Stop Time: 1101  Time Calculation (min): 19 min    Assessment:  OT Assessment: Pt is making good progress. Pt to continue to benefit from skilled OT services to increase independence with ADL's and transfers  Prognosis: Good  Barriers to Discharge: None  Evaluation/Treatment Tolerance: Patient tolerated treatment well  Medical Staff Made Aware: Yes  End of Session Communication: Bedside nurse  End of Session Patient Position: Up in chair, Alarm on (RN notified of pt needing a new green alarm box)  Plan:  Treatment Interventions: ADL retraining, UE strengthening/ROM, Functional transfer training, Endurance training  OT Frequency: 2 times per week  OT Discharge Recommendations: Low intensity level of continued care  OT Recommended Transfer Status:  (CGA)  OT - OK to Discharge: Yes (per OT POC)    Subjective   Previous Visit Info:  OT Last Visit  OT Received On: 24  General:  General  Reason for Referral: 65 yo male admitted for seizure and change in mental status  Past Medical History Relevant to Rehab: metastatic lung CA, CAD, DM, HLD, R AKA with phantom limb pain, IVC filter.  Family/Caregiver Present: No  Prior to Session Communication: Bedside nurse  Patient Position Received: Bed, 3 rail up, Alarm off, not on at start of session  General Comment: Pt agreeable to OT treatment.  Precautions:  Medical Precautions: Fall precautions, Seizure precautions (3 L of oxygen, telemetry)  Precautions Comment: R AKA     Pain Assessment:  Pain Assessment  Pain Assessment: 0-10  Pain Score: 0 - No pain     Objective   Activities of Daily Living: LE Dressing  LE Dressing: Yes  Adult Briefs Level of Assistance: Minimum assistance  LE Dressing Where Assessed: Edge of bed  LE Dressing Comments: Pt required assist to start brief over L foot since pt typically uses a  reacher to assist him at home. Pt able to pull brief up in  standing with FWW at [facility] for standing balance.     Bed Mobility/Transfers: Bed Mobility  Bed Mobility: Yes  Bed Mobility 1  Bed Mobility 1: Supine to sitting  Level of Assistance 1: Close supervision  Bed Mobility Comments 1: use of bedrail    Transfers  Transfer: Yes  Transfer 1  Transfer From 1: Sit to, Stand to  Transfer to 1: Sit, Stand  Technique 1: Sit to stand, Stand to sit  Transfer Device 1: Walker  Transfer Level of Assistance 1: Contact guard, Minimal verbal cues  Trials/Comments 1: verbal cues for hand placement  Transfers 2  Transfer From 2: Bed to  Transfer to 2: Chair with arms  Technique 2: Stand pivot  Transfer Level of Assistance 2: Contact guard    Sitting Balance:  Static Sitting Balance  Static Sitting-Balance Support: No upper extremity supported (L foot supported)  Static Sitting-Level of Assistance: Independent  Dynamic Sitting Balance  Dynamic Sitting-Balance Support: Feet unsupported, No upper extremity supported  Dynamic Sitting-Balance: Lateral lean  Dynamic Sitting-Comments: SBA  Standing Balance:  Static Standing Balance  Static Standing-Balance Support: Bilateral upper extremity supported  Static Standing-Level of Assistance: Contact guard  Dynamic Standing Balance  Dynamic Standing-Balance Support: Bilateral upper extremity supported  Dynamic Standing-Comments: CGA      Outcome Measures:  [facility] Daily Activity  Putting on and taking off regular lower body clothing: A little  Bathing (including washing, rinsing, drying): A little  Putting on and taking off regular upper body clothing: None  Toileting, which includes using toilet, bedpan or urinal: A little  Taking care of personal grooming such as brushing teeth: None  Eating Meals: None  Daily Activity - Total Score: 21        Education Documentation  No documentation found.  Education Comments  No comments found.      Goals:  Encounter Problems       Encounter Problems  (Active)       OT Problem       PATIENT tolerate 15 minutes of activity without SOB or rest break to demonstrate improved endurance.  (Progressing)       Start:  03/10/24    Expected End:  03/31/24            PATIENT WILL DEMONSTRATE INDEPENDENCE WITH LOWER BODY donning and doffing all LE clothes  (Progressing)       Start:  03/10/24    Expected End:  03/31/24            PATIENT WILL PERFORM CHAIR TO AND FROM BED TRANSFER WITH supervision (Progressing)       Start:  03/10/24    Expected End:  03/31/24            PATIENT WILL PERFORM SUPINE TO SIT ON BED WITH supervision DEMONSTRATING CONTROL (Progressing)       Start:  03/10/24    Expected End:  03/31/24            PATIENT WILL PERFORM SIT TO SUPINE ON BED WITH supervision DEMONSTRATING CONTROL (Progressing)       Start:  03/10/24    Expected End:  03/31/24            PATIENT WILL PERFORM BATHING ACTIVITIES SEATED WITH set up  (Progressing)       Start:  03/10/24    Expected End:  03/31/24

## 2024-03-13 ENCOUNTER — APPOINTMENT (OUTPATIENT)
Dept: RADIOLOGY | Facility: CLINIC | Age: 64
End: 2024-03-13
Payer: COMMERCIAL

## 2024-03-13 LAB
ALBUMIN SERPL BCP-MCNC: 3.3 G/DL (ref 3.4–5)
ALP SERPL-CCNC: 54 U/L (ref 33–136)
ALT SERPL W P-5'-P-CCNC: 17 U/L (ref 10–52)
ANION GAP SERPL CALC-SCNC: 15 MMOL/L (ref 10–20)
AST SERPL W P-5'-P-CCNC: 15 U/L (ref 9–39)
BILIRUB SERPL-MCNC: 0.4 MG/DL (ref 0–1.2)
BUN SERPL-MCNC: 18 MG/DL (ref 6–23)
CALCIUM SERPL-MCNC: 8.9 MG/DL (ref 8.6–10.3)
CHLORIDE SERPL-SCNC: 101 MMOL/L (ref 98–107)
CO2 SERPL-SCNC: 26 MMOL/L (ref 21–32)
CREAT SERPL-MCNC: 0.55 MG/DL (ref 0.5–1.3)
EGFRCR SERPLBLD CKD-EPI 2021: >90 ML/MIN/1.73M*2
ERYTHROCYTE [DISTWIDTH] IN BLOOD BY AUTOMATED COUNT: 15.1 % (ref 11.5–14.5)
GLUCOSE BLD MANUAL STRIP-MCNC: 102 MG/DL (ref 74–99)
GLUCOSE BLD MANUAL STRIP-MCNC: 175 MG/DL (ref 74–99)
GLUCOSE BLD MANUAL STRIP-MCNC: 96 MG/DL (ref 74–99)
GLUCOSE SERPL-MCNC: 102 MG/DL (ref 74–99)
HCT VFR BLD AUTO: 37.5 % (ref 41–52)
HGB BLD-MCNC: 12.5 G/DL (ref 13.5–17.5)
MAGNESIUM SERPL-MCNC: 1.69 MG/DL (ref 1.6–2.4)
MCH RBC QN AUTO: 31.3 PG (ref 26–34)
MCHC RBC AUTO-ENTMCNC: 33.3 G/DL (ref 32–36)
MCV RBC AUTO: 94 FL (ref 80–100)
NRBC BLD-RTO: 0 /100 WBCS (ref 0–0)
PATH REVIEW-CELL CT,CSF: NORMAL
PHOSPHATE SERPL-MCNC: 3.7 MG/DL (ref 2.5–4.9)
PLATELET # BLD AUTO: 276 X10*3/UL (ref 150–450)
POTASSIUM SERPL-SCNC: 4 MMOL/L (ref 3.5–5.3)
PROT SERPL-MCNC: 6 G/DL (ref 6.4–8.2)
RBC # BLD AUTO: 3.99 X10*6/UL (ref 4.5–5.9)
SODIUM SERPL-SCNC: 138 MMOL/L (ref 136–145)
WBC # BLD AUTO: 12.1 X10*3/UL (ref 4.4–11.3)

## 2024-03-13 PROCEDURE — 2500000005 HC RX 250 GENERAL PHARMACY W/O HCPCS

## 2024-03-13 PROCEDURE — 94760 N-INVAS EAR/PLS OXIMETRY 1: CPT

## 2024-03-13 PROCEDURE — 1200000002 HC GENERAL ROOM WITH TELEMETRY DAILY

## 2024-03-13 PROCEDURE — 94668 MNPJ CHEST WALL SBSQ: CPT

## 2024-03-13 PROCEDURE — 83735 ASSAY OF MAGNESIUM: CPT

## 2024-03-13 PROCEDURE — 80053 COMPREHEN METABOLIC PANEL: CPT

## 2024-03-13 PROCEDURE — 99232 SBSQ HOSP IP/OBS MODERATE 35: CPT | Performed by: STUDENT IN AN ORGANIZED HEALTH CARE EDUCATION/TRAINING PROGRAM

## 2024-03-13 PROCEDURE — 84100 ASSAY OF PHOSPHORUS: CPT

## 2024-03-13 PROCEDURE — 2500000001 HC RX 250 WO HCPCS SELF ADMINISTERED DRUGS (ALT 637 FOR MEDICARE OP)

## 2024-03-13 PROCEDURE — 2500000002 HC RX 250 W HCPCS SELF ADMINISTERED DRUGS (ALT 637 FOR MEDICARE OP, ALT 636 FOR OP/ED): Performed by: STUDENT IN AN ORGANIZED HEALTH CARE EDUCATION/TRAINING PROGRAM

## 2024-03-13 PROCEDURE — 2500000002 HC RX 250 W HCPCS SELF ADMINISTERED DRUGS (ALT 637 FOR MEDICARE OP, ALT 636 FOR OP/ED)

## 2024-03-13 PROCEDURE — 85027 COMPLETE CBC AUTOMATED: CPT

## 2024-03-13 PROCEDURE — 36415 COLL VENOUS BLD VENIPUNCTURE: CPT

## 2024-03-13 PROCEDURE — 2500000004 HC RX 250 GENERAL PHARMACY W/ HCPCS (ALT 636 FOR OP/ED)

## 2024-03-13 PROCEDURE — 94640 AIRWAY INHALATION TREATMENT: CPT

## 2024-03-13 PROCEDURE — 99233 SBSQ HOSP IP/OBS HIGH 50: CPT

## 2024-03-13 PROCEDURE — 82947 ASSAY GLUCOSE BLOOD QUANT: CPT

## 2024-03-13 RX ORDER — ACETAMINOPHEN 325 MG/1
650 TABLET ORAL EVERY 6 HOURS PRN
Status: DISCONTINUED | OUTPATIENT
Start: 2024-03-13 | End: 2024-03-14 | Stop reason: HOSPADM

## 2024-03-13 RX ORDER — IPRATROPIUM BROMIDE AND ALBUTEROL SULFATE 2.5; .5 MG/3ML; MG/3ML
3 SOLUTION RESPIRATORY (INHALATION)
Status: DISCONTINUED | OUTPATIENT
Start: 2024-03-13 | End: 2024-03-14 | Stop reason: HOSPADM

## 2024-03-13 RX ORDER — MIDAZOLAM 5 MG/.1ML
SPRAY NASAL ONCE
Qty: 2 EACH | Refills: 0 | Status: SHIPPED | OUTPATIENT
Start: 2024-03-13 | End: 2024-03-14

## 2024-03-13 RX ADMIN — Medication 5 MG: at 20:25

## 2024-03-13 RX ADMIN — Medication 3 L/MIN: at 08:41

## 2024-03-13 RX ADMIN — SENNOSIDES 8.6 MG: 8.6 TABLET, FILM COATED ORAL at 20:25

## 2024-03-13 RX ADMIN — IPRATROPIUM BROMIDE AND ALBUTEROL SULFATE 3 ML: 2.5; .5 SOLUTION RESPIRATORY (INHALATION) at 20:47

## 2024-03-13 RX ADMIN — DEXAMETHASONE 2 MG: 2 TABLET ORAL at 20:25

## 2024-03-13 RX ADMIN — ROSUVASTATIN CALCIUM 5 MG: 10 TABLET, FILM COATED ORAL at 09:53

## 2024-03-13 RX ADMIN — LEVETIRACETAM 500 MG: 500 TABLET, FILM COATED ORAL at 09:53

## 2024-03-13 RX ADMIN — PANTOPRAZOLE SODIUM 40 MG: 40 TABLET, DELAYED RELEASE ORAL at 09:53

## 2024-03-13 RX ADMIN — ACETAMINOPHEN 650 MG: 325 TABLET ORAL at 15:41

## 2024-03-13 RX ADMIN — Medication 3 L/MIN: at 15:39

## 2024-03-13 RX ADMIN — INSULIN LISPRO 1 UNITS: 100 INJECTION, SOLUTION INTRAVENOUS; SUBCUTANEOUS at 17:00

## 2024-03-13 RX ADMIN — GUAIFENESIN, DEXTROMETHORPHAN HBR 1 TABLET: 600; 30 TABLET ORAL at 09:52

## 2024-03-13 RX ADMIN — GUAIFENESIN, DEXTROMETHORPHAN HBR 1 TABLET: 600; 30 TABLET ORAL at 20:25

## 2024-03-13 RX ADMIN — Medication 1 TABLET: at 09:53

## 2024-03-13 RX ADMIN — ACETAMINOPHEN 650 MG: 325 TABLET ORAL at 09:55

## 2024-03-13 RX ADMIN — ACETAMINOPHEN 650 MG: 325 TABLET ORAL at 20:25

## 2024-03-13 RX ADMIN — ALBUTEROL SULFATE 2.5 MG: 2.5 SOLUTION RESPIRATORY (INHALATION) at 08:41

## 2024-03-13 RX ADMIN — LEVETIRACETAM 500 MG: 500 TABLET, FILM COATED ORAL at 20:25

## 2024-03-13 RX ADMIN — SENNOSIDES 8.6 MG: 8.6 TABLET, FILM COATED ORAL at 09:53

## 2024-03-13 RX ADMIN — IPRATROPIUM BROMIDE AND ALBUTEROL SULFATE 3 ML: 2.5; .5 SOLUTION RESPIRATORY (INHALATION) at 15:35

## 2024-03-13 RX ADMIN — DEXAMETHASONE 2 MG: 2 TABLET ORAL at 09:52

## 2024-03-13 ASSESSMENT — COGNITIVE AND FUNCTIONAL STATUS - GENERAL
CLIMB 3 TO 5 STEPS WITH RAILING: TOTAL
HELP NEEDED FOR BATHING: A LITTLE
TURNING FROM BACK TO SIDE WHILE IN FLAT BAD: A LITTLE
TOILETING: A LITTLE
TOILETING: A LITTLE
MOVING TO AND FROM BED TO CHAIR: A LITTLE
STANDING UP FROM CHAIR USING ARMS: A LITTLE
DAILY ACTIVITIY SCORE: 20
WALKING IN HOSPITAL ROOM: A LOT
DRESSING REGULAR UPPER BODY CLOTHING: A LITTLE
DRESSING REGULAR LOWER BODY CLOTHING: A LITTLE
DRESSING REGULAR UPPER BODY CLOTHING: A LITTLE
DAILY ACTIVITIY SCORE: 20
DRESSING REGULAR LOWER BODY CLOTHING: A LITTLE
MOVING FROM LYING ON BACK TO SITTING ON SIDE OF FLAT BED WITH BEDRAILS: A LITTLE
STANDING UP FROM CHAIR USING ARMS: A LITTLE
WALKING IN HOSPITAL ROOM: A LOT
MOVING TO AND FROM BED TO CHAIR: A LITTLE
MOBILITY SCORE: 15
MOVING FROM LYING ON BACK TO SITTING ON SIDE OF FLAT BED WITH BEDRAILS: A LITTLE
TURNING FROM BACK TO SIDE WHILE IN FLAT BAD: A LITTLE
CLIMB 3 TO 5 STEPS WITH RAILING: TOTAL
MOBILITY SCORE: 15
HELP NEEDED FOR BATHING: A LITTLE

## 2024-03-13 ASSESSMENT — PAIN SCALES - GENERAL
PAINLEVEL_OUTOF10: 0 - NO PAIN
PAINLEVEL_OUTOF10: 3

## 2024-03-13 ASSESSMENT — PAIN DESCRIPTION - LOCATION: LOCATION: HEAD

## 2024-03-13 NOTE — PROGRESS NOTES
"Patient: Mike Franco Age: 64 y.o.   Gender: male Room/bed: 127/127-A     Attending: José Miguel Woods DO  Code Status:  DNR and No Intubation    Overnight Events     No acute events overnight     Subjective   Patient seen and examined, feeling tired with a mild headache. Denies any other complaints.     Objective    Constitutional: Appears stated age. In NAD.   CV: RRR, No M/R/G  PULM: CTAB, no coughing or wheezing  ABDOMEN: Soft, NT/ND. No TTP. + BSx4.  SKIN: Normal Color, Warm, Dry, No Rashes   EXTREMITIES: R AKA, non-tender, no LE edema   NEURO: A&O x 3, nonfocal neurological exam.  PSYCH: Normal Mood & Behavior       Temp:  [36.2 °C (97.2 °F)-36.5 °C (97.7 °F)] 36.4 °C (97.5 °F)  Heart Rate:  [] 108  Resp:  [16-18] 16  BP: ()/(63-81) 120/67      Intake/Output Summary (Last 24 hours) at 3/13/2024 1508  Last data filed at 3/13/2024 1456  Gross per 24 hour   Intake 120 ml   Output 850 ml   Net -730 ml       Vitals:    03/13/24 0600   Weight: 77.1 kg (170 lb)           I/Os    Intake/Output Summary (Last 24 hours) at 3/13/2024 1508  Last data filed at 3/13/2024 1456  Gross per 24 hour   Intake 120 ml   Output 850 ml   Net -730 ml       Labs:   Results from last 72 hours   Lab Units 03/13/24  0704 03/12/24  0642 03/11/24  0652   SODIUM mmol/L 138 140 138   POTASSIUM mmol/L 4.0 3.3* 3.2*   CHLORIDE mmol/L 101 100 100   CO2 mmol/L 26 30 29   BUN mg/dL 18 19 17   CREATININE mg/dL 0.55 0.63 0.59   GLUCOSE mg/dL 102* 97 98   CALCIUM mg/dL 8.9 8.8 8.3*   ANION GAP mmol/L 15 13 12   EGFR mL/min/1.73m*2 >90 >90 >90   PHOSPHORUS mg/dL 3.7 3.6  --       Results from last 72 hours   Lab Units 03/13/24  0704 03/12/24  0642 03/11/24  0652   WBC AUTO x10*3/uL 12.1* 10.8 11.3   HEMOGLOBIN g/dL 12.5* 12.9* 11.6*   HEMATOCRIT % 37.5* 39.7* 35.2*   PLATELETS AUTO x10*3/uL 276 286 228      Lab Results   Component Value Date    CALCIUM 8.9 03/13/2024    PHOS 3.7 03/13/2024      No results found for: \"CRP\"     Micro/ID: " "  Susceptibility data from last 90 days.  Collected Specimen Info Organism   03/07/24 Fluid from Tracheal Aspirate Normal throat emelia                    No lab exists for component: \"AGALPCRNB\"   .ID  Lab Results   Component Value Date    BLOODCULT No growth at 4 days -  FINAL REPORT 03/08/2024    BLOODCULT No growth at 4 days -  FINAL REPORT 03/08/2024    CSFCULTSMEAR No growth to date 03/12/2024       Images:  FL guided lumbar puncture  Narrative: Interpreted By:  Sneha Villa,   STUDY:  FL GUIDED LUMBAR PUNCTURE;  3/12/2024 3:43 pm      INDICATION:  Signs/Symptoms:lumber puncture.      COMPARISON:  None.      ACCESSION NUMBER(S):  [id number]      ORDERING CLINICIAN:  RALPH LAMBERT      TECHNIQUE:  Interventional radiologist, Sneha Villa MD.      After discussion of risks, benefits, and alternatives, oral and  written informed consent was obtained. The patient was placed in  prone position on exam table. The L3-4 interspace was then marked  under fluoroscopy. The patient was then prepped and draped in sterile  fashion. Local anesthesia was achieved with 2% Lidocaine solution. A  22 gauge spinal needle was then introduced at the L3-L4 level under  direct fluoroscopic guidance until return of CSF was demonstrated. 21  ml of clear CSF was collected in 4 tubes. The stylet was then  replaced and the spinal needle was removed.  Hemostasis was achieved  with direct pressure and the area was dressed with a Band-Aid. The  patient tolerated procedure well without any immediate or clinically  apparent complications. Total fluoroscopy time was 7 seconds.      The collected CSF was then sent to the lab for appropriate lab tests  as ordered by the referring physician.      FINDINGS:  A single periprocedural spot fluoroscopic image was provided for  interpretation. Image quality is such that fine bony detail is  obscured. A needle is seen to overlie the posterior elements of L4.      Impression: Successful fluoroscopic " guided lumbar puncture.      MACRO:  None      Signed by: Sneha Villa 3/12/2024 4:04 PM  Dictation workstation:   [id number]       Meds    Scheduled medications  dexAMETHasone, 2 mg, oral, q12h VALERIA  dextromethorphan-guaifenesin, 1 tablet, oral, q12h  [Held by provider] enoxaparin, 40 mg, subcutaneous, q24h  insulin lispro, 0-5 Units, subcutaneous, TID with meals  ipratropium-albuteroL, 3 mL, nebulization, TID  levETIRAcetam, 500 mg, oral, BID  melatonin, 5 mg, oral, Nightly  multivitamin with minerals, 1 tablet, oral, Daily  pantoprazole, 40 mg, oral, Daily before breakfast  rosuvastatin, 5 mg, oral, Daily  sennosides, 1 tablet, oral, BID      Continuous medications     PRN medications  PRN medications: acetaminophen, albuterol, benzocaine-menthol, benzonatate, dextrose 10 % in water (D10W), dextrose, gabapentin, glucagon, oxyCODONE, oxygen, polyethylene glycol     Assessment and Plan    Mike Franco is a 64 y.o. 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      Acute Medical Issues:      # Acute metabolic encephalopathy (resolved)  # Seizure, new onset  - Seizure & fall precautions  - Neurology following, recommended LP to rule out any infectious process, continuing Keppra and follow-up with neurology as outpatient, also to inform his oncologist regarding the MRI finding as he thinks it might change the way of his treatment  - EEG pending  - Continue Keppra 500 mg BID   - LP results suggestive of bacterial infection     # Small cell lung CA with bone mets  # Immunocompromised  - receiving active chemo/immuno therapy  -  Oncologist felt terminal diagnosis after looking at MRI  - Planning for DC home with hospice tomorrow    # Hyperglycemia  - Lispro sliding scale      # Constipation   - Senokot 8.6 mg PO BID  - Miralax PRN      Resolved Medical Issues:      # CAP  # Possible HAP  - Pt had recent hospital admission around 01/11/24 for 3 days   - s/p zosyn in ED   - Negative MRSA, influenza, COVID  -stopped vanc IV  - Procal 0.13, infectious pna less likely  - Tracheal aspirate Cx: no PMNs, moderate gram positive and negative bacteria, possible contamination  - Stopped ceftriaxzone (03/07- 03/09), finished azithromycin (3/7 - 3/9)     #A-fib with RVR, new onset  - Tachycardic over 200 bpm in ED  - s/p cardioversion x3 unsuccessfully in ED  - s/p amiodarone drip in ED     # Hypotension  - continue fluid assessment and replete as needed     # Inability to protect airway 2/2 seizure   - S/p intubated and placed on ventilator in ED  - Extubated 3/8  - on 3L oxygen NC, wean as tolerated   - continue home albuterol inhaler     # Elevated lactate (resolved)  # Metabolic acidosis (resolved)     Chronic Medical Issues:      #HLD  - Crestor 5 mg daily     # Phantom pain for right AKA  - hold home roxicodone 5 mg PO Q6H PRN     # Peripheral neuropathy  - home gabapentin 100 mg PO nightly PRN     # Weakness  - PT/OT consulted      Fluids: PRN bolus   Electrolytes: replete as needed  Nutrition: Regular   GI PPX: Protonix   DVT PPX: Lovenox (holding)      Access: PIVs  Antibiotics: None   Oxygenation: 3L NC      Dispo: Admitted to ICU for seizure requiring intubation for airway protection, transferred to floor. Planning to DC home with hospice tomorrow.     Isha Clark DO

## 2024-03-13 NOTE — PROGRESS NOTES
"Mike Franco is a 64 y.o. male on day 5 of admission presenting with Seizure (CMS/HCC).    Subjective   Feels ok today.       Objective     Physical Exam  Vitals reviewed.   Constitutional:       Appearance: He is ill-appearing.   HENT:      Head: Normocephalic and atraumatic.   Eyes:      Extraocular Movements: Extraocular movements intact.   Cardiovascular:      Rate and Rhythm: Normal rate and regular rhythm.      Heart sounds: Normal heart sounds.   Pulmonary:      Comments: Coarse breath sounds bilaterally   Abdominal:      Palpations: Abdomen is soft.      Tenderness: There is no abdominal tenderness.   Musculoskeletal:      Cervical back: Normal range of motion.   Skin:     General: Skin is warm.   Neurological:      General: No focal deficit present.      Mental Status: He is alert and oriented to person, place, and time. Mental status is at baseline.   Psychiatric:         Mood and Affect: Mood normal.         Behavior: Behavior normal.         Last Recorded Vitals  Blood pressure 128/74, pulse 88, temperature 36.6 °C (97.9 °F), temperature source Temporal, resp. rate 18, height 1.828 m (5' 11.97\"), weight 80.6 kg (177 lb 12.8 oz), SpO2 92 %.  Intake/Output last 3 Shifts:  I/O last 3 completed shifts:  In: - (0 mL/kg)   Out: 350 (4.3 mL/kg) [Urine:350 (0.1 mL/kg/hr)]  Weight: 80.6 kg     Relevant Results      Scheduled medications  dexAMETHasone, 2 mg, oral, q12h VALERIA  dextromethorphan-guaifenesin, 1 tablet, oral, q12h  [Held by provider] enoxaparin, 40 mg, subcutaneous, q24h  insulin lispro, 0-5 Units, subcutaneous, TID with meals  ipratropium-albuteroL, 3 mL, nebulization, 4x daily  levETIRAcetam, 500 mg, oral, BID  melatonin, 5 mg, oral, Nightly  multivitamin with minerals, 1 tablet, oral, Daily  pantoprazole, 40 mg, oral, Daily before breakfast  rosuvastatin, 5 mg, oral, Daily  sennosides, 1 tablet, oral, BID      Continuous medications     PRN medications  PRN medications: albuterol, " benzocaine-menthol, benzonatate, dextrose 10 % in water (D10W), dextrose, gabapentin, glucagon, oxyCODONE, oxygen, polyethylene glycol    Results for orders placed or performed during the hospital encounter of 03/07/24 (from the past 24 hour(s))   CBC   Result Value Ref Range    WBC 10.8 4.4 - 11.3 x10*3/uL    nRBC 0.0 0.0 - 0.0 /100 WBCs    RBC 4.19 (L) 4.50 - 5.90 x10*6/uL    Hemoglobin 12.9 (L) 13.5 - 17.5 g/dL    Hematocrit 39.7 (L) 41.0 - 52.0 %    MCV 95 80 - 100 fL    MCH 30.8 26.0 - 34.0 pg    MCHC 32.5 32.0 - 36.0 g/dL    RDW 15.3 (H) 11.5 - 14.5 %    Platelets 286 150 - 450 x10*3/uL   Comprehensive metabolic panel   Result Value Ref Range    Glucose 97 74 - 99 mg/dL    Sodium 140 136 - 145 mmol/L    Potassium 3.3 (L) 3.5 - 5.3 mmol/L    Chloride 100 98 - 107 mmol/L    Bicarbonate 30 21 - 32 mmol/L    Anion Gap 13 10 - 20 mmol/L    Urea Nitrogen 19 6 - 23 mg/dL    Creatinine 0.63 0.50 - 1.30 mg/dL    eGFR >90 >60 mL/min/1.73m*2    Calcium 8.8 8.6 - 10.3 mg/dL    Albumin 3.2 (L) 3.4 - 5.0 g/dL    Alkaline Phosphatase 53 33 - 136 U/L    Total Protein 6.3 (L) 6.4 - 8.2 g/dL    AST 16 9 - 39 U/L    Bilirubin, Total 0.5 0.0 - 1.2 mg/dL    ALT 18 10 - 52 U/L   Magnesium   Result Value Ref Range    Magnesium 1.71 1.60 - 2.40 mg/dL   Phosphorus   Result Value Ref Range    Phosphorus 3.6 2.5 - 4.9 mg/dL   POCT GLUCOSE   Result Value Ref Range    POCT Glucose 92 74 - 99 mg/dL   POCT GLUCOSE   Result Value Ref Range    POCT Glucose 125 (H) 74 - 99 mg/dL   Glucose, CSF   Result Value Ref Range    Glucose, CSF 15 (L) 40 - 70 mg/dL   Protein, CSF   Result Value Ref Range    Total Protein,  (H) 15 - 45 mg/dL   CSF Cell Count   Result Value Ref Range    Tube Number, CSF Tube 1       Color, CSF Colorless Colorless    Clarity, CSF Hazy (A) Clear    Color, Supernatant CSF Colorless      WBC, CSF 81 (H) 1 - 5 /uL    RBC, CSF 4 0 - 5 /uL   CSF Differential   Result Value Ref Range    Neutrophils %, Manual, CSF 2 0 - 5 %     Lymphocytes %, Manual, CSF 35 28 - 96 %    Mono/Macrophages %, Manual, CSF 61 (H) 16 - 56 %    Eosinophils %, Manual, CSF 0 Rare %    Basophils %, Manual, CSF 0 Not Established %    Immature Granulocytes %, Manual, CSF 0 Not Established %    Blasts %, Manual, CSF 0 Not Established %    Unclassified Cells %, Manual, CSF 2 Not Established %    Plasma Cells %, Manual, CSF 0 Not Established %    Total Cells Counted,     POCT GLUCOSE   Result Value Ref Range    POCT Glucose 92 74 - 99 mg/dL   POCT GLUCOSE   Result Value Ref Range    POCT Glucose 122 (H) 74 - 99 mg/dL                  Assessment/Plan   Principal Problem:    Seizure (CMS/HCC)    63 yo man w h/o small cell carcinoma admitted w/ seizure.      Hypoxemia - likely related to his lung Ca since pna was ruled out.  Currently on 3L.  Wean O2 as tolerated.  Cont incentive spirometry.  Will need home O2 eval on discharge.    Will sign off at this time.  Please reconsult if there are any further questions.     Kane Delaney MD

## 2024-03-13 NOTE — PROGRESS NOTES
03/13/24 1110   Discharge Planning   Living Arrangements Spouse/significant other   Support Systems Spouse/significant other;Friends/neighbors;Family members   Assistance Needed Patient is A&Ox3, independent with ADL's and uses a WC and cane for ambulation at home, room air at baseline. Patients significant other states patient has recently become weaker and having trouble caring for himself independently.   Type of Residence Private residence   Number of Stairs to Enter Residence 0  (WC ramp)   Number of Stairs Within Residence 0   Who is requesting discharge planning? Provider   Home or Post Acute Services Community services   Type of Home Care Services Hospice   Patient expects to be discharged to: [facility]   Does the patient need discharge transport arranged? No

## 2024-03-13 NOTE — PROGRESS NOTES
Mike Franco is a 64 y.o. male on day 6 of admission presenting with Seizure (CMS/HCC).    Subjective   Advanced Care Planning Meeting:  Patient and his fiance met with [facility]  and signed consents this morning with goal to get patient home tomorrow with hospice services in place. Ellie plans to take leave of absence from work to care for patient. Author received a message from [facility] SW that patient and Ellie had some questions for author. Patient and Ellie seen at bedside, wanted to re-discuss patients code status and some other concerns. Patient again able to express his wishes that when his cancer progresses to terminal state, he does not want to by kept alive by any artifical life support such as a ventilator and feeding tube. Does not feel this would be an acceptable quality of life for himself and not something he is willing to go through for more time. Counseling again provided on CPR in the setting of patient's terminal metastatic lung cancer and goal to get home with focus on patient's comfort and alleviation of suffering for the time patient has left. Patient expressing understanding that survival of CPR in the setting of the severity of his illness would lead to a state of health he is not comfortable with and is not something he would go through for more time. Expressing wish to pass peacefully at home. Decision to leave code status DNR/DNI, Ellie in agreement with this. Ellie expressing concern about patient's seizure medications being continued when he goes home tomorrow. Author asking if they would feel more comfortable going to SNF with hospice in place and Ellie states that they cannot afford this. Counseling provided that [facility] will cover and supply patient's Keppra once patient is home. Ellie also worried that if patient had another seizure, she will not know what to do given she is not medical, and she will panic and call 911. Author sent message to Kirstin Villalta  "[facility] RN to see if there is a protocol in place for rescue medications incase patient has another seizure to abort seizure and avoid patient returning to the hospital by EMS which is not inline with current goals. After speaking to Kirstin, plan for MD Clark to order Nayzilam with beds to [facility] and patient to be sent home with this rescue medication. Ellie also notified that [facility] RN will meet with them prior to discharge and provide education on seizure precautions and [facility] rescue medication protocol (usually contains rectal or oral ativan but will use the Nayzilam if approved in this case) so she can feel prepared if patient has another seizure. Ellie in agreement with plan. Patient reports his last bowel movement was yesterday. Denied any other distressing concerns at this time. All questions answered during encounter and support and empathy provided in setting of new terminal diagnosis.        Objective     Physical Exam  Constitutional:       General: He is not in acute distress.     Appearance: He is ill-appearing.   HENT:      Head: Normocephalic and atraumatic.      Mouth/Throat:      Mouth: Mucous membranes are moist.   Eyes:      Conjunctiva/sclera: Conjunctivae normal.   Pulmonary:      Effort: Pulmonary effort is normal.   Skin:     General: Skin is warm and dry.   Neurological:      General: No focal deficit present.      Mental Status: He is alert and oriented to person, place, and time.   Psychiatric:         Thought Content: Thought content normal.       Last Recorded Vitals  Blood pressure 122/72, pulse 91, temperature 36.2 °C (97.2 °F), temperature source Temporal, resp. rate 16, height 1.828 m (5' 11.97\"), weight 77.1 kg (170 lb), SpO2 94 %.  Intake/Output last 3 Shifts:  I/O last 3 completed shifts:  In: - (0 mL/kg)   Out: 800 (10.4 mL/kg) [Urine:800 (0.3 mL/kg/hr)]  Weight: 77.1 kg     Relevant Results  Scheduled medications  dexAMETHasone, 2 mg, oral, q12h VALERIA  dextromethorphan-guaifenesin, 1 tablet, " oral, q12h  [Held by provider] enoxaparin, 40 mg, subcutaneous, q24h  insulin lispro, 0-5 Units, subcutaneous, TID with meals  ipratropium-albuteroL, 3 mL, nebulization, TID  levETIRAcetam, 500 mg, oral, BID  melatonin, 5 mg, oral, Nightly  multivitamin with minerals, 1 tablet, oral, Daily  pantoprazole, 40 mg, oral, Daily before breakfast  rosuvastatin, 5 mg, oral, Daily  sennosides, 1 tablet, oral, BID      PRN medications  PRN medications: acetaminophen, albuterol, benzocaine-menthol, benzonatate, dextrose 10 % in water (D10W), dextrose, gabapentin, glucagon, oxyCODONE, oxygen, polyethylene glycol     Results for orders placed or performed during the hospital encounter of 03/07/24 (from the past 24 hour(s))   Glucose, CSF   Result Value Ref Range    Glucose, CSF 15 (L) 40 - 70 mg/dL   Protein, CSF   Result Value Ref Range    Total Protein,  (H) 15 - 45 mg/dL   CSF Culture/Smear    Specimen: Lumbar Puncture; Cerebrospinal Fluid   Result Value Ref Range    Gram Stain (1+) Rare Polymorphonuclear leukocytes     Gram Stain No organisms seen    CSF Cell Count   Result Value Ref Range    Tube Number, CSF Tube 1       Color, CSF Colorless Colorless    Clarity, CSF Hazy (A) Clear    Color, Supernatant CSF Colorless      WBC, CSF 81 (H) 1 - 5 /uL    RBC, CSF 4 0 - 5 /uL   CSF Differential   Result Value Ref Range    Neutrophils %, Manual, CSF 2 0 - 5 %    Lymphocytes %, Manual, CSF 35 28 - 96 %    Mono/Macrophages %, Manual, CSF 61 (H) 16 - 56 %    Eosinophils %, Manual, CSF 0 Rare %    Basophils %, Manual, CSF 0 Not Established %    Immature Granulocytes %, Manual, CSF 0 Not Established %    Blasts %, Manual, CSF 0 Not Established %    Unclassified Cells %, Manual, CSF 2 Not Established %    Plasma Cells %, Manual, CSF 0 Not Established %    Total Cells Counted,     POCT GLUCOSE   Result Value Ref Range    POCT Glucose 92 74 - 99 mg/dL   POCT GLUCOSE   Result Value Ref Range    POCT Glucose 122 (H) 74 - 99  mg/dL   CBC   Result Value Ref Range    WBC 12.1 (H) 4.4 - 11.3 x10*3/uL    nRBC 0.0 0.0 - 0.0 /100 WBCs    RBC 3.99 (L) 4.50 - 5.90 x10*6/uL    Hemoglobin 12.5 (L) 13.5 - 17.5 g/dL    Hematocrit 37.5 (L) 41.0 - 52.0 %    MCV 94 80 - 100 fL    MCH 31.3 26.0 - 34.0 pg    MCHC 33.3 32.0 - 36.0 g/dL    RDW 15.1 (H) 11.5 - 14.5 %    Platelets 276 150 - 450 x10*3/uL   Comprehensive metabolic panel   Result Value Ref Range    Glucose 102 (H) 74 - 99 mg/dL    Sodium 138 136 - 145 mmol/L    Potassium 4.0 3.5 - 5.3 mmol/L    Chloride 101 98 - 107 mmol/L    Bicarbonate 26 21 - 32 mmol/L    Anion Gap 15 10 - 20 mmol/L    Urea Nitrogen 18 6 - 23 mg/dL    Creatinine 0.55 0.50 - 1.30 mg/dL    eGFR >90 >60 mL/min/1.73m*2    Calcium 8.9 8.6 - 10.3 mg/dL    Albumin 3.3 (L) 3.4 - 5.0 g/dL    Alkaline Phosphatase 54 33 - 136 U/L    Total Protein 6.0 (L) 6.4 - 8.2 g/dL    AST 15 9 - 39 U/L    Bilirubin, Total 0.4 0.0 - 1.2 mg/dL    ALT 17 10 - 52 U/L   Magnesium   Result Value Ref Range    Magnesium 1.69 1.60 - 2.40 mg/dL   Phosphorus   Result Value Ref Range    Phosphorus 3.7 2.5 - 4.9 mg/dL   POCT GLUCOSE   Result Value Ref Range    POCT Glucose 102 (H) 74 - 99 mg/dL   POCT GLUCOSE   Result Value Ref Range    POCT Glucose 96 74 - 99 mg/dL     Assessment/Plan   - Continue supportive care through primary team  - Oncology, Pulmonology, and Neurology consulted, appreciate recommendations     #Goals of Care:  #Complex Medical Decision Making:  #Advanced Care Planning:  - goals are comfort and quality of life based: patient and farrah Argueta signed consents to pursue hospice services through [facility] this morning     -- plan for patient to discharge home tomorrow with hospice services in place  - code status DNR/DNI   - state DNR form completed and placed in patient's chart, copy given to patient and Ellie   - copy of HPOA provided by farrah Argueta      Supportive Interventions: Continue Music Therapy      Signature and  billing  Medical complexity was high level due to complexity of problems including metastatic lung cancer with leptomeningeal carcinomatosis posing threat to life or function, extensive data review, interviewing patient/family, discussion with primary team and bedside RN, coordination of care, and high risk of management/treatment including patient's decision sign consents for hospice services through [facility].      Plan of Care discussed with: Updated MD Woods, MD Clark and bedside RN on advance care planning meeting and decision for patient to go home with [facility] services in place via epic secure chat and face-to-face encounter.      Thank you for asking Palliative Care to assist with care of this patient.  We will continue to follow  Please contact us for additional questions or concerns.     SIGNATURE: KATIE Wilburn  PAGER/CONTACT:  Contact information:  Palliative Medicine  Contact Via Epic Secure chat

## 2024-03-13 NOTE — CONSULTS
[facility] initial visit    [facility] SW met with pt and his fiancee Ellie Hamilton (also confirmed to be DPOAHC, copy obtained and placed in hospital chart).      Reviewed [facility] care and services.  Consents signed for services.     Discussed discharge options.    Reviewed [facility] as a possible option, though Ellie is very against this due to location and pt supports her decision.  Plan at this time is to go home.  Ellie reports she is still working, but will take leave to care for pt.      Hospice will order DME needed once discharge date is determined.      Discussed code status, reviewed code status of DNR/DNI as [facility].  Pt stated he has been thinking about it overnight and now is not sure.  Ellie also has questions about this and they would like to speak with Crys Mariee NP re: code status again.  Made Crys aware via Epic chat and was informed that she will review with them today.      Pt rated pain between 5-7/10 in chest area and states acceptable pain rating of 3-5/10, pt resting comfortably after receiving medications.      [facility] will follow daily and assist with discharge home when ready.      Updated Crys Mariee NP, Rebecca Velasquez NP, Kelle GOLD, José Miguel Gutierrez MD via [facility] chat.  Updated Estela RIOS RN in person.      Thank you.  Maddy Rosenberg, LAURENT  [phone]/[facility] Chat

## 2024-03-13 NOTE — PROGRESS NOTES
"Physical Therapy                 Therapy Communication Note    Patient Name: Mike Franco  MRN: [id number]  Today's Date: 3/13/2024     Discipline: Physical Therapy    Missed Visit Reason: Missed Visit Reason: Cancel (per VELASQUEZ Esquivel \"patietn and family have agreed to sign on with hospice, we are just waiting for the order. ok to discontinue therapy\" .no Tx. will discontinue therapy.)    Missed Time: [id number]    Comment:  "

## 2024-03-14 ENCOUNTER — PHARMACY VISIT (OUTPATIENT)
Dept: PHARMACY | Facility: CLINIC | Age: 64
End: 2024-03-14
Payer: MEDICARE

## 2024-03-14 VITALS
SYSTOLIC BLOOD PRESSURE: 112 MMHG | DIASTOLIC BLOOD PRESSURE: 61 MMHG | BODY MASS INDEX: 23.2 KG/M2 | OXYGEN SATURATION: 94 % | WEIGHT: 171.3 LBS | HEIGHT: 72 IN | HEART RATE: 70 BPM | TEMPERATURE: 97.7 F | RESPIRATION RATE: 15 BRPM

## 2024-03-14 LAB
ALBUMIN SERPL BCP-MCNC: 3.2 G/DL (ref 3.4–5)
ALP SERPL-CCNC: 52 U/L (ref 33–136)
ALT SERPL W P-5'-P-CCNC: 17 U/L (ref 10–52)
ANION GAP SERPL CALC-SCNC: 14 MMOL/L (ref 10–20)
AST SERPL W P-5'-P-CCNC: 13 U/L (ref 9–39)
BILIRUB SERPL-MCNC: 0.4 MG/DL (ref 0–1.2)
BUN SERPL-MCNC: 21 MG/DL (ref 6–23)
CALCIUM SERPL-MCNC: 8.4 MG/DL (ref 8.6–10.3)
CELL POPULATIONS: NORMAL
CHLORIDE SERPL-SCNC: 100 MMOL/L (ref 98–107)
CO2 SERPL-SCNC: 26 MMOL/L (ref 21–32)
CREAT SERPL-MCNC: 0.67 MG/DL (ref 0.5–1.3)
DIAGNOSIS: NORMAL
EGFRCR SERPLBLD CKD-EPI 2021: >90 ML/MIN/1.73M*2
ERYTHROCYTE [DISTWIDTH] IN BLOOD BY AUTOMATED COUNT: 15.2 % (ref 11.5–14.5)
FLOW DIFFERENTIAL: NORMAL
FLOW TEST ORDERED: NORMAL
FLUID CELL COUNT: 81 /UL
GLUCOSE BLD MANUAL STRIP-MCNC: 112 MG/DL (ref 74–99)
GLUCOSE BLD MANUAL STRIP-MCNC: 118 MG/DL (ref 74–99)
GLUCOSE SERPL-MCNC: 104 MG/DL (ref 74–99)
HCT VFR BLD AUTO: 38.4 % (ref 41–52)
HGB BLD-MCNC: 12.6 G/DL (ref 13.5–17.5)
LAB TEST METHOD: NORMAL
MAGNESIUM SERPL-MCNC: 1.66 MG/DL (ref 1.6–2.4)
MCH RBC QN AUTO: 31.1 PG (ref 26–34)
MCHC RBC AUTO-ENTMCNC: 32.8 G/DL (ref 32–36)
MCV RBC AUTO: 95 FL (ref 80–100)
NRBC BLD-RTO: 0 /100 WBCS (ref 0–0)
NUMBER OF CELLS COLLECTED: NORMAL
PATH REPORT.TOTAL CANCER: NORMAL
PLATELET # BLD AUTO: 314 X10*3/UL (ref 150–450)
POTASSIUM SERPL-SCNC: 3.6 MMOL/L (ref 3.5–5.3)
PROT SERPL-MCNC: 5.7 G/DL (ref 6.4–8.2)
RBC # BLD AUTO: 4.05 X10*6/UL (ref 4.5–5.9)
SIGNATURE COMMENT: NORMAL
SODIUM SERPL-SCNC: 136 MMOL/L (ref 136–145)
SPECIMEN VIABILITY: NORMAL
WBC # BLD AUTO: 16.5 X10*3/UL (ref 4.4–11.3)

## 2024-03-14 PROCEDURE — 99239 HOSP IP/OBS DSCHRG MGMT >30: CPT | Performed by: STUDENT IN AN ORGANIZED HEALTH CARE EDUCATION/TRAINING PROGRAM

## 2024-03-14 PROCEDURE — RXMED WILLOW AMBULATORY MEDICATION CHARGE

## 2024-03-14 PROCEDURE — 2500000001 HC RX 250 WO HCPCS SELF ADMINISTERED DRUGS (ALT 637 FOR MEDICARE OP)

## 2024-03-14 PROCEDURE — 2500000005 HC RX 250 GENERAL PHARMACY W/O HCPCS

## 2024-03-14 PROCEDURE — 36415 COLL VENOUS BLD VENIPUNCTURE: CPT

## 2024-03-14 PROCEDURE — 80053 COMPREHEN METABOLIC PANEL: CPT

## 2024-03-14 PROCEDURE — 85027 COMPLETE CBC AUTOMATED: CPT

## 2024-03-14 PROCEDURE — 94760 N-INVAS EAR/PLS OXIMETRY 1: CPT

## 2024-03-14 PROCEDURE — 2500000004 HC RX 250 GENERAL PHARMACY W/ HCPCS (ALT 636 FOR OP/ED)

## 2024-03-14 PROCEDURE — 2500000002 HC RX 250 W HCPCS SELF ADMINISTERED DRUGS (ALT 637 FOR MEDICARE OP, ALT 636 FOR OP/ED)

## 2024-03-14 PROCEDURE — 83735 ASSAY OF MAGNESIUM: CPT

## 2024-03-14 PROCEDURE — 82947 ASSAY GLUCOSE BLOOD QUANT: CPT

## 2024-03-14 RX ORDER — LEVETIRACETAM 500 MG/1
500 TABLET ORAL 2 TIMES DAILY
Qty: 60 TABLET | Refills: 0 | Status: SHIPPED | OUTPATIENT
Start: 2024-03-14

## 2024-03-14 RX ORDER — ACETAMINOPHEN 500 MG
5 TABLET ORAL NIGHTLY
Qty: 30 TABLET | Refills: 0 | Status: SHIPPED | OUTPATIENT
Start: 2024-03-14

## 2024-03-14 RX ORDER — PANTOPRAZOLE SODIUM 40 MG/1
40 TABLET, DELAYED RELEASE ORAL
Qty: 30 TABLET | Refills: 0 | Status: SHIPPED | OUTPATIENT
Start: 2024-03-15

## 2024-03-14 RX ORDER — ALBUTEROL SULFATE 0.83 MG/ML
2.5 SOLUTION RESPIRATORY (INHALATION) EVERY 2 HOUR PRN
Qty: 90 ML | Refills: 11 | Status: SHIPPED | OUTPATIENT
Start: 2024-03-14

## 2024-03-14 RX ORDER — OXYCODONE HYDROCHLORIDE 5 MG/1
5 TABLET ORAL EVERY 6 HOURS PRN
Qty: 15 TABLET | Refills: 0 | Status: SHIPPED | OUTPATIENT
Start: 2024-03-14 | End: 2024-03-18

## 2024-03-14 RX ORDER — DIAZEPAM 10 MG/1
TABLET ORAL
Qty: 6 TABLET | Refills: 0 | OUTPATIENT
Start: 2024-03-14

## 2024-03-14 RX ORDER — SENNOSIDES 8.6 MG/1
1 TABLET ORAL 2 TIMES DAILY
Qty: 60 TABLET | Refills: 0 | Status: SHIPPED | OUTPATIENT
Start: 2024-03-14

## 2024-03-14 RX ADMIN — OXYCODONE HYDROCHLORIDE 5 MG: 5 TABLET ORAL at 10:37

## 2024-03-14 RX ADMIN — ROSUVASTATIN CALCIUM 5 MG: 10 TABLET, FILM COATED ORAL at 08:20

## 2024-03-14 RX ADMIN — PANTOPRAZOLE SODIUM 40 MG: 40 TABLET, DELAYED RELEASE ORAL at 08:20

## 2024-03-14 RX ADMIN — Medication 3 L/MIN: at 08:36

## 2024-03-14 RX ADMIN — SENNOSIDES 8.6 MG: 8.6 TABLET, FILM COATED ORAL at 08:20

## 2024-03-14 RX ADMIN — LEVETIRACETAM 500 MG: 500 TABLET, FILM COATED ORAL at 08:20

## 2024-03-14 RX ADMIN — Medication 1 TABLET: at 08:20

## 2024-03-14 RX ADMIN — DEXAMETHASONE 2 MG: 2 TABLET ORAL at 08:21

## 2024-03-14 RX ADMIN — GUAIFENESIN, DEXTROMETHORPHAN HBR 1 TABLET: 600; 30 TABLET ORAL at 08:21

## 2024-03-14 ASSESSMENT — PAIN SCALES - GENERAL: PAINLEVEL_OUTOF10: 6

## 2024-03-14 NOTE — PROGRESS NOTES
03/14/24 1307   Discharge Planning   Living Arrangements Spouse/significant other   Support Systems Spouse/significant other;Friends/neighbors;Family members   Assistance Needed Patient is A&Ox3, independent with ADL's and uses a WC and cane for ambulation at home, room air at baseline. Patients significant other states patient has recently become weaker and having trouble caring for himself independently.   Type of Residence Private residence   Number of Stairs to Enter Residence 0  (WC ramp)   Number of Stairs Within Residence 0   Who is requesting discharge planning? Provider   Home or Post Acute Services Community services   Type of Home Care Services Hospice   Patient expects to be discharged to: Home with [facility]   Does the patient need discharge transport arranged? No

## 2024-03-14 NOTE — DISCHARGE SUMMARY
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       melatonin 5 mg tablet      Take 1 tablet (5 mg) by mouth once daily at bedtime.       nasal spray Nayzilam 5 mg/spray (0.1 mL) spray,non-aerosol  Generic drug: midazolam      Administer 1.542 sprays into affected nostril(s) 1 time for 1 dose.       sennosides 8.6 mg tablet  Commonly known as: Senokot      Take 1 tablet (8.6 mg) by mouth 2 times a day.              CHANGE how you take these medications        Instructions Last Dose Given Next Dose Due   pantoprazole 40 mg EC tablet  Commonly known as: ProtoNix  Start taking on: March 15, 2024  What changed:   how much to take  how to take this  when to take this  additional instructions      Take 1 tablet (40 mg) by mouth once daily in the morning. Take before meals. Do not crush, chew, or split. Do not start before March 15, 2024.              CONTINUE taking these medications        Instructions Last Dose Given Next Dose Due   albuterol 2.5 mg /3 mL (0.083 %) nebulizer solution      Take 3 mL by nebulization every 6 hours if needed (For respiratory rate GREATER THAN OR EQUAL TO 28 breaths/minute and/or wheezing.)       albuterol 90 mcg/actuation inhaler      Inhale 2 puffs every 6 hours if needed for wheezing.       dextromethorphan-guaifenesin  mg 12 hr tablet  Commonly known as: Mucinex DM           gabapentin 100 mg capsule  Commonly known as: Neurontin           multivitamin tablet           oxyCODONE 5 mg immediate release tablet  Commonly known as: Roxicodone      Take 1 tablet (5 mg) by mouth every 6 hours if needed for moderate pain (4 - 6) for up to 4 days.       rosuvastatin 5 mg tablet  Commonly  known as: Crestor                  STOP taking these medications      ALPRAZolam 0.25 mg tablet  Commonly known as: Xanax        doxycycline 100 mg tablet  Commonly known as: Adoxa        EPINEPHrine 0.3 mg/0.3 mL injection syringe  Commonly known as: Epipen        OLANZapine 5 mg tablet  Commonly known as: ZyPREXA        ondansetron 8 mg tablet  Commonly known as: Zofran        prochlorperazine 10 mg tablet  Commonly known as: Compazine                  Where to Get Your Medications        These medications were sent to [facility]  [address], [place] [address]      Hours: 9 AM to 5 PM Mon-Fri Phone: [phone]   levETIRAcetam 500 mg tablet  melatonin 5 mg tablet  nasal spray Nayzilam 5 mg/spray (0.1 mL) spray,non-aerosol  pantoprazole 40 mg EC tablet  sennosides 8.6 mg tablet       You can get these medications from any pharmacy    Bring a paper prescription for each of these medications  oxyCODONE 5 mg immediate release tablet         Test Results Pending At Discharge  Pending Labs       Order Current Status    Encephalopathy-Autoimmune Evaluation,CSF In process    Encephalopathy-Autoimmune Evaluation,Serum In process    Non-gynecologic cytology In process    CSF Culture/Smear Preliminary result            Hospital Course  Mike Franco is a 64 y.o. 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   Pt presented to the ED 3/4 for cough and SOB that has lasted one month, and imaging concerning for worsening lung cancer with questionable areas of pneumonitis. Pt was stable and was discharged home with doxycycline for CAP with urgent outpt follow up with PCP and oncologist.   Pt presented to ED again 3/7  with altered mental status and seizure. Per family, pt has been feeling sick with fatigue and a cough, and feeling too weak to get out of bed. Wife went to bedroom and found the pt extremely lethargic and unarousable, and called EMS. Before the squad arrived to the home, the pt's entire body started shaking. He has never had seizures before.  ED course:    Pt with new-onset seizure and was postictal during his stay. He was intubated to protect his airway. He was also tachycardic and in new-onset a-fib with RVR with rate over 200, pt was cardioverted x3 at 360 J without success, and started on an amiodarone drip.  Glucose 188, Na 139, K 3.9, bicarb 14, lactate 15.6, INR 1.3, WBC 26.3, Hgb 14.5, platelet 494. UA with moderate blood, 1+ bacteria, no LE or nitrites.   Imaging: CXR: s/p intubation, left perihilar and basilar atelectasis or infiltrate. CTA chest: no PE; large lobular mass in left hilum extending in to upper lobe invading mediastinum, associated mediastinal adenopathy, and left lower neck adenopathy; Occlusion of proximal left upper lobe mainstem bronchus and narrowing of proximal segmental arteries to left upper lobe; bibasilar atelectasis. CT head: no acute intracranial finding.   Pt was given vancomycin and zosyn in the ED to cover for CAP.   Last VBG had pH 7.25, pCO2 55, pO2 38, respiratory rate increased from 22 to 28 and pt was transferred to the ICU.      ICU Arrival: Pt arrived on ventilator with propofol, fentanyl, and on amiodarone drip. Pt was in normal sinus rhythm on arrival. OG tube placed, blood cultures sent from ICU. Hypotensive 89/64, given 1L NS bolus. Keppra loading dose and ordered maintenance dose.   3/8: Pt extubated, horne removed, able to pass urine. 3L O2 NC. Neurology consulted. EEG and MRI of brain with and without IV contrast ordered. Vancomycin stopped, MRSA negative. PT/OT consulted  3/9: Considering procalcitonin level, infectious pneumonia less likely, stopping  ceftriaxone. Will complete three-day course of azithromycin in the setting of likely undiagnosed COPD.  Pt on 2L oxygen (baseline RA). 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 Plan for lumbar puncture for confirmatory testing. Per pt and family request, contacted their current oncologist, Dr. Sandhu, that he is currently in the hospital.     Floor Course: Patient medically stable and transferred to floor. Transiently treated with dexamethasone 2mg BID to assist in symptomatic relief. IR consulted for LP which showed high protein of 213 and low glucose of 15, consistent with bacterial. Patient's Oncologist called and spoke with his fiance, recommended considering hospice due to suspected brain mets and MRI results. Hospice consulted, patient discharged home with hospice on 3/14/24. Sent with scripts for Keppra, Oxycodone, Protonix, Melatonin, Duonebs, Senokot, and Midazolam PRN for seizure .     Pertinent Physical Exam At Time of Discharge  Constitutional: Appears stated age. In NAD.   CV: RRR, No M/R/G  PULM: CTAB, no coughing or wheezing  ABDOMEN: Soft, NT/ND. No TTP. + BSx4.  SKIN: Normal Color, Warm, Dry, No Rashes   EXTREMITIES: R AKA, non-tender, no LE edema   NEURO: A&O x 3, nonfocal neurological exam.  PSYCH: Normal Mood & Behavior    Outpatient Follow-Up  No future appointments.      Isha Clark DO

## 2024-03-14 NOTE — CARE PLAN
Problem: Skin  Goal: Decreased wound size/increased tissue granulation at next dressing change  3/13/2024 2136 by Nikolas Estrada RN  Outcome: Progressing  Flowsheets (Taken 3/12/2024 1853 by Deja Cloud RN)  Decreased wound size/increased tissue granulation at next dressing change: Promote sleep for wound healing  3/13/2024 2135 by Nikolas Estrada RN  Outcome: Progressing  Flowsheets (Taken 3/12/2024 1853 by Deja Cloud RN)  Decreased wound size/increased tissue granulation at next dressing change: Promote sleep for wound healing  3/13/2024 2135 by Nikolas Estrada RN  Outcome: Progressing  Goal: Participates in plan/prevention/treatment measures  3/13/2024 2136 by Nikolas Estrada RN  Outcome: Progressing  3/13/2024 2135 by Nikolas Estrada RN  Outcome: Progressing  3/13/2024 2135 by Nikolas Estrada RN  Outcome: Progressing  Goal: Prevent/manage excess moisture  3/13/2024 2136 by Nikolas Estrada RN  Outcome: Progressing  3/13/2024 2135 by Nikolas Estrada RN  Outcome: Progressing  3/13/2024 2135 by Nikolas Estrada RN  Outcome: Progressing  Goal: Prevent/minimize sheer/friction injuries  3/13/2024 2136 by Nikolas Estrada RN  Outcome: Progressing  3/13/2024 2135 by Nikolas Estrada RN  Outcome: Progressing  3/13/2024 2135 by Nikolas Estrada RN  Outcome: Progressing  Goal: Promote/optimize nutrition  3/13/2024 2136 by Nikolas Estrada RN  Outcome: Progressing  3/13/2024 2135 by Nikolas Estrada RN  Outcome: Progressing  3/13/2024 2135 by Nikolas Estrada RN  Outcome: Progressing  Goal: Promote skin healing  3/13/2024 2136 by Nikolas Estrada RN  Outcome: Progressing  3/13/2024 2135 by Nikolas Estrada RN  Outcome: Progressing  3/13/2024 2135 by Nikolas Estrada RN  Outcome: Progressing   The patient's goals for the shift include jessie    The clinical goals for the shift include Patients pain to be well controlled throughout shift    Over the shift, the patient did not make progress toward the following goals. Barriers to progression include   Problem:  Skin  Goal: Decreased wound size/increased tissue granulation at next dressing change  3/13/2024 2136 by Nikolas Estrada RN  Outcome: Progressing  Flowsheets (Taken 3/12/2024 1853 by Deja Cloud RN)  Decreased wound size/increased tissue granulation at next dressing change: Promote sleep for wound healing  3/13/2024 2135 by Nikolas Estrada RN  Outcome: Progressing  Flowsheets (Taken 3/12/2024 1853 by Deja Cloud RN)  Decreased wound size/increased tissue granulation at next dressing change: Promote sleep for wound healing  3/13/2024 2135 by Nikolas Estrada RN  Outcome: Progressing   . Recommendations to address these barriers include .

## 2024-03-14 NOTE — CARE PLAN
The patient's goals for the shift include jessie    The clinical goals for the shift include Patients pain to be well controlled throughout shift    Over the shift, the patient did not make progress toward the following goals. Barriers to progression include . Recommendations to address these barriers include   Problem: Pain  Goal: My pain/discomfort is manageable  Outcome: Progressing  Flowsheets (Taken 3/13/2024 2135)  Resident's pain/discomfort is manageable: Include resident/family/caregiver in decisions related to pain management   .

## 2024-03-15 LAB
AMPAR2 IGG SERPL QL CBA IFA: NEGATIVE
AMPHIPHYSIN IGG SER QL IA: NEGATIVE
ANNOTATION COMMENT IMP: NORMAL
CASPR2 IGG SER QL CBA IFA: NEGATIVE
CV2 AB SERPL QL IF: NEGATIVE
DPPX IGG SERPL QL IF: NEGATIVE
GABABR IGG SERPL QL CBA IFA: NEGATIVE
GAD65 AB SER-SCNC: 0 NMOL/L
GFAP ALPHA IGG SER QL IF: NEGATIVE
GLIAL NUC TYPE 1 AB SER QL IF: NEGATIVE
HU1 AB SER QL: NEGATIVE
HU2 AB SER QL IF: NEGATIVE
HU3 AB SER QL: NEGATIVE
IGLON5 IGG SER QL IF: NEGATIVE
IMMUNOLOGIST REVIEW: NORMAL
LGI1 IGG SER QL CBA IFA: NEGATIVE
MGLUR1 IGG SER QL IF: NEGATIVE
NEUROCHONDRIN IFA, S: NEGATIVE
NIF IGG SER QL IF: NEGATIVE
NMDAR1 IGG SER QL CBA IFA: NEGATIVE
PCA-1 AB SER QL IF: NEGATIVE
PCA-2 AB SER QL IF: NEGATIVE
PCA-TR AB SER QL IF: NEGATIVE
SEPTIN-7 IFA, S: NEGATIVE

## 2024-03-18 ENCOUNTER — APPOINTMENT (OUTPATIENT)
Dept: RADIATION ONCOLOGY | Facility: HOSPITAL | Age: 64
End: 2024-03-18
Payer: COMMERCIAL

## 2024-03-19 LAB
AMPAR2 IGG CSF QL CBA IFA: NEGATIVE
AMPHIPHYSIN AB CSF QL IF: NEGATIVE
ANNOTATION COMMENT IMP: NORMAL
CASPR2 IGG CSF QL CBA IFA: NEGATIVE
CV2 AB CSF QL IF: NEGATIVE
DPPX IGG CSF QL IF: NEGATIVE
GABABR IGG CSF QL CBA IFA: NEGATIVE
GAD65 IGG+IGM CSF IA-SCNC: 0 NMOL/L
GFAP ALPHA IGG CSF QL IF: NEGATIVE
GLIAL NUC TYPE 1 AB CSF QL IF: NEGATIVE
HU1 AB CSF QL IF: NEGATIVE
HU2 AB CSF QL IF: NEGATIVE
HU3 AB CSF QL IF: NEGATIVE
IGLON5 IGG CSF QL IF: NEGATIVE
IMMUNOLOGIST REVIEW: NORMAL
LGI1 IGG CSF QL CBA IFA: NEGATIVE
MGLUR1 IGG CSF QL IF: NEGATIVE
NEUROCHONDRIN IFA, CSF: NEGATIVE
NIF IGG CSF QL IF: NEGATIVE
NMDAR1 IGG CSF QL CBA IFA: NEGATIVE
PCA-1 AB CSF QL IF: NEGATIVE
PCA-2 AB CSF QL IF: NEGATIVE
PCA-TR AB CSF QL IF: NEGATIVE
SEPTIN-7 IFA, CSF: NEGATIVE

## 2024-03-20 ENCOUNTER — APPOINTMENT (OUTPATIENT)
Dept: HEMATOLOGY/ONCOLOGY | Facility: CLINIC | Age: 64
End: 2024-03-20
Payer: COMMERCIAL

## 2024-03-20 LAB
BACTERIA CSF CULT: NORMAL
GRAM STN SPEC: NORMAL
GRAM STN SPEC: NORMAL

## 2024-03-21 ENCOUNTER — APPOINTMENT (OUTPATIENT)
Dept: HEMATOLOGY/ONCOLOGY | Facility: CLINIC | Age: 64
End: 2024-03-21
Payer: COMMERCIAL

## 2024-03-22 ENCOUNTER — APPOINTMENT (OUTPATIENT)
Dept: HEMATOLOGY/ONCOLOGY | Facility: CLINIC | Age: 64
End: 2024-03-22
Payer: COMMERCIAL

## 2024-03-26 ENCOUNTER — APPOINTMENT (OUTPATIENT)
Dept: HEMATOLOGY/ONCOLOGY | Facility: HOSPITAL | Age: 64
End: 2024-03-26
Payer: COMMERCIAL

## 2024-03-27 ENCOUNTER — APPOINTMENT (OUTPATIENT)
Dept: HEMATOLOGY/ONCOLOGY | Facility: CLINIC | Age: 64
End: 2024-03-27
Payer: COMMERCIAL

## 2024-03-28 ENCOUNTER — APPOINTMENT (OUTPATIENT)
Dept: HEMATOLOGY/ONCOLOGY | Facility: CLINIC | Age: 64
End: 2024-03-28
Payer: COMMERCIAL

## 2024-03-29 ENCOUNTER — APPOINTMENT (OUTPATIENT)
Dept: HEMATOLOGY/ONCOLOGY | Facility: CLINIC | Age: 64
End: 2024-03-29
Payer: COMMERCIAL

## 2024-05-13 ENCOUNTER — APPOINTMENT (OUTPATIENT)
Dept: RADIOLOGY | Facility: HOSPITAL | Age: 64
End: 2024-05-13
Payer: COMMERCIAL